# Patient Record
Sex: FEMALE | Race: WHITE | Employment: PART TIME | URBAN - METROPOLITAN AREA
[De-identification: names, ages, dates, MRNs, and addresses within clinical notes are randomized per-mention and may not be internally consistent; named-entity substitution may affect disease eponyms.]

---

## 2017-01-25 ENCOUNTER — TELEPHONE (OUTPATIENT)
Dept: INTERNAL MEDICINE CLINIC | Age: 57
End: 2017-01-25

## 2017-01-27 ENCOUNTER — OFFICE VISIT (OUTPATIENT)
Dept: INTERNAL MEDICINE CLINIC | Age: 57
End: 2017-01-27

## 2017-01-27 VITALS
WEIGHT: 163 LBS | SYSTOLIC BLOOD PRESSURE: 116 MMHG | DIASTOLIC BLOOD PRESSURE: 84 MMHG | RESPIRATION RATE: 16 BRPM | HEIGHT: 63 IN | HEART RATE: 93 BPM | BODY MASS INDEX: 28.88 KG/M2 | OXYGEN SATURATION: 98 %

## 2017-01-27 DIAGNOSIS — K52.9 CHRONIC DIARRHEA: Primary | ICD-10-CM

## 2017-01-27 PROCEDURE — 99212 OFFICE O/P EST SF 10 MIN: CPT | Performed by: INTERNAL MEDICINE

## 2017-04-10 ENCOUNTER — OFFICE VISIT (OUTPATIENT)
Dept: INTERNAL MEDICINE CLINIC | Age: 57
End: 2017-04-10

## 2017-04-10 VITALS — TEMPERATURE: 98.1 F | HEIGHT: 63 IN | RESPIRATION RATE: 16 BRPM | WEIGHT: 163 LBS | BODY MASS INDEX: 28.88 KG/M2

## 2017-04-10 DIAGNOSIS — J01.00 ACUTE MAXILLARY SINUSITIS, RECURRENCE NOT SPECIFIED: Primary | ICD-10-CM

## 2017-04-10 PROCEDURE — 99213 OFFICE O/P EST LOW 20 MIN: CPT | Performed by: INTERNAL MEDICINE

## 2017-04-10 RX ORDER — AMOXICILLIN AND CLAVULANATE POTASSIUM 875; 125 MG/1; MG/1
1 TABLET, FILM COATED ORAL 2 TIMES DAILY
Qty: 20 TABLET | Refills: 0 | Status: SHIPPED | OUTPATIENT
Start: 2017-04-10 | End: 2017-04-20

## 2017-04-10 RX ORDER — GUAIFENESIN AND PSEUDOEPHEDRINE HCL 1200; 120 MG/1; MG/1
TABLET, EXTENDED RELEASE ORAL
Qty: 20 TABLET | Refills: 0 | Status: SHIPPED | OUTPATIENT
Start: 2017-04-10 | End: 2017-12-13 | Stop reason: ALTCHOICE

## 2017-04-21 ENCOUNTER — OFFICE VISIT (OUTPATIENT)
Dept: INTERNAL MEDICINE CLINIC | Age: 57
End: 2017-04-21

## 2017-04-21 VITALS
SYSTOLIC BLOOD PRESSURE: 118 MMHG | HEIGHT: 63 IN | RESPIRATION RATE: 16 BRPM | DIASTOLIC BLOOD PRESSURE: 80 MMHG | WEIGHT: 163 LBS | BODY MASS INDEX: 28.88 KG/M2

## 2017-04-21 DIAGNOSIS — E11.9 TYPE 2 DIABETES MELLITUS WITHOUT COMPLICATION, WITHOUT LONG-TERM CURRENT USE OF INSULIN (HCC): Primary | Chronic | ICD-10-CM

## 2017-04-21 DIAGNOSIS — I10 ESSENTIAL HYPERTENSION: Chronic | ICD-10-CM

## 2017-04-21 DIAGNOSIS — I48.91 ATRIAL FIBRILLATION, CONTROLLED (HCC): Chronic | ICD-10-CM

## 2017-04-21 DIAGNOSIS — E78.2 MIXED HYPERLIPIDEMIA: Chronic | ICD-10-CM

## 2017-04-21 LAB
A/G RATIO: 2.1 (ref 1.1–2.2)
ALBUMIN SERPL-MCNC: 4.6 G/DL (ref 3.4–5)
ALP BLD-CCNC: 64 U/L (ref 40–129)
ALT SERPL-CCNC: 36 U/L (ref 10–40)
ANION GAP SERPL CALCULATED.3IONS-SCNC: 17 MMOL/L (ref 3–16)
AST SERPL-CCNC: 14 U/L (ref 15–37)
BASOPHILS ABSOLUTE: 0.1 K/UL (ref 0–0.2)
BASOPHILS RELATIVE PERCENT: 0.8 %
BILIRUB SERPL-MCNC: 0.8 MG/DL (ref 0–1)
BUN BLDV-MCNC: 17 MG/DL (ref 7–20)
CALCIUM SERPL-MCNC: 9.9 MG/DL (ref 8.3–10.6)
CHLORIDE BLD-SCNC: 100 MMOL/L (ref 99–110)
CHOLESTEROL, TOTAL: 208 MG/DL (ref 0–199)
CO2: 27 MMOL/L (ref 21–32)
CREAT SERPL-MCNC: 0.6 MG/DL (ref 0.6–1.1)
CREATININE URINE: 217.6 MG/DL (ref 28–259)
EOSINOPHILS ABSOLUTE: 0.1 K/UL (ref 0–0.6)
EOSINOPHILS RELATIVE PERCENT: 1.1 %
GFR AFRICAN AMERICAN: >60
GFR NON-AFRICAN AMERICAN: >60
GLOBULIN: 2.2 G/DL
GLUCOSE BLD-MCNC: 123 MG/DL (ref 70–99)
HBA1C MFR BLD: 6.8 %
HCT VFR BLD CALC: 40.2 % (ref 36–48)
HDLC SERPL-MCNC: 70 MG/DL (ref 40–60)
HEMOGLOBIN: 13.2 G/DL (ref 12–16)
LDL CHOLESTEROL CALCULATED: 111 MG/DL
LYMPHOCYTES ABSOLUTE: 3 K/UL (ref 1–5.1)
LYMPHOCYTES RELATIVE PERCENT: 34.8 %
MCH RBC QN AUTO: 33.3 PG (ref 26–34)
MCHC RBC AUTO-ENTMCNC: 32.9 G/DL (ref 31–36)
MCV RBC AUTO: 101.2 FL (ref 80–100)
MICROALBUMIN UR-MCNC: <1.2 MG/DL
MICROALBUMIN/CREAT UR-RTO: NORMAL MG/G (ref 0–30)
MONOCYTES ABSOLUTE: 0.6 K/UL (ref 0–1.3)
MONOCYTES RELATIVE PERCENT: 7.4 %
NEUTROPHILS ABSOLUTE: 4.8 K/UL (ref 1.7–7.7)
NEUTROPHILS RELATIVE PERCENT: 55.9 %
PDW BLD-RTO: 13.5 % (ref 12.4–15.4)
PLATELET # BLD: 260 K/UL (ref 135–450)
PMV BLD AUTO: 9.6 FL (ref 5–10.5)
POTASSIUM SERPL-SCNC: 4.8 MMOL/L (ref 3.5–5.1)
RBC # BLD: 3.98 M/UL (ref 4–5.2)
SODIUM BLD-SCNC: 144 MMOL/L (ref 136–145)
TOTAL PROTEIN: 6.8 G/DL (ref 6.4–8.2)
TRIGL SERPL-MCNC: 136 MG/DL (ref 0–150)
TSH SERPL DL<=0.05 MIU/L-ACNC: 0.6 UIU/ML (ref 0.27–4.2)
VLDLC SERPL CALC-MCNC: 27 MG/DL
WBC # BLD: 8.6 K/UL (ref 4–11)

## 2017-04-21 PROCEDURE — 36415 COLL VENOUS BLD VENIPUNCTURE: CPT | Performed by: INTERNAL MEDICINE

## 2017-04-21 PROCEDURE — 83036 HEMOGLOBIN GLYCOSYLATED A1C: CPT | Performed by: INTERNAL MEDICINE

## 2017-04-21 PROCEDURE — 99214 OFFICE O/P EST MOD 30 MIN: CPT | Performed by: INTERNAL MEDICINE

## 2017-04-21 RX ORDER — ATORVASTATIN CALCIUM 40 MG/1
TABLET, FILM COATED ORAL
Qty: 30 TABLET | Refills: 5 | Status: SHIPPED | OUTPATIENT
Start: 2017-04-21 | End: 2017-10-13 | Stop reason: SDUPTHER

## 2017-04-21 RX ORDER — METOPROLOL TARTRATE 50 MG/1
50 TABLET, FILM COATED ORAL 3 TIMES DAILY
Qty: 90 TABLET | Refills: 5 | Status: SHIPPED | OUTPATIENT
Start: 2017-04-21 | End: 2017-10-13 | Stop reason: SDUPTHER

## 2017-04-21 RX ORDER — GLYBURIDE-METFORMIN HYDROCHLORIDE 5; 500 MG/1; MG/1
TABLET ORAL
Qty: 60 TABLET | Refills: 5 | Status: SHIPPED | OUTPATIENT
Start: 2017-04-21 | End: 2017-10-13 | Stop reason: SDUPTHER

## 2017-04-21 RX ORDER — VALSARTAN AND HYDROCHLOROTHIAZIDE 160; 12.5 MG/1; MG/1
TABLET, FILM COATED ORAL
Qty: 30 TABLET | Refills: 5 | Status: SHIPPED | OUTPATIENT
Start: 2017-04-21 | End: 2017-10-13 | Stop reason: SDUPTHER

## 2017-07-17 ENCOUNTER — TELEPHONE (OUTPATIENT)
Dept: INTERNAL MEDICINE CLINIC | Age: 57
End: 2017-07-17

## 2017-10-13 ENCOUNTER — OFFICE VISIT (OUTPATIENT)
Dept: INTERNAL MEDICINE CLINIC | Age: 57
End: 2017-10-13

## 2017-10-13 VITALS
SYSTOLIC BLOOD PRESSURE: 122 MMHG | WEIGHT: 159 LBS | BODY MASS INDEX: 28.17 KG/M2 | RESPIRATION RATE: 16 BRPM | HEIGHT: 63 IN | DIASTOLIC BLOOD PRESSURE: 84 MMHG

## 2017-10-13 DIAGNOSIS — I48.91 ATRIAL FIBRILLATION, CONTROLLED (HCC): Chronic | ICD-10-CM

## 2017-10-13 DIAGNOSIS — E78.2 MIXED HYPERLIPIDEMIA: Chronic | ICD-10-CM

## 2017-10-13 DIAGNOSIS — E11.9 TYPE 2 DIABETES MELLITUS WITHOUT COMPLICATION, WITHOUT LONG-TERM CURRENT USE OF INSULIN (HCC): Chronic | ICD-10-CM

## 2017-10-13 DIAGNOSIS — Z00.00 EXAMINATION, MEDICAL, GENERAL: Primary | ICD-10-CM

## 2017-10-13 DIAGNOSIS — I10 ESSENTIAL HYPERTENSION: Chronic | ICD-10-CM

## 2017-10-13 LAB
A/G RATIO: 1.7 (ref 1.1–2.2)
ALBUMIN SERPL-MCNC: 4.2 G/DL (ref 3.4–5)
ALP BLD-CCNC: 60 U/L (ref 40–129)
ALT SERPL-CCNC: 101 U/L (ref 10–40)
ANION GAP SERPL CALCULATED.3IONS-SCNC: 13 MMOL/L (ref 3–16)
AST SERPL-CCNC: 42 U/L (ref 15–37)
BASOPHILS ABSOLUTE: 0.1 K/UL (ref 0–0.2)
BASOPHILS RELATIVE PERCENT: 0.7 %
BILIRUB SERPL-MCNC: 0.6 MG/DL (ref 0–1)
BUN BLDV-MCNC: 16 MG/DL (ref 7–20)
CALCIUM SERPL-MCNC: 9.9 MG/DL (ref 8.3–10.6)
CHLORIDE BLD-SCNC: 101 MMOL/L (ref 99–110)
CHOLESTEROL, TOTAL: 199 MG/DL (ref 0–199)
CO2: 26 MMOL/L (ref 21–32)
CREAT SERPL-MCNC: 0.6 MG/DL (ref 0.6–1.1)
EOSINOPHILS ABSOLUTE: 0.1 K/UL (ref 0–0.6)
EOSINOPHILS RELATIVE PERCENT: 1.4 %
GFR AFRICAN AMERICAN: >60
GFR NON-AFRICAN AMERICAN: >60
GLOBULIN: 2.5 G/DL
GLUCOSE BLD-MCNC: 91 MG/DL (ref 70–99)
HCT VFR BLD CALC: 39.2 % (ref 36–48)
HDLC SERPL-MCNC: 72 MG/DL (ref 40–60)
HEMOGLOBIN: 13.3 G/DL (ref 12–16)
LDL CHOLESTEROL CALCULATED: 105 MG/DL
LYMPHOCYTES ABSOLUTE: 3 K/UL (ref 1–5.1)
LYMPHOCYTES RELATIVE PERCENT: 41.3 %
MCH RBC QN AUTO: 34.4 PG (ref 26–34)
MCHC RBC AUTO-ENTMCNC: 33.8 G/DL (ref 31–36)
MCV RBC AUTO: 101.8 FL (ref 80–100)
MONOCYTES ABSOLUTE: 0.8 K/UL (ref 0–1.3)
MONOCYTES RELATIVE PERCENT: 11.5 %
NEUTROPHILS ABSOLUTE: 3.3 K/UL (ref 1.7–7.7)
NEUTROPHILS RELATIVE PERCENT: 45.1 %
PDW BLD-RTO: 12.8 % (ref 12.4–15.4)
PLATELET # BLD: 252 K/UL (ref 135–450)
PMV BLD AUTO: 9.3 FL (ref 5–10.5)
POTASSIUM SERPL-SCNC: 4.5 MMOL/L (ref 3.5–5.1)
RBC # BLD: 3.85 M/UL (ref 4–5.2)
SODIUM BLD-SCNC: 140 MMOL/L (ref 136–145)
TOTAL PROTEIN: 6.7 G/DL (ref 6.4–8.2)
TRIGL SERPL-MCNC: 108 MG/DL (ref 0–150)
TSH SERPL DL<=0.05 MIU/L-ACNC: 0.94 UIU/ML (ref 0.27–4.2)
VLDLC SERPL CALC-MCNC: 22 MG/DL
WBC # BLD: 7.3 K/UL (ref 4–11)

## 2017-10-13 PROCEDURE — 36415 COLL VENOUS BLD VENIPUNCTURE: CPT | Performed by: INTERNAL MEDICINE

## 2017-10-13 PROCEDURE — 99396 PREV VISIT EST AGE 40-64: CPT | Performed by: INTERNAL MEDICINE

## 2017-10-13 RX ORDER — METOPROLOL TARTRATE 50 MG/1
50 TABLET, FILM COATED ORAL 3 TIMES DAILY
Qty: 90 TABLET | Refills: 5 | Status: SHIPPED | OUTPATIENT
Start: 2017-10-13 | End: 2018-05-05 | Stop reason: SDUPTHER

## 2017-10-13 RX ORDER — ATORVASTATIN CALCIUM 40 MG/1
TABLET, FILM COATED ORAL
Qty: 90 TABLET | Refills: 3 | Status: SHIPPED | OUTPATIENT
Start: 2017-10-13 | End: 2018-05-11 | Stop reason: SDUPTHER

## 2017-10-13 RX ORDER — GLYBURIDE-METFORMIN HYDROCHLORIDE 5; 500 MG/1; MG/1
TABLET ORAL
Qty: 60 TABLET | Refills: 5 | Status: SHIPPED | OUTPATIENT
Start: 2017-10-13 | End: 2018-05-04 | Stop reason: SDUPTHER

## 2017-10-13 RX ORDER — VALSARTAN AND HYDROCHLOROTHIAZIDE 160; 12.5 MG/1; MG/1
TABLET, FILM COATED ORAL
Qty: 30 TABLET | Refills: 5 | Status: SHIPPED | OUTPATIENT
Start: 2017-10-13 | End: 2018-04-15 | Stop reason: SDUPTHER

## 2017-10-13 ASSESSMENT — PATIENT HEALTH QUESTIONNAIRE - PHQ9
2. FEELING DOWN, DEPRESSED OR HOPELESS: 0
SUM OF ALL RESPONSES TO PHQ9 QUESTIONS 1 & 2: 0
SUM OF ALL RESPONSES TO PHQ QUESTIONS 1-9: 0

## 2017-10-13 NOTE — ASSESSMENT & PLAN NOTE
This is a chronic problem. The problem is well controlled. Patient monitors readings regularly. Pertinent negatives include no chest pain, focal sensory loss, focal weakness, leg pain, myalgias or shortness of breath. No headaches or chest pain. Takes medications regularly. Blood pressure has been stable, blood work was reviewed, and advised patient to continue the current instructions or medications.

## 2017-10-13 NOTE — PROGRESS NOTES
Chief complaints:  Wilfredo Osei is a 62 y.o. female who presents to the office for a general physical examination. History of present illness:  Here for a physical and fasting blood work,  Type 2 diabetes mellitus without complication ([address]  This problem is stable, reviewed in detail, and advised patient to continue the current instructions or medications. Will continue to closely monitor the situation. Blood sugars have been ok , will monitor closely and cover with sliding scale insulin coverage and continue current medications. BP Readings from Last 2 Encounters:   10/13/17 122/84   04/21/17 118/80     Hemoglobin A1C (%)   Date Value   04/21/2017 6.8     MICROALBUMIN, RANDOM URINE (mg/dL)   Date Value   04/21/2017 <1.20     LDL Calculated (mg/dL)   Date Value   04/21/2017 111 (H)              Tobacco use:  Patient  reports that she has never smoked. She has never used smokeless tobacco.    If Smoker - Cessation materials given? NA    Is Daily aspirin on medication list?:  Yes    Diabetic retinal exam done? Yes   If yes, document in Health Maintenance. Monofilament placed on counter? Yes    Shoes and socks removed? Yes    BP taken with correct size cuff? Yes   Repeated if > 130/80 Yes     Microalbumin performed if applicable? Yes      Essential hypertension  This is a chronic problem. The problem is well controlled. Patient monitors readings regularly. Pertinent negatives include no chest pain, focal sensory loss, focal weakness, leg pain, myalgias or shortness of breath. No headaches or chest pain. Takes medications regularly. Blood pressure has been stable, blood work was reviewed, and advised patient to continue the current instructions or medications. Atrial fibrillation, controlled ([address]  This is rate controlled, and will continue to titrate coumadin with a goal of a stable therapeutic INR of 2-3. Continue current medications for rate control.      Mixed hyperlipidemia  This has been a chronic problem, takes meds regularly. Monitors diet and tries to follow a low fat diet. Not been very compliant w exercise. Lipids have been stable, The problem is controlled. Recent lipid tests were reviewed and are normal. Pertinent negatives include no chest pain, focal sensory loss, focal weakness, leg pain, myalgias or shortness of breath. Advised patient to continue the current instructions or medications. Past Medical History:   Diagnosis Date    Atrial fibrillation, controlled ([facility] 3/27/2013    Essential hypertension 11/1/2011    Hyperlipidemia     Hypertension     Mixed hyperlipidemia 11/1/2011    Psoriasis     Type 2 diabetes mellitus without complication ([facility] 43/8/4565    Type II or unspecified type diabetes mellitus without mention of complication, not stated as uncontrolled      Current Outpatient Prescriptions   Medication Sig Dispense Refill    valsartan-hydrochlorothiazide (DIOVAN-HCT) 160-12.5 MG per tablet TAKE ONE TABLET BY MOUTH DAILY 30 tablet 5    glyBURIDE-metformin (GLUCOVANCE) 5-500 MG per tablet TAKE ONE TABLET BY MOUTH TWICE A DAY WITH MEALS 60 tablet 5    metoprolol tartrate (LOPRESSOR) 50 MG tablet Take 1 tablet by mouth three times daily 90 tablet 5    rivaroxaban (XARELTO) 20 MG TABS tablet Take 1 tablet by mouth daily 30 tablet 5    atorvastatin (LIPITOR) 40 MG tablet TAKE 1 TABLET BY MOUTH ONE TIME A DAY 90 tablet 3    Pseudoephedrine-guaiFENesin (MUCINEX D) 120-1200 MG TB12 1 tab by mouth twice daily. Consume a lot of fluids. 20 tablet 0    Pseudoephedrine-guaiFENesin (MUCINEX D) 120-1200 MG TB12 1 tab by mouth twice daily. Consume a lot of fluids. 20 tablet 0    aspirin 81 MG tablet Take 81 mg by mouth 2 times daily        No current facility-administered medications for this visit.       Allergies : Codeine  Past Surgical History:   Procedure Laterality Date    ROTATOR CUFF REPAIR Right December 2014    Dr. Mitali Castellon at [facility] surgical center     [facility] History   Problem Relation Age of Onset    Other Mother     Heart Disease Mother     Heart Disease Father     Cancer Sister      Social History   Substance Use Topics    Smoking status: Never Smoker    Smokeless tobacco: Never Used    Alcohol use Yes      Comment: rare       Review of systems :  Skin: no abnormal pigmentation, rash, scaling, itching, masses, hair or nail changes  Eyes: negative  Ears/Nose/Throat: negative  Respiratory: negative  Cardiovascular: negative  Gastrointestinal: negative  Genitourinary: negative  Musculoskeletal: negative  Neurologic: negative  Psychiatric: negative  Hematologic/Lymphatic/Immunologic: negative  Endocrine: negative       Objective :     /84 (Site: Left Arm, Position: Sitting, Cuff Size: Medium Adult)   Resp 16   Ht 5' 3\" (1.6 m)   Wt 159 lb (72.1 kg)   BMI 28.17 kg/m²   General appearance - healthy, alert, no distress  Skin - Skin color, texture, turgor normal. No rashes or lesions. Head - Normocephalic. No masses, lesions, tenderness or abnormalities  Eyes - conjunctivae/corneas clear. PERRL, EOM's intact. Ears - External ears normal. Canals clear. TM's normal.  Nose/Sinuses - Nares normal. Septum midline. Mucosa normal. No drainage or sinus tenderness. Oropharynx - Lips, mucosa, and tongue normal. Teeth and gums normal. Oropharynx pink and patent  Neck - Neck supple. No adenopathy. Thyroid symmetric, normal size,  Back - Back symmetric, no curvature. ROM normal. No CVA tenderness. Lungs - Percussion normal. Good diaphragmatic excursion. Lungs clear  Heart - Regular rate and rhythm, with no rub, murmur or gallop noted. Abdomen - Abdomen soft, non-tender. BS normal. No masses, organomegaly  Extremities - Extremities normal. No deformities, edema, or skin discolora  Musculoskeletal - Spine ROM normal. Muscular strength intact.   Peripheral pulses - radial=2+,, femoral=2+, popliteal=2+, dorsalis pedis=2+,  Neuro - Gait normal. Reflexes normal and symmetric. Sensation grossly normal.  No focal weakness       Assessment :     Annual physical exam  Type 2 diabetes mellitus without complication ([address]  This problem is stable, reviewed in detail, and advised patient to continue the current instructions or medications. Will continue to closely monitor the situation. Blood sugars have been ok , will monitor closely and cover with sliding scale insulin coverage and continue current medications. BP Readings from Last 2 Encounters:   10/13/17 122/84   04/21/17 118/80     Hemoglobin A1C (%)   Date Value   04/21/2017 6.8     MICROALBUMIN, RANDOM URINE (mg/dL)   Date Value   04/21/2017 <1.20     LDL Calculated (mg/dL)   Date Value   04/21/2017 111 (H)              Tobacco use:  Patient  reports that she has never smoked. She has never used smokeless tobacco.    If Smoker - Cessation materials given? NA    Is Daily aspirin on medication list?:  Yes    Diabetic retinal exam done? Yes   If yes, document in Health Maintenance. Monofilament placed on counter? Yes    Shoes and socks removed? Yes    BP taken with correct size cuff? Yes   Repeated if > 130/80 Yes     Microalbumin performed if applicable? Yes      Essential hypertension  This is a chronic problem. The problem is well controlled. Patient monitors readings regularly. Pertinent negatives include no chest pain, focal sensory loss, focal weakness, leg pain, myalgias or shortness of breath. No headaches or chest pain. Takes medications regularly. Blood pressure has been stable, blood work was reviewed, and advised patient to continue the current instructions or medications. Atrial fibrillation, controlled ([address]  This is rate controlled, and will continue to titrate coumadin with a goal of a stable therapeutic INR of 2-3. Continue current medications for rate control. Mixed hyperlipidemia  This has been a chronic problem, takes meds regularly. Monitors diet and tries to follow a low fat diet.  Not been very compliant w exercise. Lipids have been stable, The problem is controlled. Recent lipid tests were reviewed and are normal. Pertinent negatives include no chest pain, focal sensory loss, focal weakness, leg pain, myalgias or shortness of breath. Advised patient to continue the current instructions or medications. Plan : This problem is stable, reviewed in detail, and advised patient to continue the current instructions or medications. Will continue to closely monitor the situation.      James Leahy MD  10/13/2017 9:36 AM

## 2017-10-16 DIAGNOSIS — E11.9 TYPE 2 DIABETES MELLITUS WITHOUT COMPLICATION, WITHOUT LONG-TERM CURRENT USE OF INSULIN (HCC): Primary | Chronic | ICD-10-CM

## 2017-10-16 DIAGNOSIS — E11.9 TYPE 2 DIABETES MELLITUS WITHOUT COMPLICATION, WITHOUT LONG-TERM CURRENT USE OF INSULIN (HCC): Chronic | ICD-10-CM

## 2017-10-16 LAB
ESTIMATED AVERAGE GLUCOSE: 125.5 MG/DL
HBA1C MFR BLD: 6 %

## 2017-10-16 PROCEDURE — 83036 HEMOGLOBIN GLYCOSYLATED A1C: CPT | Performed by: INTERNAL MEDICINE

## 2017-10-16 PROCEDURE — 36415 COLL VENOUS BLD VENIPUNCTURE: CPT | Performed by: INTERNAL MEDICINE

## 2017-12-13 ENCOUNTER — OFFICE VISIT (OUTPATIENT)
Dept: CARDIOLOGY CLINIC | Age: 57
End: 2017-12-13

## 2017-12-13 VITALS
DIASTOLIC BLOOD PRESSURE: 74 MMHG | HEART RATE: 94 BPM | BODY MASS INDEX: 28.7 KG/M2 | SYSTOLIC BLOOD PRESSURE: 118 MMHG | WEIGHT: 162 LBS | HEIGHT: 63 IN | OXYGEN SATURATION: 98 %

## 2017-12-13 DIAGNOSIS — E78.2 MIXED HYPERLIPIDEMIA: Chronic | ICD-10-CM

## 2017-12-13 DIAGNOSIS — I48.91 ATRIAL FIBRILLATION, CONTROLLED (HCC): Primary | Chronic | ICD-10-CM

## 2017-12-13 DIAGNOSIS — Z01.810 PREOPERATIVE CARDIOVASCULAR EXAMINATION: ICD-10-CM

## 2017-12-13 DIAGNOSIS — I10 ESSENTIAL HYPERTENSION: Chronic | ICD-10-CM

## 2017-12-13 PROCEDURE — 99214 OFFICE O/P EST MOD 30 MIN: CPT | Performed by: INTERNAL MEDICINE

## 2017-12-13 PROCEDURE — 3014F SCREEN MAMMO DOC REV: CPT | Performed by: INTERNAL MEDICINE

## 2017-12-13 PROCEDURE — 3017F COLORECTAL CA SCREEN DOC REV: CPT | Performed by: INTERNAL MEDICINE

## 2017-12-13 PROCEDURE — G8484 FLU IMMUNIZE NO ADMIN: HCPCS | Performed by: INTERNAL MEDICINE

## 2017-12-13 PROCEDURE — 93000 ELECTROCARDIOGRAM COMPLETE: CPT | Performed by: INTERNAL MEDICINE

## 2017-12-13 PROCEDURE — G8419 CALC BMI OUT NRM PARAM NOF/U: HCPCS | Performed by: INTERNAL MEDICINE

## 2017-12-13 PROCEDURE — G8427 DOCREV CUR MEDS BY ELIG CLIN: HCPCS | Performed by: INTERNAL MEDICINE

## 2017-12-13 PROCEDURE — 1036F TOBACCO NON-USER: CPT | Performed by: INTERNAL MEDICINE

## 2017-12-13 NOTE — LETTER
[address] Christine Manc[address]  Phone: [phone]  Fax: [phone]    Fly Rapp MD        December 13, 2017     Brain Coates [facility] [address]    Patient: Dora Grant  MR Number: [id number]  YOB: 1960  Date of Visit: 12/13/2017    Dear Dr. Brain Coates:            [facility]      Cardiology Follow up    Dora Grant  1960 December 13, 2017     CC: \"I need to have surgery\"     HPI:  The patient is 62 y.o. female with a past medical history significant for DM II, HTN, HLD and Afib. Patient presents today as follow up for her atrial fibrillation. She states she is feeling well and denies any symptoms. She is will be ungergoing breast reduction surgery next week. She denies any palpitations. She continues to work in the [employer] at [employer]. She is compliant with her other medications and is tolerating them well. She denies CP, pressure, tightness, edema, SOB, heart racing, palpitations, lightheaded, dizziness, PND or orthopnea.        Past Medical History:   Diagnosis Date    [facility] fibrillation, [facility] ([facility] 3/27/2013    Essential hypertension 11/1/2011    Hyperlipidemia     Hypertension     Mixed hyperlipidemia 11/1/2011    Psoriasis     Type 2 diabetes mellitus without complication ([facility] 49/8/3127    Type II or unspecified type diabetes mellitus without mention of complication, not stated as uncontrolled      Past Surgical History:   Procedure Laterality Date    ROTATOR CUFF REPAIR Right December 2014    Dr. Juana Byrne at [facility] surgical center     Family History   Problem Relation Age of Onset    Other Mother     Heart Disease Mother     Heart Disease Father     Cancer Sister      Social History   Substance Use Topics    Smoking status: Never Smoker    Smokeless tobacco: Never Used    Alcohol use Yes      Comment: rare Neurological: No gross cranial nerve deficit. Coordination normal.   Skin: Skin is warm and dry. There is no rash or diaphoresis. Psychiatric: She has a normal mood and affect. Her speech is normal and behavior is normal.     Lab Review:   FLP:    Lab Results   Component Value Date    TRIG 108 10/13/2017    HDL 72 10/13/2017    HDL 48 11/01/2011    LDLCALC 105 10/13/2017    LDLDIRECT 158 11/11/2014    LABVLDL 22 10/13/2017     BUN/Creatinine:    Lab Results   Component Value Date    BUN 16 10/13/2017    CREATININE 0.6 10/13/2017     EKG Interpretation: 12/13/17, Atrial fibrillation CVR    Image Review:     ECHO:  3/27/13  EF 50-55%    Assessment/Plan:     Chest pain  Patient denies any further episodes of chest pain. Atrial fibrillation, controlled [facility])  Patient is here for routine follow up. She denies having symptoms from her Afib. Her EKG today shows Afib/Aflutter with CVR, She will continue Metoprolol 75 mg BID. Patient is on chronic anticoagulation therapy. Essential hypertension  BP is stable today. BMP from 10/2017 stable. Mixed hyperlipidemia  Last lipid profile from 10/2017 was within acceptable limits. LDL goal <130 since she has no known CAD. Preop cardiac evaluation  Pt  is here for preop cardiac evaluation. He is scheduled for breast reduction surgery at [facility] she currently denies having any cardiac symptoms. I have cleared her for her surgery I have told her to stop Xarelto for 2 days prior to her surgery and restart as soon as it is okay from surgical point to restart her Xarelto. Follow up in 6 months and she already obtained a seasonal flu shot. Dr. Tyler Mead [phone] fax-will fax clearance    She will return for follow up in 6 months. Thank you very much for allowing me to participate in the care of your patient. Please do not hesitate to contact me if you have any questions.     Sincerely,  Gloria Dubon MD      [address] [facility], [address] [place], [place] [address] [address]  Ph: [phone]  Fax: [phone]          If you have questions, please do not hesitate to call me. I look forward to following Abdifatah Wooten along with you.     Sincerely,        Sheryl Obregon MD

## 2017-12-13 NOTE — LETTER
Allergies   Allergen Reactions    Codeine      Current Outpatient Prescriptions   Medication Sig Dispense Refill    valsartan-hydrochlorothiazide (DIOVAN-HCT) 160-12.5 MG per tablet TAKE ONE TABLET BY MOUTH DAILY 30 tablet 5    glyBURIDE-metformin (GLUCOVANCE) 5-500 MG per tablet TAKE ONE TABLET BY MOUTH TWICE A DAY WITH MEALS 60 tablet 5    metoprolol tartrate (LOPRESSOR) 50 MG tablet Take 1 tablet by mouth three times daily 90 tablet 5    rivaroxaban (XARELTO) 20 MG TABS tablet Take 1 tablet by mouth daily 30 tablet 5    atorvastatin (LIPITOR) 40 MG tablet TAKE 1 TABLET BY MOUTH ONE TIME A DAY 90 tablet 3    aspirin 81 MG tablet Take 81 mg by mouth 2 times daily        No current facility-administered medications for this visit. Review of Systems:  Review of systems is as detailed above and all other systems are normal.      Physical Exam:   /74   Pulse 94   Ht 5' 3\" (1.6 m)   Wt 162 lb (73.5 kg) Comment: did not wish to remove shoes  SpO2 98%   BMI 28.70 kg/m²    Wt Readings from Last 3 Encounters:   12/13/17 162 lb (73.5 kg)   10/13/17 159 lb (72.1 kg)   04/21/17 163 lb (73.9 kg)     Constitutional: She is oriented to person, place, and time. She appears well-developed and well-nourished. In no acute distress. Head: Normocephalic and atraumatic. Pupils equal and round. Neck: Neck supple. No JVP or carotid bruit appreciated. No mass and no thyromegaly present. No lymphadenopathy present. Cardiovascular: Irregularly irregular rhythm. Normal rate. Normal heart sounds. Exam reveals no gallop and no friction rub. No murmur heard. Pulmonary/Chest: Effort normal and breath sounds normal. No respiratory distress. She has no wheezes, rhonchi or rales. Abdominal: Soft, non-tender. Bowel sounds are normal. She exhibits no organomegaly, mass or bruit. Extremities: No edema, cyanosis, or clubbing. Pulses are 2+ radial/dorsalis pedis/posterior tibial/carotid bilaterally. Neurological: No gross cranial nerve deficit. Coordination normal.   Skin: Skin is warm and dry. There is no rash or diaphoresis. Psychiatric: She has a normal mood and affect. Her speech is normal and behavior is normal.     Lab Review:   FLP:    Lab Results   Component Value Date    TRIG 108 10/13/2017    HDL 72 10/13/2017    HDL 48 11/01/2011    LDLCALC 105 10/13/2017    LDLDIRECT 158 11/11/2014    LABVLDL 22 10/13/2017     BUN/Creatinine:    Lab Results   Component Value Date    BUN 16 10/13/2017    CREATININE 0.6 10/13/2017     EKG Interpretation: 12/13/17, Atrial fibrillation CVR    Image Review:     ECHO:  3/27/13  EF 50-55%    Assessment/Plan:     Chest pain  Patient denies any further episodes of chest pain. Atrial fibrillation, controlled [facility])  Patient is here for routine follow up. She denies having symptoms from her Afib. Her EKG today shows Afib/Aflutter with CVR, She will continue Metoprolol 75 mg BID. Patient is on chronic anticoagulation therapy. Essential hypertension  BP is stable today. BMP from 10/2017 stable. Mixed hyperlipidemia  Last lipid profile from 10/2017 was within acceptable limits. LDL goal <130 since she has no known CAD. Preop cardiac evaluation  Pt  is here for preop cardiac evaluation. He is scheduled for breast reduction surgery at [facility] she currently denies having any cardiac symptoms. I have cleared her for her surgery I have told her to stop Xarelto for 2 days prior to her surgery and restart as soon as it is okay from surgical point to restart her Xarelto. Follow up in 6 months and she already obtained a seasonal flu shot. Dr. Taty Almanza [phone] fax-will fax clearance    She will return for follow up in 6 months. Thank you very much for allowing me to participate in the care of your patient. Please do not hesitate to contact me if you have any questions.     Sincerely,  Darci Lucas MD      [address]

## 2017-12-13 NOTE — PROGRESS NOTES
LABVLDL 22 10/13/2017     BUN/Creatinine:    Lab Results   Component Value Date    BUN 16 10/13/2017    CREATININE 0.6 10/13/2017     EKG Interpretation: 12/13/17, Atrial fibrillation CVR    Image Review:     ECHO:  3/27/13  EF 50-55%    Assessment/Plan:     Chest pain  Patient denies any further episodes of chest pain. Atrial fibrillation, controlled [facility])  Patient is here for routine follow up. She denies having symptoms from her Afib. Her EKG today shows Afib/Aflutter with CVR, She will continue Metoprolol 75 mg BID. Patient is on chronic anticoagulation therapy. Essential hypertension  BP is stable today. BMP from 10/2017 stable. Mixed hyperlipidemia  Last lipid profile from 10/2017 was within acceptable limits. LDL goal <130 since she has no known CAD. Preop cardiac evaluation  Pt  is here for preop cardiac evaluation. He is scheduled for breast reduction surgery at [facility] she currently denies having any cardiac symptoms. I have cleared her for her surgery I have told her to stop Xarelto for 2 days prior to her surgery and restart as soon as it is okay from surgical point to restart her Xarelto. Follow up in 6 months and she already obtained a seasonal flu shot. Dr. Jemma Isbell [phone] fax-will fax clearance    She will return for follow up in 6 months. Thank you very much for allowing me to participate in the care of your patient. Please do not hesitate to contact me if you have any questions.     Sincerely,  Fly Rapp MD      [facility] [address] [place] [address], [address] [place] [address]   [place] [facility]  Ph: [phone]  Fax: [phone]

## 2017-12-13 NOTE — PATIENT INSTRUCTIONS
Patient Education        Atrial Fibrillation: Care Instructions  Your Care Instructions    Atrial fibrillation is an irregular and often fast heartbeat. Treating this condition is important for several reasons. It can cause blood clots, which can travel from your heart to your brain and cause a stroke. If you have a fast heartbeat, you may feel lightheaded, dizzy, and weak. An irregular heartbeat can also increase your risk for heart failure. Atrial fibrillation is often the result of another heart condition, such as high blood pressure or coronary artery disease. Making changes to improve your heart condition will help you stay healthy and active. Follow-up care is a key part of your treatment and safety. Be sure to make and go to all appointments, and call your doctor if you are having problems. It's also a good idea to know your test results and keep a list of the medicines you take. How can you care for yourself at home? Medicines  ? · Take your medicines exactly as prescribed. Call your doctor if you think you are having a problem with your medicine. You will get more details on the specific medicines your doctor prescribes. ? · If your doctor has given you a blood thinner to prevent a stroke, be sure you get instructions about how to take your medicine safely. Blood thinners can cause serious bleeding problems. ? · Do not take any vitamins, over-the-counter drugs, or herbal products without talking to your doctor first.   ? Lifestyle changes  ? · Do not smoke. Smoking can increase your chance of a stroke and heart attack. If you need help quitting, talk to your doctor about stop-smoking programs and medicines. These can increase your chances of quitting for good. ? · Eat a heart-healthy diet. ? · Stay at a healthy weight. Lose weight if you need to.   ? · Limit alcohol to 2 drinks a day for men and 1 drink a day for women. Too much alcohol can cause health problems. ? · Avoid colds and flu.  Get a pneumococcal vaccine shot. If you have had one before, ask your doctor whether you need another dose. Get a flu shot every year. If you must be around people with colds or flu, wash your hands often. Activity  ? · If your doctor recommends it, get more exercise. Walking is a good choice. Bit by bit, increase the amount you walk every day. Try for at least 30 minutes on most days of the week. You also may want to swim, bike, or do other activities. Your doctor may suggest that you join a cardiac rehabilitation program so that you can have help increasing your physical activity safely. ? · Start light exercise if your doctor says it is okay. Even a small amount will help you get stronger, have more energy, and manage stress. Walking is an easy way to get exercise. Start out by walking a little more than you did in the hospital. Gradually increase the amount you walk. ? · When you exercise, watch for signs that your heart is working too hard. You are pushing too hard if you cannot talk while you are exercising. If you become short of breath or dizzy or have chest pain, sit down and rest immediately. ? · Check your pulse regularly. Place two fingers on the artery at the palm side of your wrist, in line with your thumb. If your heartbeat seems uneven or fast, talk to your doctor. When should you call for help? Call 911 anytime you think you may need emergency care. For example, call if:  ? · You have symptoms of a heart attack. These may include:  ¨ Chest pain or pressure, or a strange feeling in the chest.  ¨ Sweating. ¨ Shortness of breath. ¨ Nausea or vomiting. ¨ Pain, pressure, or a strange feeling in the back, neck, jaw, or upper belly or in one or both shoulders or arms. ¨ Lightheadedness or sudden weakness. ¨ A fast or irregular heartbeat. After you call 911, the  may tell you to chew 1 adult-strength or 2 to 4 low-dose aspirin. Wait for an ambulance. Do not try to drive yourself.    ?

## 2017-12-14 ENCOUNTER — OFFICE VISIT (OUTPATIENT)
Dept: INTERNAL MEDICINE CLINIC | Age: 57
End: 2017-12-14

## 2017-12-14 ENCOUNTER — TELEPHONE (OUTPATIENT)
Dept: CARDIOLOGY CLINIC | Age: 57
End: 2017-12-14

## 2017-12-14 VITALS
HEIGHT: 63 IN | BODY MASS INDEX: 28.7 KG/M2 | SYSTOLIC BLOOD PRESSURE: 122 MMHG | WEIGHT: 162 LBS | DIASTOLIC BLOOD PRESSURE: 74 MMHG | RESPIRATION RATE: 16 BRPM

## 2017-12-14 DIAGNOSIS — R10.32 LEFT LOWER QUADRANT ABDOMINAL PAIN OF UNKNOWN ETIOLOGY: Primary | ICD-10-CM

## 2017-12-14 PROCEDURE — 1036F TOBACCO NON-USER: CPT | Performed by: INTERNAL MEDICINE

## 2017-12-14 PROCEDURE — 3014F SCREEN MAMMO DOC REV: CPT | Performed by: INTERNAL MEDICINE

## 2017-12-14 PROCEDURE — G8427 DOCREV CUR MEDS BY ELIG CLIN: HCPCS | Performed by: INTERNAL MEDICINE

## 2017-12-14 PROCEDURE — G8419 CALC BMI OUT NRM PARAM NOF/U: HCPCS | Performed by: INTERNAL MEDICINE

## 2017-12-14 PROCEDURE — G8484 FLU IMMUNIZE NO ADMIN: HCPCS | Performed by: INTERNAL MEDICINE

## 2017-12-14 PROCEDURE — 99214 OFFICE O/P EST MOD 30 MIN: CPT | Performed by: INTERNAL MEDICINE

## 2017-12-14 PROCEDURE — 3017F COLORECTAL CA SCREEN DOC REV: CPT | Performed by: INTERNAL MEDICINE

## 2017-12-14 RX ORDER — DICYCLOMINE HCL 20 MG
20 TABLET ORAL 3 TIMES DAILY PRN
Qty: 35 TABLET | Refills: 0 | Status: SHIPPED | OUTPATIENT
Start: 2017-12-14 | End: 2018-05-11

## 2017-12-14 NOTE — TELEPHONE ENCOUNTER
Therese Rey,    Please fax cardiac clearance-office visit note-to Dr. Jemma Isbell. [phone]. Thank you.

## 2017-12-14 NOTE — TELEPHONE ENCOUNTER
Called patient to confirm fax number. [phone]. Copied office note with cardiac clearance. Faxed to above fax number. Confirmation rec'd.

## 2017-12-14 NOTE — PROGRESS NOTES
is atraumatic and normocephalic. Eyes reveal normal conjunctiva, cornea normal, pupils are equal and rective to light. Nasal mucosa is normal. Throat is normal without exudates. Ears reveal normal tympanic membranes. Neck is supple and free of adenopathy, or masses. No thyromegaly. No jugular venous distension. Lungs are clear to auscultation, no rales or rhonchi noted. Heart sounds are regular , no murmurs, clicks, gallops or rubs. Abdomen is soft, no tenderness, masses or organomegaly. Bowel sounds are normally heard. Pelvis: normal. Extremities are normal. Peripheral pulses are normal. Screening neurological exam is normal without focal findings. Cranial nerves are intact, reflexes are symmetrical and muscle strength eaqual. Skin is normal without suspicious lesions noted. Assessment:      Left lower quadrant abdominal pain, may be diverticultis      Plan: Will try bentyl prn. Avoid raw veggies for now. Soft diet.

## 2017-12-20 ENCOUNTER — TELEPHONE (OUTPATIENT)
Dept: CARDIOLOGY CLINIC | Age: 57
End: 2017-12-20

## 2018-01-09 ENCOUNTER — TELEPHONE (OUTPATIENT)
Dept: INTERNAL MEDICINE CLINIC | Age: 58
End: 2018-01-09

## 2018-01-09 NOTE — TELEPHONE ENCOUNTER
Do we have a copy of the paperwork from last year, if so we can fill out forms without seeing her, but let her know there is a fee for forms.

## 2018-01-11 ENCOUNTER — TELEPHONE (OUTPATIENT)
Dept: INTERNAL MEDICINE CLINIC | Age: 58
End: 2018-01-11

## 2018-01-11 NOTE — TELEPHONE ENCOUNTER
LMOM for pt to inform her that [facility] paperwork has not been filled out yet. Since Dr. Erlin Patterson is out of office today and may be possibly tomorrow if anything her paperwork will be filled out on Monday.  Done

## 2018-05-07 RX ORDER — RIVAROXABAN 20 MG/1
TABLET, FILM COATED ORAL
Qty: 30 TABLET | Refills: 0 | Status: SHIPPED | OUTPATIENT
Start: 2018-05-07 | End: 2018-05-11 | Stop reason: SDUPTHER

## 2018-05-07 RX ORDER — METOPROLOL TARTRATE 50 MG/1
TABLET, FILM COATED ORAL
Qty: 90 TABLET | Refills: 0 | Status: SHIPPED | OUTPATIENT
Start: 2018-05-07 | End: 2018-05-11 | Stop reason: SDUPTHER

## 2018-05-11 ENCOUNTER — OFFICE VISIT (OUTPATIENT)
Dept: INTERNAL MEDICINE CLINIC | Age: 58
End: 2018-05-11

## 2018-05-11 VITALS
SYSTOLIC BLOOD PRESSURE: 124 MMHG | OXYGEN SATURATION: 98 % | WEIGHT: 149 LBS | BODY MASS INDEX: 26.39 KG/M2 | HEART RATE: 83 BPM | DIASTOLIC BLOOD PRESSURE: 62 MMHG

## 2018-05-11 DIAGNOSIS — E11.9 TYPE 2 DIABETES MELLITUS WITHOUT COMPLICATION, WITHOUT LONG-TERM CURRENT USE OF INSULIN (HCC): Primary | Chronic | ICD-10-CM

## 2018-05-11 DIAGNOSIS — I10 ESSENTIAL HYPERTENSION: Chronic | ICD-10-CM

## 2018-05-11 DIAGNOSIS — I48.91 ATRIAL FIBRILLATION, CONTROLLED (HCC): Chronic | ICD-10-CM

## 2018-05-11 DIAGNOSIS — E78.2 MIXED HYPERLIPIDEMIA: Chronic | ICD-10-CM

## 2018-05-11 LAB
A/G RATIO: 1.9 (ref 1.1–2.2)
ALBUMIN SERPL-MCNC: 4.8 G/DL (ref 3.4–5)
ALP BLD-CCNC: 97 U/L (ref 40–129)
ALT SERPL-CCNC: 38 U/L (ref 10–40)
ANION GAP SERPL CALCULATED.3IONS-SCNC: 17 MMOL/L (ref 3–16)
AST SERPL-CCNC: 18 U/L (ref 15–37)
BASOPHILS ABSOLUTE: 0.1 K/UL (ref 0–0.2)
BASOPHILS RELATIVE PERCENT: 0.7 %
BILIRUB SERPL-MCNC: 0.9 MG/DL (ref 0–1)
BUN BLDV-MCNC: 20 MG/DL (ref 7–20)
CALCIUM SERPL-MCNC: 10.2 MG/DL (ref 8.3–10.6)
CHLORIDE BLD-SCNC: 100 MMOL/L (ref 99–110)
CHOLESTEROL, TOTAL: 167 MG/DL (ref 0–199)
CO2: 24 MMOL/L (ref 21–32)
CREAT SERPL-MCNC: 0.7 MG/DL (ref 0.6–1.1)
CREATININE URINE: 221.4 MG/DL (ref 28–259)
EOSINOPHILS ABSOLUTE: 0.1 K/UL (ref 0–0.6)
EOSINOPHILS RELATIVE PERCENT: 1 %
GFR AFRICAN AMERICAN: >60
GFR NON-AFRICAN AMERICAN: >60
GLOBULIN: 2.5 G/DL
GLUCOSE BLD-MCNC: 75 MG/DL (ref 70–99)
HBA1C MFR BLD: 5.7 %
HCT VFR BLD CALC: 39.6 % (ref 36–48)
HDLC SERPL-MCNC: 59 MG/DL (ref 40–60)
HEMOGLOBIN: 13.4 G/DL (ref 12–16)
LDL CHOLESTEROL CALCULATED: 74 MG/DL
LYMPHOCYTES ABSOLUTE: 2.7 K/UL (ref 1–5.1)
LYMPHOCYTES RELATIVE PERCENT: 24.5 %
MCH RBC QN AUTO: 34.1 PG (ref 26–34)
MCHC RBC AUTO-ENTMCNC: 33.8 G/DL (ref 31–36)
MCV RBC AUTO: 100.8 FL (ref 80–100)
MICROALBUMIN UR-MCNC: 6.4 MG/DL
MICROALBUMIN/CREAT UR-RTO: 28.9 MG/G (ref 0–30)
MONOCYTES ABSOLUTE: 0.9 K/UL (ref 0–1.3)
MONOCYTES RELATIVE PERCENT: 7.8 %
NEUTROPHILS ABSOLUTE: 7.4 K/UL (ref 1.7–7.7)
NEUTROPHILS RELATIVE PERCENT: 66 %
PDW BLD-RTO: 12.9 % (ref 12.4–15.4)
PLATELET # BLD: 343 K/UL (ref 135–450)
PMV BLD AUTO: 9.8 FL (ref 5–10.5)
POTASSIUM SERPL-SCNC: 4.5 MMOL/L (ref 3.5–5.1)
RBC # BLD: 3.93 M/UL (ref 4–5.2)
SODIUM BLD-SCNC: 141 MMOL/L (ref 136–145)
TOTAL PROTEIN: 7.3 G/DL (ref 6.4–8.2)
TRIGL SERPL-MCNC: 171 MG/DL (ref 0–150)
TSH SERPL DL<=0.05 MIU/L-ACNC: 0.81 UIU/ML (ref 0.27–4.2)
VLDLC SERPL CALC-MCNC: 34 MG/DL
WBC # BLD: 11.2 K/UL (ref 4–11)

## 2018-05-11 PROCEDURE — 3044F HG A1C LEVEL LT 7.0%: CPT | Performed by: INTERNAL MEDICINE

## 2018-05-11 PROCEDURE — 36415 COLL VENOUS BLD VENIPUNCTURE: CPT | Performed by: INTERNAL MEDICINE

## 2018-05-11 PROCEDURE — G8419 CALC BMI OUT NRM PARAM NOF/U: HCPCS | Performed by: INTERNAL MEDICINE

## 2018-05-11 PROCEDURE — 3017F COLORECTAL CA SCREEN DOC REV: CPT | Performed by: INTERNAL MEDICINE

## 2018-05-11 PROCEDURE — 2022F DILAT RTA XM EVC RTNOPTHY: CPT | Performed by: INTERNAL MEDICINE

## 2018-05-11 PROCEDURE — 1036F TOBACCO NON-USER: CPT | Performed by: INTERNAL MEDICINE

## 2018-05-11 PROCEDURE — 83036 HEMOGLOBIN GLYCOSYLATED A1C: CPT | Performed by: INTERNAL MEDICINE

## 2018-05-11 PROCEDURE — G8427 DOCREV CUR MEDS BY ELIG CLIN: HCPCS | Performed by: INTERNAL MEDICINE

## 2018-05-11 PROCEDURE — 99214 OFFICE O/P EST MOD 30 MIN: CPT | Performed by: INTERNAL MEDICINE

## 2018-05-11 RX ORDER — GLYBURIDE-METFORMIN HYDROCHLORIDE 5; 500 MG/1; MG/1
TABLET ORAL
Qty: 60 TABLET | Refills: 5 | Status: SHIPPED | OUTPATIENT
Start: 2018-05-11 | End: 2018-10-22 | Stop reason: SDUPTHER

## 2018-05-11 RX ORDER — VALSARTAN AND HYDROCHLOROTHIAZIDE 160; 12.5 MG/1; MG/1
TABLET, FILM COATED ORAL
Qty: 30 TABLET | Refills: 5 | Status: SHIPPED | OUTPATIENT
Start: 2018-05-11 | End: 2018-10-22 | Stop reason: SDUPTHER

## 2018-05-11 RX ORDER — METOPROLOL TARTRATE 50 MG/1
TABLET, FILM COATED ORAL
Qty: 90 TABLET | Refills: 5 | Status: SHIPPED | OUTPATIENT
Start: 2018-05-11 | End: 2018-10-22 | Stop reason: SDUPTHER

## 2018-05-11 RX ORDER — ATORVASTATIN CALCIUM 40 MG/1
TABLET, FILM COATED ORAL
Qty: 90 TABLET | Refills: 3 | Status: SHIPPED | OUTPATIENT
Start: 2018-05-11 | End: 2018-10-22 | Stop reason: SDUPTHER

## 2018-05-11 RX ORDER — ATORVASTATIN CALCIUM 40 MG/1
TABLET, FILM COATED ORAL
Qty: 90 TABLET | Refills: 3 | Status: CANCELLED | OUTPATIENT
Start: 2018-05-11

## 2018-08-02 RX ORDER — DICYCLOMINE HCL 20 MG
TABLET ORAL
Qty: 35 TABLET | Refills: 0 | Status: SHIPPED | OUTPATIENT
Start: 2018-08-02 | End: 2018-11-08 | Stop reason: SDUPTHER

## 2018-10-22 ENCOUNTER — OFFICE VISIT (OUTPATIENT)
Dept: INTERNAL MEDICINE CLINIC | Age: 58
End: 2018-10-22
Payer: COMMERCIAL

## 2018-10-22 VITALS
DIASTOLIC BLOOD PRESSURE: 74 MMHG | SYSTOLIC BLOOD PRESSURE: 120 MMHG | WEIGHT: 144 LBS | HEART RATE: 71 BPM | BODY MASS INDEX: 25.51 KG/M2 | OXYGEN SATURATION: 99 %

## 2018-10-22 DIAGNOSIS — E78.2 MIXED HYPERLIPIDEMIA: Chronic | ICD-10-CM

## 2018-10-22 DIAGNOSIS — I48.91 ATRIAL FIBRILLATION, CONTROLLED (HCC): Chronic | ICD-10-CM

## 2018-10-22 DIAGNOSIS — I10 ESSENTIAL HYPERTENSION: Chronic | ICD-10-CM

## 2018-10-22 DIAGNOSIS — E11.9 TYPE 2 DIABETES MELLITUS WITHOUT COMPLICATION, WITHOUT LONG-TERM CURRENT USE OF INSULIN (HCC): Chronic | ICD-10-CM

## 2018-10-22 DIAGNOSIS — Z00.00 ROUTINE GENERAL MEDICAL EXAMINATION AT HEALTH CARE FACILITY: Primary | ICD-10-CM

## 2018-10-22 LAB
A/G RATIO: 2 (ref 1.1–2.2)
ALBUMIN SERPL-MCNC: 4.7 G/DL (ref 3.4–5)
ALP BLD-CCNC: 92 U/L (ref 40–129)
ALT SERPL-CCNC: 25 U/L (ref 10–40)
ANION GAP SERPL CALCULATED.3IONS-SCNC: 17 MMOL/L (ref 3–16)
AST SERPL-CCNC: 14 U/L (ref 15–37)
BASOPHILS ABSOLUTE: 0.1 K/UL (ref 0–0.2)
BASOPHILS RELATIVE PERCENT: 0.9 %
BILIRUB SERPL-MCNC: 0.8 MG/DL (ref 0–1)
BUN BLDV-MCNC: 19 MG/DL (ref 7–20)
CALCIUM SERPL-MCNC: 10 MG/DL (ref 8.3–10.6)
CHLORIDE BLD-SCNC: 100 MMOL/L (ref 99–110)
CHOLESTEROL, TOTAL: 161 MG/DL (ref 0–199)
CO2: 24 MMOL/L (ref 21–32)
CREAT SERPL-MCNC: 0.8 MG/DL (ref 0.6–1.1)
CREATININE URINE: 149.8 MG/DL (ref 28–259)
EOSINOPHILS ABSOLUTE: 0.1 K/UL (ref 0–0.6)
EOSINOPHILS RELATIVE PERCENT: 1.2 %
GFR AFRICAN AMERICAN: >60
GFR NON-AFRICAN AMERICAN: >60
GLOBULIN: 2.4 G/DL
GLUCOSE BLD-MCNC: 110 MG/DL (ref 70–99)
HBA1C MFR BLD: 6.9 %
HCT VFR BLD CALC: 37.2 % (ref 36–48)
HDLC SERPL-MCNC: 60 MG/DL (ref 40–60)
HEMOGLOBIN: 12.6 G/DL (ref 12–16)
LDL CHOLESTEROL CALCULATED: 71 MG/DL
LYMPHOCYTES ABSOLUTE: 2.2 K/UL (ref 1–5.1)
LYMPHOCYTES RELATIVE PERCENT: 24 %
MCH RBC QN AUTO: 33.4 PG (ref 26–34)
MCHC RBC AUTO-ENTMCNC: 33.8 G/DL (ref 31–36)
MCV RBC AUTO: 98.7 FL (ref 80–100)
MICROALBUMIN UR-MCNC: 1.4 MG/DL
MICROALBUMIN/CREAT UR-RTO: 9.3 MG/G (ref 0–30)
MONOCYTES ABSOLUTE: 0.6 K/UL (ref 0–1.3)
MONOCYTES RELATIVE PERCENT: 6.7 %
NEUTROPHILS ABSOLUTE: 6.1 K/UL (ref 1.7–7.7)
NEUTROPHILS RELATIVE PERCENT: 67.2 %
PDW BLD-RTO: 12.7 % (ref 12.4–15.4)
PLATELET # BLD: 330 K/UL (ref 135–450)
PMV BLD AUTO: 9 FL (ref 5–10.5)
POTASSIUM SERPL-SCNC: 4.5 MMOL/L (ref 3.5–5.1)
RBC # BLD: 3.76 M/UL (ref 4–5.2)
SODIUM BLD-SCNC: 141 MMOL/L (ref 136–145)
TOTAL PROTEIN: 7.1 G/DL (ref 6.4–8.2)
TRIGL SERPL-MCNC: 148 MG/DL (ref 0–150)
TSH SERPL DL<=0.05 MIU/L-ACNC: 0.7 UIU/ML (ref 0.27–4.2)
VLDLC SERPL CALC-MCNC: 30 MG/DL
WBC # BLD: 9.1 K/UL (ref 4–11)

## 2018-10-22 PROCEDURE — 36415 COLL VENOUS BLD VENIPUNCTURE: CPT | Performed by: INTERNAL MEDICINE

## 2018-10-22 PROCEDURE — G8484 FLU IMMUNIZE NO ADMIN: HCPCS | Performed by: INTERNAL MEDICINE

## 2018-10-22 PROCEDURE — 99396 PREV VISIT EST AGE 40-64: CPT | Performed by: INTERNAL MEDICINE

## 2018-10-22 PROCEDURE — 83036 HEMOGLOBIN GLYCOSYLATED A1C: CPT | Performed by: INTERNAL MEDICINE

## 2018-10-22 RX ORDER — METOPROLOL TARTRATE 50 MG/1
TABLET, FILM COATED ORAL
Qty: 90 TABLET | Refills: 5 | Status: SHIPPED | OUTPATIENT
Start: 2018-10-22 | End: 2019-09-13 | Stop reason: SDUPTHER

## 2018-10-22 RX ORDER — VALSARTAN AND HYDROCHLOROTHIAZIDE 160; 12.5 MG/1; MG/1
TABLET, FILM COATED ORAL
Qty: 30 TABLET | Refills: 5 | Status: SHIPPED | OUTPATIENT
Start: 2018-10-22 | End: 2019-05-16 | Stop reason: SDUPTHER

## 2018-10-22 RX ORDER — ATORVASTATIN CALCIUM 40 MG/1
TABLET, FILM COATED ORAL
Qty: 90 TABLET | Refills: 3 | Status: SHIPPED | OUTPATIENT
Start: 2018-10-22 | End: 2019-05-16

## 2018-10-22 RX ORDER — GLYBURIDE-METFORMIN HYDROCHLORIDE 5; 500 MG/1; MG/1
TABLET ORAL
Qty: 60 TABLET | Refills: 5 | Status: SHIPPED | OUTPATIENT
Start: 2018-10-22 | End: 2018-11-28 | Stop reason: SDUPTHER

## 2018-10-22 ASSESSMENT — PATIENT HEALTH QUESTIONNAIRE - PHQ9
SUM OF ALL RESPONSES TO PHQ QUESTIONS 1-9: 0
SUM OF ALL RESPONSES TO PHQ9 QUESTIONS 1 & 2: 0
2. FEELING DOWN, DEPRESSED OR HOPELESS: 0
1. LITTLE INTEREST OR PLEASURE IN DOING THINGS: 0
SUM OF ALL RESPONSES TO PHQ QUESTIONS 1-9: 0

## 2018-11-05 ENCOUNTER — TELEPHONE (OUTPATIENT)
Dept: INTERNAL MEDICINE CLINIC | Age: 58
End: 2018-11-05

## 2018-11-08 RX ORDER — DICYCLOMINE HCL 20 MG
TABLET ORAL
Qty: 35 TABLET | Refills: 5 | Status: SHIPPED | OUTPATIENT
Start: 2018-11-08 | End: 2019-10-02 | Stop reason: SDUPTHER

## 2018-11-28 ENCOUNTER — OFFICE VISIT (OUTPATIENT)
Dept: INTERNAL MEDICINE CLINIC | Age: 58
End: 2018-11-28
Payer: COMMERCIAL

## 2018-11-28 VITALS
TEMPERATURE: 98.6 F | DIASTOLIC BLOOD PRESSURE: 70 MMHG | RESPIRATION RATE: 14 BRPM | HEART RATE: 87 BPM | BODY MASS INDEX: 25.34 KG/M2 | HEIGHT: 63 IN | OXYGEN SATURATION: 98 % | SYSTOLIC BLOOD PRESSURE: 108 MMHG | WEIGHT: 143 LBS

## 2018-11-28 DIAGNOSIS — J01.11 ACUTE RECURRENT FRONTAL SINUSITIS: Primary | ICD-10-CM

## 2018-11-28 PROCEDURE — 1036F TOBACCO NON-USER: CPT | Performed by: NURSE PRACTITIONER

## 2018-11-28 PROCEDURE — G8419 CALC BMI OUT NRM PARAM NOF/U: HCPCS | Performed by: NURSE PRACTITIONER

## 2018-11-28 PROCEDURE — 99213 OFFICE O/P EST LOW 20 MIN: CPT | Performed by: NURSE PRACTITIONER

## 2018-11-28 PROCEDURE — G8484 FLU IMMUNIZE NO ADMIN: HCPCS | Performed by: NURSE PRACTITIONER

## 2018-11-28 PROCEDURE — 3017F COLORECTAL CA SCREEN DOC REV: CPT | Performed by: NURSE PRACTITIONER

## 2018-11-28 PROCEDURE — G8427 DOCREV CUR MEDS BY ELIG CLIN: HCPCS | Performed by: NURSE PRACTITIONER

## 2018-11-28 RX ORDER — AMOXICILLIN AND CLAVULANATE POTASSIUM 875; 125 MG/1; MG/1
1 TABLET, FILM COATED ORAL 2 TIMES DAILY
Qty: 20 TABLET | Refills: 0 | Status: SHIPPED | OUTPATIENT
Start: 2018-11-28 | End: 2018-12-08

## 2018-11-28 ASSESSMENT — ENCOUNTER SYMPTOMS
HOARSE VOICE: 0
SWOLLEN GLANDS: 0
RHINORRHEA: 1
SINUS COMPLAINT: 1
ABDOMINAL PAIN: 0
ABDOMINAL DISTENTION: 0
STRIDOR: 0
COUGH: 1
WHEEZING: 0
SINUS PRESSURE: 1
SHORTNESS OF BREATH: 0
SINUS PAIN: 1
ANAL BLEEDING: 0
BACK PAIN: 0
BLOOD IN STOOL: 0
SORE THROAT: 0
CONSTIPATION: 0

## 2019-02-22 ENCOUNTER — OFFICE VISIT (OUTPATIENT)
Dept: ENT CLINIC | Age: 59
End: 2019-02-22
Payer: COMMERCIAL

## 2019-02-22 VITALS
HEIGHT: 64 IN | SYSTOLIC BLOOD PRESSURE: 121 MMHG | BODY MASS INDEX: 24.79 KG/M2 | HEART RATE: 92 BPM | WEIGHT: 145.2 LBS | DIASTOLIC BLOOD PRESSURE: 74 MMHG

## 2019-02-22 DIAGNOSIS — J32.0 CHRONIC MAXILLARY SINUSITIS: Primary | ICD-10-CM

## 2019-02-22 DIAGNOSIS — J30.1 SEASONAL ALLERGIC RHINITIS DUE TO POLLEN: ICD-10-CM

## 2019-02-22 PROCEDURE — 31231 NASAL ENDOSCOPY DX: CPT | Performed by: OTOLARYNGOLOGY

## 2019-02-22 PROCEDURE — G8484 FLU IMMUNIZE NO ADMIN: HCPCS | Performed by: OTOLARYNGOLOGY

## 2019-02-22 PROCEDURE — 99214 OFFICE O/P EST MOD 30 MIN: CPT | Performed by: OTOLARYNGOLOGY

## 2019-02-22 PROCEDURE — G8427 DOCREV CUR MEDS BY ELIG CLIN: HCPCS | Performed by: OTOLARYNGOLOGY

## 2019-02-22 PROCEDURE — G8419 CALC BMI OUT NRM PARAM NOF/U: HCPCS | Performed by: OTOLARYNGOLOGY

## 2019-02-22 PROCEDURE — 3017F COLORECTAL CA SCREEN DOC REV: CPT | Performed by: OTOLARYNGOLOGY

## 2019-02-22 ASSESSMENT — ENCOUNTER SYMPTOMS
EYE DISCHARGE: 0
SORE THROAT: 0
SINUS PAIN: 0
WHEEZING: 0
DIARRHEA: 0
BLOOD IN STOOL: 0
COLOR CHANGE: 0
PHOTOPHOBIA: 0
SINUS PRESSURE: 1
EYE PAIN: 0
SHORTNESS OF BREATH: 0
EYE ITCHING: 0
TROUBLE SWALLOWING: 0
STRIDOR: 0
CHOKING: 0
RHINORRHEA: 1
CONSTIPATION: 0
VOMITING: 0
COUGH: 0
EYE REDNESS: 0
VOICE CHANGE: 0
FACIAL SWELLING: 0
BACK PAIN: 0
NAUSEA: 0

## 2019-03-05 ENCOUNTER — TELEPHONE (OUTPATIENT)
Dept: ENT CLINIC | Age: 59
End: 2019-03-05

## 2019-03-07 ENCOUNTER — TELEPHONE (OUTPATIENT)
Dept: ENT CLINIC | Age: 59
End: 2019-03-07

## 2019-03-18 RX ORDER — AMOXICILLIN 500 MG/1
500 CAPSULE ORAL 2 TIMES DAILY
Qty: 20 CAPSULE | Refills: 0 | Status: SHIPPED | OUTPATIENT
Start: 2019-03-18 | End: 2019-03-28

## 2019-05-16 RX ORDER — ATORVASTATIN CALCIUM 40 MG/1
TABLET, FILM COATED ORAL
Qty: 90 TABLET | Refills: 2 | Status: SHIPPED | OUTPATIENT
Start: 2019-05-16 | End: 2019-10-02 | Stop reason: SDUPTHER

## 2019-09-25 ENCOUNTER — OFFICE VISIT (OUTPATIENT)
Dept: INTERNAL MEDICINE CLINIC | Age: 59
End: 2019-09-25
Payer: COMMERCIAL

## 2019-09-25 VITALS
RESPIRATION RATE: 18 BRPM | DIASTOLIC BLOOD PRESSURE: 62 MMHG | SYSTOLIC BLOOD PRESSURE: 134 MMHG | HEIGHT: 63 IN | HEART RATE: 84 BPM | WEIGHT: 145.4 LBS | BODY MASS INDEX: 25.76 KG/M2 | TEMPERATURE: 97.8 F | OXYGEN SATURATION: 99 %

## 2019-09-25 DIAGNOSIS — E11.9 TYPE 2 DIABETES MELLITUS WITHOUT COMPLICATION, WITHOUT LONG-TERM CURRENT USE OF INSULIN (HCC): Primary | ICD-10-CM

## 2019-09-25 PROBLEM — N62 MACROMASTIA: Status: ACTIVE | Noted: 2017-12-19

## 2019-09-25 PROBLEM — Z98.890 STATUS POST MOHS SURGERY FOR BASAL CELL CARCINOMA: Status: ACTIVE | Noted: 2018-08-23

## 2019-09-25 PROBLEM — Z85.828 STATUS POST MOHS SURGERY FOR BASAL CELL CARCINOMA: Status: ACTIVE | Noted: 2018-08-23

## 2019-09-25 LAB
CREATININE URINE: 147 MG/DL (ref 28–259)
HBA1C MFR BLD: 5.7 %
MICROALBUMIN UR-MCNC: <1.2 MG/DL
MICROALBUMIN/CREAT UR-RTO: NORMAL MG/G (ref 0–30)

## 2019-09-25 PROCEDURE — G8419 CALC BMI OUT NRM PARAM NOF/U: HCPCS | Performed by: NURSE PRACTITIONER

## 2019-09-25 PROCEDURE — G8427 DOCREV CUR MEDS BY ELIG CLIN: HCPCS | Performed by: NURSE PRACTITIONER

## 2019-09-25 PROCEDURE — 99214 OFFICE O/P EST MOD 30 MIN: CPT | Performed by: NURSE PRACTITIONER

## 2019-09-25 PROCEDURE — 2022F DILAT RTA XM EVC RTNOPTHY: CPT | Performed by: NURSE PRACTITIONER

## 2019-09-25 PROCEDURE — 3044F HG A1C LEVEL LT 7.0%: CPT | Performed by: NURSE PRACTITIONER

## 2019-09-25 PROCEDURE — 83036 HEMOGLOBIN GLYCOSYLATED A1C: CPT | Performed by: NURSE PRACTITIONER

## 2019-09-25 PROCEDURE — 3017F COLORECTAL CA SCREEN DOC REV: CPT | Performed by: NURSE PRACTITIONER

## 2019-09-25 PROCEDURE — 1036F TOBACCO NON-USER: CPT | Performed by: NURSE PRACTITIONER

## 2019-09-25 ASSESSMENT — ENCOUNTER SYMPTOMS
CHOKING: 0
COLOR CHANGE: 0
ABDOMINAL DISTENTION: 0
BLURRED VISION: 0
CHEST TIGHTNESS: 0
VISUAL CHANGE: 0
SHORTNESS OF BREATH: 0
COUGH: 0
STRIDOR: 0

## 2019-09-25 NOTE — PROGRESS NOTES
at [facility]       Allergies   Allergen Reactions    Codeine        Outpatient Medications Marked as Taking for the 9/25/19 encounter (Office Visit) with JAZMYN Martin CNP   Medication Sig Dispense Refill    metoprolol tartrate (LOPRESSOR) 50 MG tablet TAKE ONE TABLET BY MOUTH THREE TIMES A DAY 90 tablet 5    rivaroxaban (XARELTO) 20 MG TABS tablet TAKE ONE TABLET BY MOUTH DAILY 30 tablet 4    valsartan-hydrochlorothiazide (DIOVAN-HCT) 160-12.5 MG per tablet TAKE ONE TABLET BY MOUTH DAILY 30 tablet 4    glyBURIDE-metformin (GLUCOVANCE) 5-500 MG per tablet TAKE ONE TABLET BY MOUTH TWICE A DAY WITH MEALS 60 tablet 4    atorvastatin (LIPITOR) 40 MG tablet TAKE 1 TABLET BY MOUTH ONE TIME A DAY  90 tablet 2    dicyclomine (BENTYL) 20 MG tablet TAKE ONE TABLET BY MOUTH THREE TIMES A DAY AS NEEDED FOR ABDOMINAL PAIN 35 tablet 5    Apremilast 30 MG TABS Take 30 mg by mouth daily          Family History   Problem Relation Age of Onset    Other Mother     Heart Disease Mother     Heart Disease Father     Cancer Sister        Social History     Socioeconomic History    Marital status:      Spouse name: Not on file    Number of children: Not on file    Years of education: Not on file    Highest education level: Not on file   Occupational History    Not on file   Social Needs    Financial resource strain: Not on file    Food insecurity:     Worry: Not on file     Inability: Not on file    Transportation needs:     Medical: Not on file     Non-medical: Not on file   Tobacco Use    Smoking status: Never Smoker    Smokeless tobacco: Never Used   Substance and Sexual Activity    Alcohol use: Yes     Comment: rare    Drug use: No    Sexual activity: Not Currently   Lifestyle    Physical activity:     Days per week: Not on file     Minutes per session: Not on file    Stress: Not on file   Relationships    Social connections:     Talks on phone: Not on file     Gets together: Not on file     Attends Church service: Not on file     Active member of club or organization: Not on file     Attends meetings of clubs or organizations: Not on file     Relationship status: Not on file    Intimate partner violence:     Fear of current or ex partner: Not on file     Emotionally abused: Not on file     Physically abused: Not on file     Forced sexual activity: Not on file   Other Topics Concern    Not on file   Social History Narrative    Not on file       Review of Systems   Constitutional: Negative for activity change, appetite change, fatigue, fever, unexpected weight change and weight loss. Eyes: Negative for blurred vision and visual disturbance. Respiratory: Negative for cough, choking, chest tightness, shortness of breath and stridor. Cardiovascular: Negative for chest pain. Gastrointestinal: Negative for abdominal distention. Endocrine: Negative for polydipsia, polyphagia and polyuria. Genitourinary: Negative for difficulty urinating, dysuria and urgency. Skin: Negative for color change, pallor, rash and wound. Neurological: Negative for dizziness, tremors, seizures, speech difficulty, weakness and headaches. Psychiatric/Behavioral: Negative for confusion, self-injury, sleep disturbance and suicidal ideas. The patient is not nervous/anxious. Physical Exam   Nursing note reviewed  /62 (Site: Right Upper Arm, Position: Sitting, Cuff Size: Medium Adult)   Pulse 84   Temp 97.8 °F (36.6 °C) (Oral)   Resp 18   Ht 5' 3\" (1.6 m)   Wt 145 lb 6.4 oz (66 kg)   SpO2 99%   Breastfeeding? No   BMI 25.76 kg/m²   BP Readings from Last 3 Encounters:   09/25/19 134/62   02/22/19 121/74   11/28/18 108/70     Wt Readings from Last 3 Encounters:   09/25/19 145 lb 6.4 oz (66 kg)   02/22/19 145 lb 3.2 oz (65.9 kg)   11/28/18 143 lb (64.9 kg)     Body mass index is 25.76 kg/m². No results found for this visit on 09/25/19.      Diabetic Foot Exam  Visual

## 2019-10-02 ENCOUNTER — OFFICE VISIT (OUTPATIENT)
Dept: INTERNAL MEDICINE CLINIC | Age: 59
End: 2019-10-02
Payer: COMMERCIAL

## 2019-10-02 ENCOUNTER — TELEPHONE (OUTPATIENT)
Dept: INTERNAL MEDICINE CLINIC | Age: 59
End: 2019-10-02

## 2019-10-02 VITALS
HEIGHT: 63 IN | RESPIRATION RATE: 16 BRPM | BODY MASS INDEX: 25.69 KG/M2 | DIASTOLIC BLOOD PRESSURE: 76 MMHG | TEMPERATURE: 98.3 F | WEIGHT: 145 LBS | OXYGEN SATURATION: 98 % | SYSTOLIC BLOOD PRESSURE: 111 MMHG | HEART RATE: 83 BPM

## 2019-10-02 DIAGNOSIS — I48.91 ATRIAL FIBRILLATION, UNSPECIFIED TYPE (HCC): ICD-10-CM

## 2019-10-02 DIAGNOSIS — I10 ESSENTIAL HYPERTENSION: ICD-10-CM

## 2019-10-02 DIAGNOSIS — K58.0 IRRITABLE BOWEL SYNDROME WITH DIARRHEA: ICD-10-CM

## 2019-10-02 DIAGNOSIS — E78.2 MIXED HYPERLIPIDEMIA: ICD-10-CM

## 2019-10-02 DIAGNOSIS — L40.9 PSORIASIS: ICD-10-CM

## 2019-10-02 DIAGNOSIS — E11.9 TYPE 2 DIABETES MELLITUS WITHOUT COMPLICATION, WITHOUT LONG-TERM CURRENT USE OF INSULIN (HCC): Primary | ICD-10-CM

## 2019-10-02 DIAGNOSIS — E11.9 TYPE 2 DIABETES MELLITUS WITHOUT COMPLICATION, WITHOUT LONG-TERM CURRENT USE OF INSULIN (HCC): ICD-10-CM

## 2019-10-02 LAB
A/G RATIO: 2 (ref 1.1–2.2)
ALBUMIN SERPL-MCNC: 4.7 G/DL (ref 3.4–5)
ALP BLD-CCNC: 98 U/L (ref 40–129)
ALT SERPL-CCNC: 31 U/L (ref 10–40)
ANION GAP SERPL CALCULATED.3IONS-SCNC: 17 MMOL/L (ref 3–16)
AST SERPL-CCNC: 17 U/L (ref 15–37)
BILIRUB SERPL-MCNC: 0.8 MG/DL (ref 0–1)
BUN BLDV-MCNC: 17 MG/DL (ref 7–20)
CALCIUM SERPL-MCNC: 10.3 MG/DL (ref 8.3–10.6)
CHLORIDE BLD-SCNC: 102 MMOL/L (ref 99–110)
CHOLESTEROL, TOTAL: 171 MG/DL (ref 0–199)
CO2: 22 MMOL/L (ref 21–32)
CREAT SERPL-MCNC: 0.8 MG/DL (ref 0.6–1.1)
GFR AFRICAN AMERICAN: >60
GFR NON-AFRICAN AMERICAN: >60
GLOBULIN: 2.3 G/DL
GLUCOSE BLD-MCNC: 139 MG/DL (ref 70–99)
HCT VFR BLD CALC: 37.2 % (ref 36–48)
HDLC SERPL-MCNC: 65 MG/DL (ref 40–60)
HEMOGLOBIN: 12.4 G/DL (ref 12–16)
LDL CHOLESTEROL CALCULATED: 73 MG/DL
MCH RBC QN AUTO: 32.5 PG (ref 26–34)
MCHC RBC AUTO-ENTMCNC: 33.3 G/DL (ref 31–36)
MCV RBC AUTO: 97.7 FL (ref 80–100)
PDW BLD-RTO: 12.9 % (ref 12.4–15.4)
PLATELET # BLD: 274 K/UL (ref 135–450)
PMV BLD AUTO: 9.5 FL (ref 5–10.5)
POTASSIUM SERPL-SCNC: 4.7 MMOL/L (ref 3.5–5.1)
RBC # BLD: 3.8 M/UL (ref 4–5.2)
SODIUM BLD-SCNC: 141 MMOL/L (ref 136–145)
TOTAL PROTEIN: 7 G/DL (ref 6.4–8.2)
TRIGL SERPL-MCNC: 166 MG/DL (ref 0–150)
TSH REFLEX FT4: 0.58 UIU/ML (ref 0.27–4.2)
VLDLC SERPL CALC-MCNC: 33 MG/DL
WBC # BLD: 7 K/UL (ref 4–11)

## 2019-10-02 PROCEDURE — 99215 OFFICE O/P EST HI 40 MIN: CPT | Performed by: NURSE PRACTITIONER

## 2019-10-02 PROCEDURE — G8484 FLU IMMUNIZE NO ADMIN: HCPCS | Performed by: NURSE PRACTITIONER

## 2019-10-02 PROCEDURE — 90746 HEPB VACCINE 3 DOSE ADULT IM: CPT | Performed by: NURSE PRACTITIONER

## 2019-10-02 PROCEDURE — 36415 COLL VENOUS BLD VENIPUNCTURE: CPT | Performed by: NURSE PRACTITIONER

## 2019-10-02 PROCEDURE — G8427 DOCREV CUR MEDS BY ELIG CLIN: HCPCS | Performed by: NURSE PRACTITIONER

## 2019-10-02 PROCEDURE — 90472 IMMUNIZATION ADMIN EACH ADD: CPT | Performed by: NURSE PRACTITIONER

## 2019-10-02 PROCEDURE — 90732 PPSV23 VACC 2 YRS+ SUBQ/IM: CPT | Performed by: NURSE PRACTITIONER

## 2019-10-02 PROCEDURE — G8419 CALC BMI OUT NRM PARAM NOF/U: HCPCS | Performed by: NURSE PRACTITIONER

## 2019-10-02 PROCEDURE — 1036F TOBACCO NON-USER: CPT | Performed by: NURSE PRACTITIONER

## 2019-10-02 PROCEDURE — 90471 IMMUNIZATION ADMIN: CPT | Performed by: NURSE PRACTITIONER

## 2019-10-02 PROCEDURE — 3044F HG A1C LEVEL LT 7.0%: CPT | Performed by: NURSE PRACTITIONER

## 2019-10-02 PROCEDURE — 2022F DILAT RTA XM EVC RTNOPTHY: CPT | Performed by: NURSE PRACTITIONER

## 2019-10-02 PROCEDURE — 3017F COLORECTAL CA SCREEN DOC REV: CPT | Performed by: NURSE PRACTITIONER

## 2019-10-02 RX ORDER — METOPROLOL TARTRATE 50 MG/1
TABLET, FILM COATED ORAL
Qty: 90 TABLET | Refills: 5 | Status: SHIPPED | OUTPATIENT
Start: 2019-10-02 | End: 2019-10-02 | Stop reason: SDUPTHER

## 2019-10-02 RX ORDER — ATORVASTATIN CALCIUM 40 MG/1
TABLET, FILM COATED ORAL
Qty: 90 TABLET | Refills: 2 | Status: SHIPPED | OUTPATIENT
Start: 2019-10-02 | End: 2019-10-02 | Stop reason: SDUPTHER

## 2019-10-02 RX ORDER — VALSARTAN AND HYDROCHLOROTHIAZIDE 160; 12.5 MG/1; MG/1
TABLET, FILM COATED ORAL
Qty: 30 TABLET | Refills: 3 | Status: SHIPPED | OUTPATIENT
Start: 2019-10-02 | End: 2020-02-24

## 2019-10-02 RX ORDER — VALSARTAN AND HYDROCHLOROTHIAZIDE 160; 12.5 MG/1; MG/1
TABLET, FILM COATED ORAL
Qty: 30 TABLET | Refills: 3 | Status: SHIPPED | OUTPATIENT
Start: 2019-10-02 | End: 2019-10-02 | Stop reason: SDUPTHER

## 2019-10-02 RX ORDER — GLYBURIDE-METFORMIN HYDROCHLORIDE 5; 500 MG/1; MG/1
TABLET ORAL
Qty: 60 TABLET | Refills: 3 | Status: SHIPPED | OUTPATIENT
Start: 2019-10-02 | End: 2019-10-02 | Stop reason: SDUPTHER

## 2019-10-02 RX ORDER — DICYCLOMINE HCL 20 MG
TABLET ORAL
Qty: 35 TABLET | Refills: 5 | Status: SHIPPED | OUTPATIENT
Start: 2019-10-02 | End: 2019-10-02 | Stop reason: SDUPTHER

## 2019-10-02 RX ORDER — METOPROLOL TARTRATE 50 MG/1
TABLET, FILM COATED ORAL
Qty: 90 TABLET | Refills: 5 | Status: SHIPPED | OUTPATIENT
Start: 2019-10-02 | End: 2020-09-25 | Stop reason: SDUPTHER

## 2019-10-02 RX ORDER — ATORVASTATIN CALCIUM 40 MG/1
TABLET, FILM COATED ORAL
Qty: 90 TABLET | Refills: 2 | Status: SHIPPED | OUTPATIENT
Start: 2019-10-02 | End: 2019-11-21

## 2019-10-02 RX ORDER — GLYBURIDE-METFORMIN HYDROCHLORIDE 5; 500 MG/1; MG/1
TABLET ORAL
Qty: 60 TABLET | Refills: 3 | Status: SHIPPED | OUTPATIENT
Start: 2019-10-02 | End: 2020-02-24

## 2019-10-02 RX ORDER — DICYCLOMINE HCL 20 MG
TABLET ORAL
Qty: 35 TABLET | Refills: 5 | Status: SHIPPED | OUTPATIENT
Start: 2019-10-02 | End: 2020-10-20

## 2019-10-02 ASSESSMENT — ENCOUNTER SYMPTOMS
TROUBLE SWALLOWING: 0
NAUSEA: 0
WHEEZING: 0
EYE PAIN: 0
DIARRHEA: 0
ORTHOPNEA: 0
SHORTNESS OF BREATH: 0
SORE THROAT: 0
CONSTIPATION: 0
PHOTOPHOBIA: 0
RHINORRHEA: 0
ABDOMINAL PAIN: 0
COLOR CHANGE: 0
CHEST TIGHTNESS: 0
SINUS PAIN: 0
VOMITING: 0
COUGH: 0
BACK PAIN: 0
BLURRED VISION: 0
SINUS PRESSURE: 0

## 2019-10-02 ASSESSMENT — PATIENT HEALTH QUESTIONNAIRE - PHQ9
SUM OF ALL RESPONSES TO PHQ9 QUESTIONS 1 & 2: 1
2. FEELING DOWN, DEPRESSED OR HOPELESS: 1
SUM OF ALL RESPONSES TO PHQ QUESTIONS 1-9: 1
SUM OF ALL RESPONSES TO PHQ QUESTIONS 1-9: 1
1. LITTLE INTEREST OR PLEASURE IN DOING THINGS: 0

## 2019-10-03 ENCOUNTER — TELEPHONE (OUTPATIENT)
Dept: INTERNAL MEDICINE CLINIC | Age: 59
End: 2019-10-03

## 2019-10-22 ENCOUNTER — TELEPHONE (OUTPATIENT)
Dept: INTERNAL MEDICINE CLINIC | Age: 59
End: 2019-10-22

## 2019-11-20 ENCOUNTER — OFFICE VISIT (OUTPATIENT)
Dept: CARDIOLOGY CLINIC | Age: 59
End: 2019-11-20
Payer: COMMERCIAL

## 2019-11-20 VITALS
WEIGHT: 149 LBS | BODY MASS INDEX: 26.4 KG/M2 | DIASTOLIC BLOOD PRESSURE: 70 MMHG | OXYGEN SATURATION: 97 % | HEART RATE: 78 BPM | HEIGHT: 63 IN | SYSTOLIC BLOOD PRESSURE: 116 MMHG

## 2019-11-20 DIAGNOSIS — I10 ESSENTIAL HYPERTENSION: ICD-10-CM

## 2019-11-20 DIAGNOSIS — E78.2 MIXED HYPERLIPIDEMIA: ICD-10-CM

## 2019-11-20 DIAGNOSIS — I48.91 ATRIAL FIBRILLATION, CONTROLLED (HCC): Primary | Chronic | ICD-10-CM

## 2019-11-20 PROCEDURE — G8419 CALC BMI OUT NRM PARAM NOF/U: HCPCS | Performed by: INTERNAL MEDICINE

## 2019-11-20 PROCEDURE — G8427 DOCREV CUR MEDS BY ELIG CLIN: HCPCS | Performed by: INTERNAL MEDICINE

## 2019-11-20 PROCEDURE — G8484 FLU IMMUNIZE NO ADMIN: HCPCS | Performed by: INTERNAL MEDICINE

## 2019-11-20 PROCEDURE — 93000 ELECTROCARDIOGRAM COMPLETE: CPT | Performed by: INTERNAL MEDICINE

## 2019-11-20 PROCEDURE — 99214 OFFICE O/P EST MOD 30 MIN: CPT | Performed by: INTERNAL MEDICINE

## 2019-11-20 PROCEDURE — 1036F TOBACCO NON-USER: CPT | Performed by: INTERNAL MEDICINE

## 2019-11-20 PROCEDURE — 3017F COLORECTAL CA SCREEN DOC REV: CPT | Performed by: INTERNAL MEDICINE

## 2019-11-21 DIAGNOSIS — E78.2 MIXED HYPERLIPIDEMIA: Primary | ICD-10-CM

## 2019-11-21 RX ORDER — ATORVASTATIN CALCIUM 40 MG/1
TABLET, FILM COATED ORAL
Qty: 90 TABLET | Refills: 3 | Status: SHIPPED | OUTPATIENT
Start: 2019-11-21 | End: 2020-09-25 | Stop reason: SDUPTHER

## 2019-12-13 ENCOUNTER — TELEPHONE (OUTPATIENT)
Dept: CARDIOLOGY CLINIC | Age: 59
End: 2019-12-13

## 2019-12-20 ENCOUNTER — OFFICE VISIT (OUTPATIENT)
Dept: INTERNAL MEDICINE CLINIC | Age: 59
End: 2019-12-20
Payer: COMMERCIAL

## 2019-12-20 VITALS
DIASTOLIC BLOOD PRESSURE: 76 MMHG | BODY MASS INDEX: 25.52 KG/M2 | HEIGHT: 63 IN | OXYGEN SATURATION: 98 % | WEIGHT: 144 LBS | RESPIRATION RATE: 16 BRPM | HEART RATE: 75 BPM | SYSTOLIC BLOOD PRESSURE: 117 MMHG

## 2019-12-20 DIAGNOSIS — I10 ESSENTIAL HYPERTENSION: ICD-10-CM

## 2019-12-20 DIAGNOSIS — Z01.818 PREOP EXAMINATION: Primary | ICD-10-CM

## 2019-12-20 DIAGNOSIS — E11.9 TYPE 2 DIABETES MELLITUS WITHOUT COMPLICATION, WITHOUT LONG-TERM CURRENT USE OF INSULIN (HCC): ICD-10-CM

## 2019-12-20 DIAGNOSIS — M16.11 PRIMARY OSTEOARTHRITIS OF RIGHT HIP: ICD-10-CM

## 2019-12-20 DIAGNOSIS — I48.20 CHRONIC ATRIAL FIBRILLATION (HCC): ICD-10-CM

## 2019-12-20 DIAGNOSIS — E78.2 MIXED HYPERLIPIDEMIA: ICD-10-CM

## 2019-12-20 LAB
ANION GAP SERPL CALCULATED.3IONS-SCNC: 16 MMOL/L (ref 3–16)
APTT: 39.7 SEC (ref 24.2–36.2)
BILIRUBIN, POC: NORMAL
BLOOD URINE, POC: NORMAL
BUN BLDV-MCNC: 26 MG/DL (ref 7–20)
CALCIUM SERPL-MCNC: 10.2 MG/DL (ref 8.3–10.6)
CHLORIDE BLD-SCNC: 106 MMOL/L (ref 99–110)
CLARITY, POC: NORMAL
CO2: 22 MMOL/L (ref 21–32)
COLOR, POC: NORMAL
CREAT SERPL-MCNC: 0.8 MG/DL (ref 0.6–1.1)
GFR AFRICAN AMERICAN: >60
GFR NON-AFRICAN AMERICAN: >60
GLUCOSE BLD-MCNC: 51 MG/DL (ref 70–99)
GLUCOSE URINE, POC: NORMAL
HCT VFR BLD CALC: 35.6 % (ref 36–48)
HEMOGLOBIN: 12.2 G/DL (ref 12–16)
INR BLD: 1.39 (ref 0.86–1.14)
KETONES, POC: NORMAL
LEUKOCYTE EST, POC: NORMAL
MCH RBC QN AUTO: 33 PG (ref 26–34)
MCHC RBC AUTO-ENTMCNC: 34.4 G/DL (ref 31–36)
MCV RBC AUTO: 95.9 FL (ref 80–100)
NITRITE, POC: NORMAL
PDW BLD-RTO: 12.6 % (ref 12.4–15.4)
PH, POC: 5.5
PLATELET # BLD: 304 K/UL (ref 135–450)
PMV BLD AUTO: 9.3 FL (ref 5–10.5)
POTASSIUM SERPL-SCNC: 4 MMOL/L (ref 3.5–5.1)
PROTEIN, POC: NORMAL
PROTHROMBIN TIME: 16.2 SEC (ref 10–13.2)
RBC # BLD: 3.71 M/UL (ref 4–5.2)
SODIUM BLD-SCNC: 144 MMOL/L (ref 136–145)
SPECIFIC GRAVITY, POC: >=1.03
UROBILINOGEN, POC: 0.2
WBC # BLD: 8.7 K/UL (ref 4–11)

## 2019-12-20 PROCEDURE — G8427 DOCREV CUR MEDS BY ELIG CLIN: HCPCS | Performed by: NURSE PRACTITIONER

## 2019-12-20 PROCEDURE — G8419 CALC BMI OUT NRM PARAM NOF/U: HCPCS | Performed by: NURSE PRACTITIONER

## 2019-12-20 PROCEDURE — G8484 FLU IMMUNIZE NO ADMIN: HCPCS | Performed by: NURSE PRACTITIONER

## 2019-12-20 PROCEDURE — 99214 OFFICE O/P EST MOD 30 MIN: CPT | Performed by: NURSE PRACTITIONER

## 2019-12-20 PROCEDURE — 81002 URINALYSIS NONAUTO W/O SCOPE: CPT | Performed by: NURSE PRACTITIONER

## 2019-12-20 PROCEDURE — 2022F DILAT RTA XM EVC RTNOPTHY: CPT | Performed by: NURSE PRACTITIONER

## 2019-12-20 PROCEDURE — 36415 COLL VENOUS BLD VENIPUNCTURE: CPT | Performed by: NURSE PRACTITIONER

## 2019-12-20 ASSESSMENT — ENCOUNTER SYMPTOMS
EYE PAIN: 0
BACK PAIN: 0
SINUS PRESSURE: 0
FACIAL SWELLING: 0
SINUS PAIN: 0
CONSTIPATION: 0
WHEEZING: 0
ABDOMINAL PAIN: 0
PHOTOPHOBIA: 0
COLOR CHANGE: 0
DIARRHEA: 0
TROUBLE SWALLOWING: 0
CHEST TIGHTNESS: 0
VOMITING: 0
SHORTNESS OF BREATH: 0
NAUSEA: 0
SORE THROAT: 0
RHINORRHEA: 0
COUGH: 0

## 2019-12-21 LAB
ESTIMATED AVERAGE GLUCOSE: 125.5 MG/DL
HBA1C MFR BLD: 6 %
MRSA SCREEN RT-PCR: NORMAL

## 2019-12-22 LAB — URINE CULTURE, ROUTINE: NORMAL

## 2019-12-23 ENCOUNTER — TELEPHONE (OUTPATIENT)
Dept: INTERNAL MEDICINE CLINIC | Age: 59
End: 2019-12-23

## 2020-03-23 ENCOUNTER — TELEPHONE (OUTPATIENT)
Dept: INTERNAL MEDICINE CLINIC | Age: 60
End: 2020-03-23

## 2020-03-23 NOTE — TELEPHONE ENCOUNTER
Patient says she is having chest pain, hurt when she takes deep breathe, sore throat, cough, started yesterday. No other sx noted. Patient does work for the [employer]. Say she is going to work tomorrow so if you call he has to be after 230. Patient says she does have my chart but doesn't remember her password.  So she cant do evisit

## 2020-03-23 NOTE — TELEPHONE ENCOUNTER
Pt said someone in her office tested positive for Covid 19. She said it is suggested that she stay home for 2 weeks because she has Afib and Diabetes. She said her office will allow this if she has a note from her provider. She wants to know if Leela Graham will give her a note. cll patient @ [phone].

## 2020-03-24 ENCOUNTER — TELEPHONE (OUTPATIENT)
Dept: INTERNAL MEDICINE CLINIC | Age: 60
End: 2020-03-24

## 2020-03-24 ENCOUNTER — TELEPHONE (OUTPATIENT)
Dept: CARDIOLOGY CLINIC | Age: 60
End: 2020-03-24

## 2020-03-24 NOTE — TELEPHONE ENCOUNTER
Called patient and let her know of Dr Moises Meyer recommendations. She said that she will call back with increased chest discomfort.

## 2020-03-24 NOTE — TELEPHONE ENCOUNTER
Agree that she needs to keep following her symptoms. Her left-sided chest pain symptoms appear related to pleurisy.   If she develops retrosternal chest heaviness pressure which worsens with minimal activity she may have to be seen in the office but I would prefer her  not,come out off self  quarantine for at least 14 days

## 2020-03-24 NOTE — TELEPHONE ENCOUNTER
Chief Complaint: For the last few days has had left side pain under left breast, worsens with deep breaths and coughing. Not getting worse, just has been going on for a few days. Coughing \"here and there\" bc of drainage. She does have seasonal and believes any coughing is due to that. Pains are different than it would be for heartburn. Wonders if it is stress due to that fact that she works for [employer] and employee in building has tested positive for COVID-19    Fever: no  96.6    No body aches     Pain does not radiate down arm or up neck    Patient can take BP at home   Last yesterday:  126/95    Never feels symptoms with AFIB    Doesn't feel like her heart is racing    Medication compliant - has taken extra aspirin each day.     No swelling     Please review and advise

## 2020-03-24 NOTE — TELEPHONE ENCOUNTER
Patient is made aware that [facility] if patient is having chest pain needs to go to the Er. Per patient its not really chest pain it just hurst when she takes a deep breath. She doesn't have a fever. Patient says she will not go to the ER due to all of the high cases they have of the [address]. But will call her heart doctor because she does have heart problems and  to make sure its not a heart attack. Per Ty tell the patient to call the hotline, gave patient [phone] and I gave patient my chart help desk number so they can help her setup my chart in the future.

## 2020-04-08 ENCOUNTER — VIRTUAL VISIT (OUTPATIENT)
Dept: INTERNAL MEDICINE CLINIC | Age: 60
End: 2020-04-08
Payer: COMMERCIAL

## 2020-04-08 PROCEDURE — 99442 PR PHYS/QHP TELEPHONE EVALUATION 11-20 MIN: CPT | Performed by: NURSE PRACTITIONER

## 2020-04-08 RX ORDER — AMOXICILLIN AND CLAVULANATE POTASSIUM 875; 125 MG/1; MG/1
1 TABLET, FILM COATED ORAL 2 TIMES DAILY
Qty: 20 TABLET | Refills: 0 | Status: SHIPPED | OUTPATIENT
Start: 2020-04-08 | End: 2020-04-18

## 2020-04-08 NOTE — PROGRESS NOTES
Jeff Mckinney is a 61 y.o. female evaluated via telephone on 4/8/2020. Consent:  She and/or health care decision maker is aware that that she may receive a bill for this telephone service, depending on her insurance coverage, and has provided verbal consent to proceed: Yes    Documentation:  I communicated with the patient and/or health care decision maker about URI . Details of this discussion including any medical advice provided:     Acute sinusitis  This problem is new and fluctuating. Patient states that she has \"sinus pressure, congestion, HA and productive cough for ~10 days\". She denies associated fevers/chills/SOB/sore throat\". She states that she has really bad allergies and d/t wanting to save money has had her windows open the past few days which she states she thinks has made her congestion worse. She states that she is taking OTC antihistamines with little to no improvement. States that she has had sinus infections in the past and this is how they feel. Denies any other acute concerns. I affirm this is a Patient Initiated Episode with an Established Patient who has not had a related appointment within my department in the past 7 days or scheduled within the next 24 hours. Assessment/Plan  Diagnoses and all orders for this visit:    Acute non-recurrent frontal sinusitis  -     amoxicillin-clavulanate (AUGMENTIN) 875-125 MG per tablet; Take 1 tablet by mouth 2 times daily for 10 days    Patient instructed should fevers/chills/SOB/CP or any other concerning s/s occur, to go to ER ASAP  All questions addressed and answered, patient agrees with plan of care.      Total Time: minutes: 11-20 minutes    Roseline Rodriguez

## 2020-06-03 NOTE — PROGRESS NOTES
[facility]      Cardiology Follow up    Radhika Tinoco  1960 June 5, 2020     CC: \"I am doing okay. \"     HPI:  The patient is 61 y.o. female with a past medical history significant for DM II, HTN, HLD and Afib. Today, she is here for follow up for atrial fibrillation. She had some shortness of breath in March but that has since resolved. She is hoping to retire in about 6-7 years. She will go back to [employer] at that time. Patient denies exertional chest pain/pressure, dyspnea at rest, BORJA, PND, orthopnea, palpitations, lightheadedness, weight changes, changes in LE edema, and syncope. Patient reports compliance to her medications.       Past Medical History:   Diagnosis Date    Atrial fibrillation, controlled ([address] 3/27/2013    Essential hypertension 11/1/2011    Mixed hyperlipidemia 11/1/2011    Psoriasis     Type II or unspecified type diabetes mellitus without mention of complication, not stated as uncontrolled      Past Surgical History:   Procedure Laterality Date    BREAST REDUCTION SURGERY  2017    JOINT REPLACEMENT Right 12/28/2020    LUMBAR DISC SURGERY      ROTATOR CUFF REPAIR Right December 2014    Dr. Mae Bedolla at [facility]     Family History   Problem Relation Age of Onset    Other Mother     Heart Disease Mother     Heart Disease Father     Atrial Fibrillation Father     Cancer Sister      Social History     Tobacco Use    Smoking status: Never Smoker    Smokeless tobacco: Never Used   Substance Use Topics    Alcohol use: Yes     Comment: rare    Drug use: No       Allergies   Allergen Reactions    Codeine      Current Outpatient Medications   Medication Sig Dispense Refill    rivaroxaban (XARELTO) 20 MG TABS tablet TAKE ONE TABLET BY MOUTH DAILY 30 tablet 0    glyBURIDE-metformin (GLUCOVANCE) 5-500 MG per tablet TAKE ONE TABLET BY MOUTH TWICE A DAY 60 tablet 3    valsartan-hydrochlorothiazide (DIOVAN-HCT) 160-12.5 MG per tablet TAKE

## 2020-06-05 ENCOUNTER — OFFICE VISIT (OUTPATIENT)
Dept: CARDIOLOGY CLINIC | Age: 60
End: 2020-06-05
Payer: COMMERCIAL

## 2020-06-05 VITALS
SYSTOLIC BLOOD PRESSURE: 120 MMHG | OXYGEN SATURATION: 98 % | HEIGHT: 63 IN | DIASTOLIC BLOOD PRESSURE: 70 MMHG | BODY MASS INDEX: 26.75 KG/M2 | HEART RATE: 72 BPM | WEIGHT: 151 LBS | TEMPERATURE: 98.1 F

## 2020-06-05 PROCEDURE — 93000 ELECTROCARDIOGRAM COMPLETE: CPT | Performed by: INTERNAL MEDICINE

## 2020-06-05 PROCEDURE — 3017F COLORECTAL CA SCREEN DOC REV: CPT | Performed by: INTERNAL MEDICINE

## 2020-06-05 PROCEDURE — G8427 DOCREV CUR MEDS BY ELIG CLIN: HCPCS | Performed by: INTERNAL MEDICINE

## 2020-06-05 PROCEDURE — 1036F TOBACCO NON-USER: CPT | Performed by: INTERNAL MEDICINE

## 2020-06-05 PROCEDURE — 99214 OFFICE O/P EST MOD 30 MIN: CPT | Performed by: INTERNAL MEDICINE

## 2020-06-05 PROCEDURE — G8419 CALC BMI OUT NRM PARAM NOF/U: HCPCS | Performed by: INTERNAL MEDICINE

## 2020-06-05 NOTE — PATIENT INSTRUCTIONS
Patient Education        Atrial Fibrillation: Care Instructions  Your Care Instructions     Atrial fibrillation is an irregular and often fast heartbeat. Treating this condition is important for several reasons. It can cause blood clots, which can travel from your heart to your brain and cause a stroke. If you have a fast heartbeat, you may feel lightheaded, dizzy, and weak. An irregular heartbeat can also increase your risk for heart failure. Atrial fibrillation is often the result of another heart condition, such as high blood pressure or coronary artery disease. Making changes to improve your heart condition will help you stay healthy and active. Follow-up care is a key part of your treatment and safety. Be sure to make and go to all appointments, and call your doctor if you are having problems. It's also a good idea to know your test results and keep a list of the medicines you take. How can you care for yourself at home? Medicines  · Take your medicines exactly as prescribed. Call your doctor if you think you are having a problem with your medicine. You will get more details on the specific medicines your doctor prescribes. · If your doctor has given you a blood thinner to prevent a stroke, be sure you get instructions about how to take your medicine safely. Blood thinners can cause serious bleeding problems. · Do not take any vitamins, over-the-counter drugs, or herbal products without talking to your doctor first.  Lifestyle changes  · Do not smoke. Smoking can increase your chance of a stroke and heart attack. If you need help quitting, talk to your doctor about stop-smoking programs and medicines. These can increase your chances of quitting for good. · Eat a heart-healthy diet. · Stay at a healthy weight. Lose weight if you need to. · Limit alcohol to 2 drinks a day for men and 1 drink a day for women. Too much alcohol can cause health problems. · Avoid colds and flu.  Get a pneumococcal vaccine

## 2020-07-13 ENCOUNTER — OFFICE VISIT (OUTPATIENT)
Dept: CARDIOLOGY CLINIC | Age: 60
End: 2020-07-13
Payer: COMMERCIAL

## 2020-07-13 VITALS
DIASTOLIC BLOOD PRESSURE: 68 MMHG | OXYGEN SATURATION: 98 % | BODY MASS INDEX: 26.58 KG/M2 | HEART RATE: 84 BPM | SYSTOLIC BLOOD PRESSURE: 104 MMHG | TEMPERATURE: 98.8 F | HEIGHT: 63 IN | WEIGHT: 150 LBS

## 2020-07-13 PROCEDURE — 93000 ELECTROCARDIOGRAM COMPLETE: CPT | Performed by: INTERNAL MEDICINE

## 2020-07-13 PROCEDURE — 99204 OFFICE O/P NEW MOD 45 MIN: CPT | Performed by: INTERNAL MEDICINE

## 2020-07-13 PROCEDURE — G8427 DOCREV CUR MEDS BY ELIG CLIN: HCPCS | Performed by: INTERNAL MEDICINE

## 2020-07-13 PROCEDURE — G8419 CALC BMI OUT NRM PARAM NOF/U: HCPCS | Performed by: INTERNAL MEDICINE

## 2020-07-13 PROCEDURE — 3017F COLORECTAL CA SCREEN DOC REV: CPT | Performed by: INTERNAL MEDICINE

## 2020-07-13 PROCEDURE — 1036F TOBACCO NON-USER: CPT | Performed by: INTERNAL MEDICINE

## 2020-07-13 NOTE — PROGRESS NOTES
Cardiac Electrophysiology Consultation   Date: 7/13/2020  Reason for Consultation: Atrial fibrillation / Atrial flutter  Consult Requesting Physician: Ying Alfredo MD    Chief Complaint:   Chief Complaint   Patient presents with    New Patient    Atrial Fibrillation     no cardiac complaints        HPI: Florecita Baldwin is a 61 y.o. patient with a history of DM type II, HTN, HLD, atrial flutter and atrial fibrillation. Today, she presents to office for initial consultation to discuss treatment options for her atrial fibrillation and atrial flutter. She states she was asymptomatic with her atrial fibrillation and was unaware she had it before she was diagnosed. Past Medical History:   Diagnosis Date    Atrial fibrillation, controlled ([address] 3/27/2013    Essential hypertension 11/1/2011    Mixed hyperlipidemia 11/1/2011    Psoriasis     Type II or unspecified type diabetes mellitus without mention of complication, not stated as uncontrolled         Past Surgical History:   Procedure Laterality Date    BREAST REDUCTION SURGERY  2017    JOINT REPLACEMENT Right 12/28/2020    LUMBAR DISC SURGERY      ROTATOR CUFF REPAIR Right December 2014    [facility] Lenny Leija [facility]       Allergies: Allergies   Allergen Reactions    Codeine        Medication:   Prior to Admission medications    Medication Sig Start Date End Date Taking?  Authorizing Provider   glyBURIDE-metformin (GLUCOVANCE) 5-500 MG per tablet TAKE ONE TABLET BY MOUTH TWICE A DAY 6/29/20  Yes JAZMYN Hall CNP   rivaroxaban (XARELTO) 20 MG TABS tablet TAKE ONE TABLET BY MOUTH DAILY 6/26/20  Yes JAZMYN Renteria CNP   valsartan-hydrochlorothiazide (DIOVAN-HCT) 160-12.5 MG per tablet TAKE ONE TABLET BY MOUTH DAILY 6/26/20  Yes JAZMYN Sky CNP   atorvastatin (LIPITOR) 40 MG tablet Take 1 tablet by mouth daily 11/21/19  Yes JAZMYN Sky - CNP   metoprolol tartrate (LOPRESSOR) 50 MG tablet 1 tablet by mouth daily. Patient taking differently: Take 50 mg by mouth 2 times daily  10/2/19  Yes JAZMYN Morris CNP   dicyclomine (BENTYL) 20 MG tablet TAKE ONE TABLET BY MOUTH THREE TIMES A DAY AS NEEDED FOR ABDOMINAL PAIN 10/2/19  Yes Dennie Gammons Wenning, APRN - CNP   Apremilast 30 MG TABS Take 30 mg by mouth daily  4/19/18  Yes Historical Provider, MD       Social History:   reports that she has never smoked. She has never used smokeless tobacco. She reports current alcohol use. She reports that she does not use drugs. Family History:  family history includes Atrial Fibrillation in her father; Cancer in her sister; Heart Disease in her father and mother; Other in her mother. Reviewed. Denies family history of sudden cardiac death, arrhythmia, premature CAD    Review of System:    · General ROS: negative for - chills, fever   · Psychological ROS: negative for - anxiety or depression  · Ophthalmic ROS: negative for - eye pain or loss of vision  · ENT ROS: negative for - epistaxis, headaches, nasal discharge, sore throat   · Allergy and Immunology ROS: negative for - hives, nasal congestion   · Hematological and Lymphatic ROS: negative for - bleeding problems, blood clots, bruising or jaundice  · Endocrine ROS: negative for - skin changes, temperature intolerance or unexpected weight changes  · Respiratory ROS: negative for - cough, hemoptysis, pleuritic pain, SOB, sputum changes or wheezing  · Cardiovascular ROS: Per HPI.    · Gastrointestinal ROS: negative for - abdominal pain, blood in stools, diarrhea, hematemesis, melena, nausea/vomiting or swallowing difficulty/pain  · Genito-Urinary ROS: negative for - dysuria or incontinence  · Musculoskeletal ROS: negative for - joint swelling or muscle pain  · Neurological ROS: negative for - confusion, dizziness, gait disturbance, headaches, numbness/tingling, seizures, speech problems, tremors, visual changes or discussed in detail. The patient was also counseled at length about the risks of melba Covid-19 in the elly-operative and post-operative states including the recovery window of their procedure. The patient was made aware that melba Covid-19 after a surgical procedure may worsen their prognosis for recovering from the virus and lend to a higher morbidity and or mortality risk. The patient was given the option of postponing their procedure. The patient wishes to deliberate further before making her decision to proceed with both ablations, one for right atrial flutter and the other for persistent atrial fibrillation. If she wishes to proceed she will contact my nurse Unique at which time we will schedule a Right atrial flutter with Carto/JOAO prior to. He will not need to hold any medications prior to this ablation. Immediately after the right atrial flutter, we will start Flecanide to suppress atrial fibrillation while we await for the atrial fibrillation ablation. We will also schedule the atrial fibrillation ablation with Carto/JOAO prior to. This will occur at least 4 weeks after the right atrial flutter ablation. The patient will need to hold the anti-arrhythmic medication for 7 days prior to. The patient will also hold the NOAC (novel oral anticoagulant) as per our protocol. A cardiac CTA prior to procedure for pulmonary vein mapping will be ordered. We will order BMP, CBC, PT/INR, and Type & Screen prior to the procedure. In order to for patient to have the ablation completed at the earliest date and time patient is agreeable to have the both procedures (atrial fibrillation and atrial flutter ablation) completed by my colleague Dr. Elly Rice at the same time. Thank you for allowing me to participate in the care of Jody Montoya. All questions and concerns were addressed to the patient/family. Alternatives to my treatment were discussed.      This note was scribed in the presence of Dr. Alexia Sanz Mike Ordaz MD by See Avilez, MAXIMILIAN. The scribe's documentation has been prepared under my direction and personally reviewed by me in its entirety. I confirm that the note above accurately reflects all work, physical examination, the discussion of treatments and procedures, and medical decision making performed by me.     Yoli Silva MD, MS, [facility], [facility]  Cardiac Electrophysiology  [address]  [address] [facility], [address] [place] [address]  [address] [facility] [address]  [phone]

## 2020-07-13 NOTE — PATIENT INSTRUCTIONS
If you choose to proceed with ablation please contact Dr. Dayday Correa at [phone]. Right Atrial Flutter Ablation     Procedure Date : __________________    Arrival Time:_____________________    Procedure Time:___________________    The morning of your procedure you will park in the hospital parking lot and report directly to the cath lab to check in.      Pre-Procedure Instructions   1. You will need to fast (nothing to eat or drink)  for at least 8 hours prior to procedure. 2. Do NOT hold your Xarelto prior to the procedure. Take the morning of the procedure. 3. Hold all diabetic medications including, glyBURIDE-metformin (GLUCOVANCE) the morning of the procedure. If you take Lantus/Levemir only take ½ your normal dose the evening before. 4. Do not use any lotions, creams or perfume the morning of procedure. 5. Pre-procedure lab work & COVID test will need to be completed 5-7 days prior to procedure. 6. Please have a responsible adult to drive you home after procedure, you may stay overnight. 7. Cath lab will provide you with all post procedure instructions       Atrial Fibrillation Ablation Pre procedure Instructions    As part of your pre procedure work up you will need to get a CT scan of your heart. This scan is completed in order to give Dr. Mark Dumont a map of the atrium (chamber of your heart) where he will be doing the ablation. CTA Pre procedure instructions      The office will be in contact to schedule this test in the near future.  -Arrive 20 minutes prior to scheduled testing time  -Nothing to eat or drink for at least 4 hours prior to test   -You will need to get lab work 5-7 days prior to this test (Renal panel) to check your kidney function.  You will be receiving intravenous dye for the procedure and the doctor needs to check to make sure your kidneys are functioning properly prior to the test.    Atrial Fibrillation Ablation Pre procedure Instructions      Procedure Date : __________________    Arrival Time:_____________________    Procedure Time:___________________    The morning of your procedure you will park in the hospital parking lot and report directly to the cath lab to check in.      Pre-Procedure Instructions   · You will need to fast (nothing to eat or drink) for at least 8 hours prior to procedure. · You will need to hold your Flecainide for 7 days prior to the procedure. (You will be started on this medication after your Right atrial flutter ablation). · You will need to hold your Xarelto for 12 hours prior to the procedure. · Do NOT hold your Warfarin (Coumadin)  · You will need to hold all diabetic medications including, Metformin the morning of the procedure. If you take Lantus/Levemir only take ½ your normal dose the evening before. · Do not use any lotions, creams or perfume the morning of procedure. · You will need to complete pre-procedure lab work prior to completing your COVID test which is scheduled for __________________  · Please have a responsible adult to drive you home after procedure, you will stay overnight. · Cath lab will provide you with all post procedure instructions  · A 3 month follow up will be scheduled with Dr. Manisha Cha post procedure    Patient Education        Atrial Fibrillation: Care Instructions  Your Care Instructions     Atrial fibrillation is an irregular and often fast heartbeat. Treating this condition is important for several reasons. It can cause blood clots, which can travel from your heart to your brain and cause a stroke. If you have a fast heartbeat, you may feel lightheaded, dizzy, and weak. An irregular heartbeat can also increase your risk for heart failure. Atrial fibrillation is often the result of another heart condition, such as high blood pressure or coronary artery disease. Making changes to improve your heart condition will help you stay healthy and active.   Follow-up care is a key part of your treatment and safety. Be sure to make and go to all appointments, and call your doctor if you are having problems. It's also a good idea to know your test results and keep a list of the medicines you take. How can you care for yourself at home? Medicines  · Take your medicines exactly as prescribed. Call your doctor if you think you are having a problem with your medicine. You will get more details on the specific medicines your doctor prescribes. · If your doctor has given you a blood thinner to prevent a stroke, be sure you get instructions about how to take your medicine safely. Blood thinners can cause serious bleeding problems. · Do not take any vitamins, over-the-counter drugs, or herbal products without talking to your doctor first.  Lifestyle changes  · Do not smoke. Smoking can increase your chance of a stroke and heart attack. If you need help quitting, talk to your doctor about stop-smoking programs and medicines. These can increase your chances of quitting for good. · Eat a heart-healthy diet. · Stay at a healthy weight. Lose weight if you need to. · Limit alcohol to 2 drinks a day for men and 1 drink a day for women. Too much alcohol can cause health problems. · Avoid colds and flu. Get a pneumococcal vaccine shot. If you have had one before, ask your doctor whether you need another dose. Get a flu shot every year. If you must be around people with colds or flu, wash your hands often. Activity  · If your doctor recommends it, get more exercise. Walking is a good choice. Bit by bit, increase the amount you walk every day. Try for at least 30 minutes on most days of the week. You also may want to swim, bike, or do other activities. Your doctor may suggest that you join a cardiac rehabilitation program so that you can have help increasing your physical activity safely. · Start light exercise if your doctor says it is okay. Even a small amount will help you get stronger, have more energy, and manage stress. Walking is an easy way to get exercise. Start out by walking a little more than you did in the hospital. Gradually increase the amount you walk. · When you exercise, watch for signs that your heart is working too hard. You are pushing too hard if you cannot talk while you are exercising. If you become short of breath or dizzy or have chest pain, sit down and rest immediately. · Check your pulse regularly. Place two fingers on the artery at the palm side of your wrist, in line with your thumb. If your heartbeat seems uneven or fast, talk to your doctor. When should you call for help? [place] [address] anytime you think you may need emergency care. For example, call if:  · You have symptoms of a heart attack. These may include:  ? Chest pain or pressure, or a strange feeling in the chest.  ? Sweating. ? Shortness of breath. ? Nausea or vomiting. ? Pain, pressure, or a strange feeling in the back, neck, jaw, or upper belly or in one or both shoulders or arms. ? Lightheadedness or sudden weakness. ? A fast or irregular heartbeat. After you call 911, the  may tell you to chew 1 adult-strength or 2 to 4 low-dose aspirin. Wait for an ambulance. Do not try to drive yourself. · You have symptoms of a stroke. These may include:  ? Sudden numbness, tingling, weakness, or loss of movement in your face, arm, or leg, especially on only one side of your body. ? Sudden vision changes. ? Sudden trouble speaking. ? Sudden confusion or trouble understanding simple statements. ? Sudden problems with walking or balance. ? A sudden, severe headache that is different from past headaches. · You passed out (lost consciousness). Call your doctor now or seek immediate medical care if:  · You have new or increased shortness of breath. · You feel dizzy or lightheaded, or you feel like you may faint. · Your heart rate becomes irregular. · You can feel your heart flutter in your chest or skip heartbeats.  Tell your doctor if these symptoms are new or worse. Watch closely for changes in your health, and be sure to contact your doctor if you have any problems. Where can you learn more? Go to https://D4PpeAdvanced LEDs.MyWealth. org and sign in to your [employer] account. Enter U020 in the [facility] box to learn more about \"Atrial Fibrillation: Care Instructions. \"     If you do not have an account, please click on the \"Sign Up Now\" link. Current as of: December 16, 2019               Content Version: 12.5  © 0632-2738 [facility]. Care instructions adapted under license by [facility] ([facility]). If you have questions about a medical condition or this instruction, always ask your healthcare professional. [address] any warranty or liability for your use of this information. Patient Education        Learning About Atrial Fibrillation  What is atrial fibrillation? Atrial fibrillation (say \"AY-tree-eric uad-cldj-NFN-shun\") is the most common type of irregular heartbeat (arrhythmia). Normally, the heart beats in a strong, steady rhythm. In atrial fibrillation, a problem with the heart's electrical system causes the two upper parts of the heart (the atria) to quiver, or fibrillate. Your heart rate also may be faster than normal.  Atrial fibrillation can be dangerous because if the heartbeat isn't strong and steady, blood can collect, or pool, in the atria. And pooled blood is more likely to form clots. Clots can travel to the brain, block blood flow, and cause a stroke. Atrial fibrillation can also lead to heart failure. Treatment for atrial fibrillation helps prevent stroke and heart failure. It also helps relieve symptoms. Atrial fibrillation is often caused by another heart problem. It may happen after heart surgery. It may also be caused by other problems, such as an overactive thyroid gland or lung disease.   Many people with atrial fibrillation are able to live full and active This lifestyle may help reduce how often you have episodes of atrial fibrillation. If you are overweight, losing weight can help relieve symptoms. To have a heart-healthy lifestyle:  · Don't smoke. · Eat heart-healthy foods. · Be active. Talk to your doctor about what type and level of exercise is safe for you. · Stay at a healthy weight. Lose weight if you need to. · Avoid alcohol if it triggers symptoms. · Manage other health problems such as high blood pressure, high cholesterol, and diabetes. · Avoid getting sick from the flu. Get a flu shot every year. · Manage stress. Where can you learn more? Go to https://O3b NetworkspeSNRLabs.GROUNDBOOTH. org and sign in to your [employer] account. Enter C340 in the [employer] box to learn more about \"Learning About Atrial Fibrillation. \"     If you do not have an account, please click on the \"Sign Up Now\" link. Current as of: December 16, 2019               Content Version: 12.5  © 4223-9752 [employer]. Care instructions adapted under license by [facility] ([facility]). If you have questions about a medical condition or this instruction, always ask your healthcare professional. [place] [address] any warranty or liability for your use of this information. Patient Education        Learning About Atrial Flutter  What is atrial flutter? Atrial flutter is a type of heartbeat problem (arrhythmia) that usually causes a fast heart rate. This fast rate is caused by changes in the electrical system of your heart. Normally, the heart beats in a strong, steady rhythm. In atrial flutter, a problem with the heart's electrical system causes the two upper parts of the heart (the right atrium and the left atrium) to flutter, or beat very fast. Atrial flutter might be diagnosed using an an electrocardiogram (EKG). An EKG translates the heart's electrical activity into line tracings on paper. This problem can be dangerous.  If the heartbeat isn't strong and steady, blood can collect, or pool, in the atria. And pooled blood is more likely to form clots. Clots can travel to the brain, block blood flow, and cause a stroke. Over time, atrial flutter can also lead to heart failure. Treatment for atrial flutter helps prevent stroke and heart failure. It also helps relieve symptoms. Atrial flutter is often caused by another heart condition, such as coronary artery disease or another heart rhythm problem. It may happen after heart surgery. Many people with atrial flutter are able to live full and active lives. What are the symptoms? Some people have symptoms when they have episodes of atrial flutter. But other people don't notice any symptoms. If you have symptoms, you may feel:  · A fluttering, racing, or pounding feeling in your chest (palpitations). · Weak or tired. · Dizzy or lightheaded. · Short of breath. · Chest pain. You may notice signs of atrial flutter when you check your pulse. Your pulse may seem fast.  How is atrial flutter treated? Treatments can help you feel better and prevent future problems, especially stroke and heart failure. The main types of treatment slow the heart rate and help prevent stroke. Your treatment will depend on the cause of your atrial flutter, your symptoms, and your risk for stroke. Treatments include:  · Medicines to slow your heart rate. They may also help relieve your symptoms. Or you may take a medicine to try to stop the flutter from happening. · Blood-thinning medicines to help prevent stroke. You and your doctor can decide if you will take medicine to lower your risk. · Electrical cardioversion to stop atrial flutter. An electric current is used to shock the heart back to a normal rhythm. · Catheter ablation to stop atrial flutter. Thin wires are used to send energy to destroy the tiny areas of heart tissue that are causing atrial flutter. How can you live well with it?   You can live well and help manage atrial flutter by having a heart-healthy lifestyle. To have a heart-healthy lifestyle:  · Don't smoke. · Eat heart-healthy foods. · Be active. Talk to your doctor about what type and level of exercise is safe for you. · Stay at a healthy weight. Lose weight if you need to. · Manage stress. · Avoid alcohol if it triggers symptoms. · Manage other health problems such as high blood pressure, high cholesterol, and diabetes. · Avoid getting sick from the flu. Get a flu shot every year. When should you call for help? [id number] anytime you think you may need emergency care. For example, call if:  · You have symptoms of a stroke. These may include:  ? Sudden numbness, tingling, weakness, or loss of movement in your face, arm, or leg, especially on only one side of your body. ? Sudden vision changes. ? Sudden trouble speaking. ? Sudden confusion or trouble understanding simple statements. ? Sudden problems with walking or balance. ? A sudden, severe headache that is different from past headaches. Call your doctor now or seek immediate medical care if:  · You have new or increased shortness of breath. · You feel dizzy or lightheaded, or you feel like you may faint. Watch closely for changes in your health, and be sure to contact your doctor if you have any problems. Follow-up care is a key part of your treatment and safety. Be sure to make and go to all appointments, and call your doctor if you are having problems. It's also a good idea to know your test results and keep a list of the medicines you take. Where can you learn more? Go to https://vidal.Lumenz. org and sign in to your [employer] account. Enter X969 in the [facility] box to learn more about \"Learning About Atrial Flutter. \"     If you do not have an account, please click on the \"Sign Up Now\" link. Current as of: December 16, 2019               Content Version: 12.5  © 6009-0872 Healthwise, [facility].    Care probably be able to go back to work and to your normal routine in 1 or 2 days. You may have swelling, bruising, or a small lump around the site where the catheters went into your body. These should go away in 3 to 4 weeks. You may have to take some medicines for a while. Follow-up care is a key part of your treatment and safety. Be sure to make and go to all appointments, and call your doctor if you are having problems. It's also a good idea to know your test results and keep a list of the medicines you take. Where can you learn more? Go to https://[place] Broad Vision Telecompepiceweb.[email or web address] org and sign in to your Litographs account. Enter D096 in the [employer] box to learn more about \"Learning About Catheter Ablation for Heart Rhythm Problems. \"     If you do not have an account, please click on the \"Sign Up Now\" link. Current as of: December 16, 2019               Content Version: 12.5  © 2006-2020 [employer]. Care instructions adapted under license by [facility] ([facility]). If you have questions about a medical condition or this instruction, always ask your healthcare professional. [place] [address] any warranty or liability for your use of this information. Patient Education        Electrophysiology Study and Catheter Ablation: Before Your Procedure  What is an electrophysiology study and catheter ablation? An electrophysiology study is a test to see if there is a problem with your heart rhythm and to find out how to fix it. It is also called an EP study. A catheter ablation procedure is sometimes done at the same time. This procedure destroys (ablates) small areas of your heart that are causing your heart rhythm problem. The doctor puts plastic tubes called catheters into blood vessels in your groin, arm, or neck. He or she then uses an X-ray machine to guide long wires through the tubes to your heart.   The doctor can use these wires to record your heart's electrical signals. If the doctor thinks your problem can be fixed with ablation, he or she can use the wires to destroy a small part of your heart tissue. This is most often done with radio waves. You will probably be awake during the procedure. But you might be asleep. The doctor will give you medicines to help you feel relaxed and to numb the areas where the catheters go in. An EP study and ablation can take 2 to 6 hours. In rare cases, it can take longer. If you have an EP study only and you don't need more treatment, you may go home the same day. But if you also have ablation, you may stay overnight in the hospital. How long you stay in the hospital depends on the type of ablation you have. Do not exercise hard or lift anything heavy for a week. You may be able to go back to work and to your normal routine in 1 or 2 days. Follow-up care is a key part of your treatment and safety. Be sure to make and go to all appointments, and call your doctor if you are having problems. It's also a good idea to know your test results and keep a list of the medicines you take. How do you prepare for the procedure? Procedures can be stressful. This information will help you understand what you can expect. And it will help you safely prepare for your procedure. Preparing for the procedure  · Be sure you have someone to take you home. Anesthesia and pain medicine will make it unsafe for you to drive or get home on your own. · Understand exactly what procedure is planned, along with the risks, benefits, and other options. · Tell your doctor ALL the medicines, vitamins, supplements, and herbal remedies you take. Some may increase the risk of problems during your procedure. Your doctor will tell you if you should stop taking any of them before the procedure and how soon to do it. · If you take aspirin or some other blood thinner, ask your doctor if you should stop taking it before your procedure.  Make sure that you understand exactly what your doctor wants you to do. These medicines increase the risk of bleeding. · Make sure your doctor and the hospital have a copy of your advance directive. If you don't have one, you may want to prepare one. It lets others know your health care wishes. It's a good thing to have before any type of surgery or procedure. What happens on the day of the procedure? · Follow the instructions exactly about when to stop eating and drinking. If you don't, your procedure may be canceled. If your doctor told you to take your medicines on the day of the procedure, take them with only a sip of water. · Take a bath or shower before you come in for your procedure. Do not apply lotions, perfumes, deodorants, or nail polish. · Take off all jewelry and piercings. And take out contact lenses, if you wear them. At the hospital or surgery center    · Bring a picture ID. · You will be kept comfortable and safe by your anesthesia provider. The anesthesia may make you sleep. Or it may just numb the area being worked on. · This procedure can take 2 to 6 hours. In rare cases, it can take longer. · After the procedure, pressure will be applied to the area where the catheter was put in your blood vessel. Then the area may be covered with a bandage or a compression device. This will prevent bleeding. · Nurses will check your heart rate and blood pressure. The nurse will also check the catheter site for bleeding. · If the catheter was put in your groin, you will need to lie still and keep your leg straight for several hours. The nurse may put a weighted bag on your leg to keep it still. · If the catheter was put in your arm, you may be able to sit up and get out of bed right away. But you will need to keep your arm still for at least 1 hour. · You may have a bruise or a small lump where the catheter was put in your blood vessel. This is normal and will go away. When should you call your doctor?     · You have questions or concerns. · You don't understand how to prepare for your procedure. · You become ill before the procedure (such as fever, flu, or a cold). · You need to reschedule or have changed your mind about having the procedure. Where can you learn more? Go to [email or web address]://[email or web address] org and sign in to your RF Code account. Enter X291 in the [facility] box to learn more about \"Electrophysiology Study and Catheter Ablation: Before Your Procedure. \"     If you do not have an account, please click on the \"Sign Up Now\" link. Current as of: December 16, 2019               Content Version: 12.5  © 1625-3861 Dhingana. Care instructions adapted under license by [facility] ([facility]). If you have questions about a medical condition or this instruction, always ask your healthcare professional. [place] [address] any warranty or liability for your use of this information. Patient Education        Electrophysiology Study and Catheter Ablation: What to Expect at [address] [place] [address] had an electrophysiology study for a problem with your heartbeat. You may also have had a catheter ablation to try to correct the problem. You may have swelling, bruising, or a small lump around the site where the catheters went into your body. These should go away in 3 to 4 weeks. Do not exercise hard or lift anything heavy for a week. You may be able to go back to work and to your normal routine in 1 or 2 days. This care sheet gives you a general idea about how long it will take for you to recover. But each person recovers at a different pace. Follow the steps below to get better as quickly as possible. How can you care for yourself at home? Activity  · For 1 week, do not lift anything that would make you strain. This may include heavy grocery bags and milk containers, a heavy briefcase or backpack, cat litter or dog food bags, a vacuum , or a child.   · For 1 week, do not exercise hard or do any activity that could strain your blood vessels or the site where the catheters went into your body. · Ask your doctor when it is okay to have sex. Diet  · You can eat your normal diet. If your stomach is upset, try bland, low-fat foods like plain rice, broiled chicken, toast, and yogurt. · Drink plenty of fluids (unless your doctor tells you not to). Medicines  · Your doctor will tell you if and when you can restart your medicines. He or she will also give you instructions about taking any new medicines. · If you take aspirin or some other blood thinner, ask your doctor if and when to start taking it again. Make sure that you understand exactly what your doctor wants you to do. · Ask your doctor if you can take acetaminophen (Tylenol) for pain. Do not take aspirin for 3 days, unless your doctor says it is okay. · Check with your doctor before you take aspirin or anti-inflammatory medicines to reduce pain and swelling. These include ibuprofen (Advil, Motrin) and naproxen (Aleve). · Make sure you know which heart medicines to continue and which ones to stop. Ask your doctor if you aren't sure. Catheter site care  · You can remove your bandages the day after the procedure. · You may shower 24 to 48 hours after the procedure, if your doctor okays it. Pat the incision dry. · Do not soak the catheter site until it is healed. Don't take a bath for 1 week, or until your doctor tells you it is okay. · Watch for bleeding from the site. A small amount of blood (up to the size of a quarter) on the bandage can be normal.  · If you are bleeding, lie down and press on the area for 15 minutes to try to make it stop. If the bleeding does not stop, call your doctor or seek immediate medical care. Follow-up care is a key part of your treatment and safety. Be sure to make and go to all appointments, and call your doctor if you are having problems.  It's also a good idea to know your test results and keep a list of the medicines you take. When should you call for help? Call  911 anytime you think you may need emergency care. For example, call if:  · You passed out (lost consciousness). · You have symptoms of a heart attack. These may include:  ? Chest pain or pressure, or a strange feeling in the chest.  ? Sweating. ? Shortness of breath. ? Nausea or vomiting. ? Pain, pressure, or a strange feeling in the back, neck, jaw, or upper belly, or in one or both shoulders or arms. ? Lightheadedness or sudden weakness. ? A fast or irregular heartbeat. After you call 911, the  may tell you to chew 1 adult-strength or 2 to 4 low-dose aspirin. Wait for an ambulance. Do not try to drive yourself. · You have symptoms of a stroke. These may include:  ? Sudden numbness, tingling, weakness, or loss of movement in your face, arm, or leg, especially on only one side of your body. ? Sudden vision changes. ? Sudden trouble speaking. ? Sudden confusion or trouble understanding simple statements. ? Sudden problems with walking or balance. ? A sudden, severe headache that is different from past headaches. Call your doctor now or seek immediate medical care if:  · You are bleeding from the area where the catheter was put in your blood vessel. · You have a fast-growing, painful lump at the catheter site. · You have signs of infection, such as:  ? Increased pain, swelling, warmth, or redness. ? Red streaks leading from the catheter site. ? Pus draining from the catheter site. ? A fever. · Your leg, arm, or hand is painful, looks blue, or feels cold, numb, or tingly. Watch closely for any changes in your health, and be sure to contact your doctor if you have any problems. Where can you learn more? Go to [email or web address] org and sign in to your [employer] account.  Enter [phone] in the [facility] box to learn more about \"Electrophysiology Study and Catheter Ablation: What to Expect at Home. \"     If you do not have an account, please click on the \"Sign Up Now\" link. Current as of: December 16, 2019               Content Version: 12.5  © 2872-8726 Healthwise, Incorporated. Care instructions adapted under license by [facility] ([facility]). If you have questions about a medical condition or this instruction, always ask your healthcare professional. [address] any warranty or liability for your use of this information.

## 2020-07-16 ENCOUNTER — OFFICE VISIT (OUTPATIENT)
Dept: INTERNAL MEDICINE CLINIC | Age: 60
End: 2020-07-16
Payer: COMMERCIAL

## 2020-07-16 VITALS
HEIGHT: 63 IN | WEIGHT: 149.7 LBS | HEART RATE: 94 BPM | DIASTOLIC BLOOD PRESSURE: 84 MMHG | BODY MASS INDEX: 26.52 KG/M2 | TEMPERATURE: 97.7 F | RESPIRATION RATE: 16 BRPM | OXYGEN SATURATION: 98 % | SYSTOLIC BLOOD PRESSURE: 127 MMHG

## 2020-07-16 LAB
A/G RATIO: 2.1 (ref 1.1–2.2)
ALBUMIN SERPL-MCNC: 5 G/DL (ref 3.4–5)
ALP BLD-CCNC: 98 U/L (ref 40–129)
ALT SERPL-CCNC: 27 U/L (ref 10–40)
ANION GAP SERPL CALCULATED.3IONS-SCNC: 16 MMOL/L (ref 3–16)
AST SERPL-CCNC: 14 U/L (ref 15–37)
BILIRUB SERPL-MCNC: 1 MG/DL (ref 0–1)
BUN BLDV-MCNC: 20 MG/DL (ref 7–20)
CALCIUM SERPL-MCNC: 10 MG/DL (ref 8.3–10.6)
CHLORIDE BLD-SCNC: 99 MMOL/L (ref 99–110)
CHOLESTEROL, TOTAL: 180 MG/DL (ref 0–199)
CO2: 23 MMOL/L (ref 21–32)
CREAT SERPL-MCNC: 0.7 MG/DL (ref 0.6–1.1)
CREATININE URINE: 148.2 MG/DL (ref 28–259)
GFR AFRICAN AMERICAN: >60
GFR NON-AFRICAN AMERICAN: >60
GLOBULIN: 2.4 G/DL
GLUCOSE BLD-MCNC: 134 MG/DL (ref 70–99)
HBA1C MFR BLD: 6 %
HCT VFR BLD CALC: 40.2 % (ref 36–48)
HDLC SERPL-MCNC: 62 MG/DL (ref 40–60)
HEMOGLOBIN: 13.2 G/DL (ref 12–16)
HEPATITIS C ANTIBODY INTERPRETATION: NORMAL
LDL CHOLESTEROL CALCULATED: 92 MG/DL
MCH RBC QN AUTO: 32.4 PG (ref 26–34)
MCHC RBC AUTO-ENTMCNC: 32.7 G/DL (ref 31–36)
MCV RBC AUTO: 99.2 FL (ref 80–100)
MICROALBUMIN UR-MCNC: 1.8 MG/DL
MICROALBUMIN/CREAT UR-RTO: 12.1 MG/G (ref 0–30)
PDW BLD-RTO: 13.2 % (ref 12.4–15.4)
PLATELET # BLD: 273 K/UL (ref 135–450)
PMV BLD AUTO: 9.3 FL (ref 5–10.5)
POTASSIUM SERPL-SCNC: 4.6 MMOL/L (ref 3.5–5.1)
RBC # BLD: 4.06 M/UL (ref 4–5.2)
SARS-COV-2 ANTIBODY, TOTAL: NEGATIVE
SODIUM BLD-SCNC: 138 MMOL/L (ref 136–145)
TOTAL PROTEIN: 7.4 G/DL (ref 6.4–8.2)
TRIGL SERPL-MCNC: 132 MG/DL (ref 0–150)
TSH REFLEX FT4: 0.48 UIU/ML (ref 0.27–4.2)
VLDLC SERPL CALC-MCNC: 26 MG/DL
WBC # BLD: 6.5 K/UL (ref 4–11)

## 2020-07-16 PROCEDURE — 2022F DILAT RTA XM EVC RTNOPTHY: CPT | Performed by: NURSE PRACTITIONER

## 2020-07-16 PROCEDURE — G8419 CALC BMI OUT NRM PARAM NOF/U: HCPCS | Performed by: NURSE PRACTITIONER

## 2020-07-16 PROCEDURE — 99214 OFFICE O/P EST MOD 30 MIN: CPT | Performed by: NURSE PRACTITIONER

## 2020-07-16 PROCEDURE — 3044F HG A1C LEVEL LT 7.0%: CPT | Performed by: NURSE PRACTITIONER

## 2020-07-16 PROCEDURE — 83036 HEMOGLOBIN GLYCOSYLATED A1C: CPT | Performed by: NURSE PRACTITIONER

## 2020-07-16 PROCEDURE — 1036F TOBACCO NON-USER: CPT | Performed by: NURSE PRACTITIONER

## 2020-07-16 PROCEDURE — 36415 COLL VENOUS BLD VENIPUNCTURE: CPT | Performed by: NURSE PRACTITIONER

## 2020-07-16 PROCEDURE — G8427 DOCREV CUR MEDS BY ELIG CLIN: HCPCS | Performed by: NURSE PRACTITIONER

## 2020-07-16 PROCEDURE — 3017F COLORECTAL CA SCREEN DOC REV: CPT | Performed by: NURSE PRACTITIONER

## 2020-07-16 ASSESSMENT — ENCOUNTER SYMPTOMS
VOMITING: 0
SINUS PRESSURE: 0
TROUBLE SWALLOWING: 0
EYE PAIN: 0
DIARRHEA: 0
WHEEZING: 0
RHINORRHEA: 0
ABDOMINAL PAIN: 0
SHORTNESS OF BREATH: 0
SINUS PAIN: 0
CHEST TIGHTNESS: 0
NAUSEA: 0
BACK PAIN: 0
PHOTOPHOBIA: 0
BLURRED VISION: 0
COUGH: 0
SORE THROAT: 0
ORTHOPNEA: 0
CONSTIPATION: 0
COLOR CHANGE: 0

## 2020-07-16 ASSESSMENT — PATIENT HEALTH QUESTIONNAIRE - PHQ9
2. FEELING DOWN, DEPRESSED OR HOPELESS: 1
SUM OF ALL RESPONSES TO PHQ9 QUESTIONS 1 & 2: 1
SUM OF ALL RESPONSES TO PHQ QUESTIONS 1-9: 1
SUM OF ALL RESPONSES TO PHQ QUESTIONS 1-9: 1
1. LITTLE INTEREST OR PLEASURE IN DOING THINGS: 0

## 2020-07-16 NOTE — PROGRESS NOTES
Pt is here for: Dm type II, HTN, HLD, atrial fibrillation, IBS, psoriasis       This is a 61 yr old CF, with a known past medical hx that includes HTN, HLD, DM type II, chronic atrial fibrillation, suspected IBS and psoriasis, that presents today for f/u in regards to chronic disease management. Hypertension   This is a chronic problem. The current episode started more than 1 year ago. The problem is unchanged. The problem is controlled. Pertinent negatives include no anxiety, blurred vision, chest pain, headaches, malaise/fatigue, neck pain, orthopnea, palpitations, peripheral edema, PND, shortness of breath or sweats. Risk factors for coronary artery disease include family history, dyslipidemia, diabetes mellitus, stress and sedentary lifestyle. Past treatments include angiotensin blockers, diuretics, calcium channel blockers and lifestyle changes. The current treatment provides significant improvement. Compliance problems include exercise and diet. There is no history of chronic renal disease. Hyperlipidemia   This is a chronic problem. The current episode started more than 1 year ago. The problem is controlled. Exacerbating diseases include diabetes. She has no history of chronic renal disease, hypothyroidism, liver disease, obesity or nephrotic syndrome. Factors aggravating her hyperlipidemia include fatty foods. Pertinent negatives include no chest pain, focal sensory loss, focal weakness, leg pain, myalgias or shortness of breath. Current antihyperlipidemic treatment includes exercise, diet change and statins. The current treatment provides significant improvement of lipids. Compliance problems include adherence to exercise and adherence to diet. Risk factors for coronary artery disease include dyslipidemia, diabetes mellitus, family history, hypertension, stress and a sedentary lifestyle. Diabetes   This problem is chronic and stable.   Pertinent negatives for hypoglycemia include no confusion, dizziness, headaches, hunger, mood changes, nervousness/anxiousness, pallor, seizures, sleepiness, speech difficulty, sweats or tremors. Associated symptoms include foot paresthesias. Pertinent negatives for diabetes include no blurred vision, no chest pain, no fatigue, no foot ulcerations, no polydipsia, no polyphagia, no polyuria, no visual change, no weakness and no weight loss. There are no hypoglycemic complications. Symptoms are stable. There are no diabetic complications. Risk factors for coronary artery disease include diabetes mellitus, family history and stress. She is compliant with treatment all of the time. She is following a generally healthy diet. Meal planning includes ADA exchanges. She has not had a previous visit with a dietitian. She participates in exercise weekly. Her home blood glucose trend is fluctuating minimally. An ACE inhibitor/angiotensin II receptor blocker is being taken. She does not see a podiatrist.Eye exam is not current. Chronic Atrial Fibrillation  This problem is chronic and stable. Rate controlled, currently on BB/Xarelto therapy. CHADsVAC=2. Recently saw cardiology with recs for Ablation, patient states still has to schedule. Denies SOB/CP, dizziness, or palpitations. . Will monitor   IBS  This problem is chronic and stable. Patient reports bentyl use intermittent for diarrhea episodes. Denies need for recent use. Denies associated melana/hematochezia/weight loss/gain or abdominal pain. Will monitor. Psoriasis   This problem is chronic and stable. F/w Dermatology as OP. Currently on Apremilast therapy.  Will monitor     /84 (Site: Left Upper Arm, Position: Sitting, Cuff Size: Medium Adult)   Pulse 94   Temp 97.7 °F (36.5 °C)   Resp 16   Ht 5' 3\" (1.6 m)   Wt 149 lb 11.2 oz (67.9 kg)   SpO2 98%   Breastfeeding No   BMI 26.52 kg/m²     Review of Systems   Constitutional: Negative for activity change, appetite change, chills, fatigue, fever, malaise/fatigue and unexpected weight change. HENT: Negative for congestion, mouth sores, nosebleeds, postnasal drip, rhinorrhea, sinus pressure, sinus pain, sneezing, sore throat and trouble swallowing. Eyes: Negative for blurred vision, photophobia and pain. Respiratory: Negative for cough, chest tightness, shortness of breath and wheezing. Cardiovascular: Negative for chest pain, palpitations, orthopnea, leg swelling and PND. Gastrointestinal: Negative for abdominal pain, constipation, diarrhea, nausea and vomiting. Endocrine: Negative for polydipsia, polyphagia and polyuria. Genitourinary: Negative for difficulty urinating, dysuria, frequency, hematuria and urgency. Musculoskeletal: Negative for back pain, myalgias, neck pain and neck stiffness. Skin: Negative for color change, pallor, rash and wound. Denies psoriasis flares at this time    Neurological: Negative for dizziness, tremors, focal weakness, syncope, weakness, numbness and headaches. Hematological: Negative for adenopathy. Psychiatric/Behavioral: Negative for agitation, dysphoric mood, hallucinations, self-injury, sleep disturbance and suicidal ideas. The patient is not nervous/anxious and is not hyperactive. Physical Exam  Constitutional:       General: She is not in acute distress. Appearance: Normal appearance. She is well-developed. She is not ill-appearing or toxic-appearing. HENT:      Head: Normocephalic. Right Ear: No tenderness. Tympanic membrane is not erythematous. Left Ear: No tenderness. Tympanic membrane is not erythematous. Nose: Nose normal.      Right Sinus: No maxillary sinus tenderness or frontal sinus tenderness. Left Sinus: No maxillary sinus tenderness or frontal sinus tenderness. Mouth/Throat:      Pharynx: No oropharyngeal exudate or posterior oropharyngeal erythema. Tonsils: No tonsillar abscesses. Eyes:      General:         Right eye: No discharge.

## 2020-07-17 LAB
ESTIMATED AVERAGE GLUCOSE: 108.3 MG/DL
HBA1C MFR BLD: 5.4 %
HIV AG/AB: NORMAL
HIV ANTIGEN: NORMAL
HIV-1 ANTIBODY: NORMAL
HIV-2 AB: NORMAL

## 2020-09-14 ENCOUNTER — NURSE ONLY (OUTPATIENT)
Dept: INTERNAL MEDICINE CLINIC | Age: 60
End: 2020-09-14
Payer: COMMERCIAL

## 2020-09-14 PROCEDURE — 90472 IMMUNIZATION ADMIN EACH ADD: CPT | Performed by: INTERNAL MEDICINE

## 2020-09-14 PROCEDURE — 90686 IIV4 VACC NO PRSV 0.5 ML IM: CPT | Performed by: INTERNAL MEDICINE

## 2020-09-14 PROCEDURE — 90471 IMMUNIZATION ADMIN: CPT | Performed by: INTERNAL MEDICINE

## 2020-09-14 PROCEDURE — 90750 HZV VACC RECOMBINANT IM: CPT | Performed by: INTERNAL MEDICINE

## 2020-09-23 ENCOUNTER — TELEPHONE (OUTPATIENT)
Dept: INTERNAL MEDICINE CLINIC | Age: 60
End: 2020-09-23

## 2020-09-23 NOTE — TELEPHONE ENCOUNTER
xarelto last filled 6/26/20                                                   Last ov 7/16/20                                                                   Next ov 9/24/20

## 2020-09-24 ENCOUNTER — OFFICE VISIT (OUTPATIENT)
Dept: INTERNAL MEDICINE CLINIC | Age: 60
End: 2020-09-24
Payer: COMMERCIAL

## 2020-09-24 VITALS
HEART RATE: 75 BPM | DIASTOLIC BLOOD PRESSURE: 84 MMHG | WEIGHT: 150.4 LBS | OXYGEN SATURATION: 98 % | HEIGHT: 63 IN | TEMPERATURE: 97.7 F | SYSTOLIC BLOOD PRESSURE: 123 MMHG | BODY MASS INDEX: 26.65 KG/M2 | RESPIRATION RATE: 16 BRPM

## 2020-09-24 PROCEDURE — 1036F TOBACCO NON-USER: CPT | Performed by: INTERNAL MEDICINE

## 2020-09-24 PROCEDURE — 3017F COLORECTAL CA SCREEN DOC REV: CPT | Performed by: INTERNAL MEDICINE

## 2020-09-24 PROCEDURE — 99214 OFFICE O/P EST MOD 30 MIN: CPT | Performed by: INTERNAL MEDICINE

## 2020-09-24 PROCEDURE — G8427 DOCREV CUR MEDS BY ELIG CLIN: HCPCS | Performed by: INTERNAL MEDICINE

## 2020-09-24 PROCEDURE — G8419 CALC BMI OUT NRM PARAM NOF/U: HCPCS | Performed by: INTERNAL MEDICINE

## 2020-09-24 ASSESSMENT — ENCOUNTER SYMPTOMS
BLOOD IN STOOL: 1
WHEEZING: 0
DIARRHEA: 1
CONSTIPATION: 0
PHOTOPHOBIA: 0
ABDOMINAL DISTENTION: 0
SINUS PAIN: 0
RHINORRHEA: 0
SHORTNESS OF BREATH: 0
CHEST TIGHTNESS: 0
COUGH: 0
ABDOMINAL PAIN: 0
NAUSEA: 0
EYE DISCHARGE: 0
EYE PAIN: 0
BACK PAIN: 0
VOMITING: 0

## 2020-09-24 NOTE — PROGRESS NOTES
2020    Mauri Phalen (:  1960) is a 61 y.o. female, here for evaluation of the following medical concerns:    Chief Complaint   Patient presents with    Annual Exam    Other    Established New Doctor       HPI   Prior labs and medical records reviewed  The pt is a 61YOF with PMH of Dm2, HTN, Emmy Jacobsons who presents today for follow up   The pt is doing ok overall except having diarrhea which seems to bother her quite a bit. She is complaint with her all of her medications. She has a appointment for her pap's. She thinks her colonoscopy was done recently within the lat 1-2 yrs. I cannot find the record for that. It was don in  and was supposed to be repeated in 5 yrs  She used to work for [employer] but has been on the leave of absence. No smoking, occasional ETOH. She does not exercise on a regular basis. Lives at home with her  and child. Diarrhea has been there for several years but it has been worsening now. She gets about 8-10 BM's a day on a bad day sometimes it is less. No abdominal pian or cramping, no N/V. She thinks Apremilast might has made it worse, One time she laso noted blood in the stool. She has urgency and it is difficult for her to hold it. She does not think it is related to her food. She does go at night sometimes as well. Review of Systems   Constitutional: Negative for activity change, appetite change, chills, fatigue, fever and unexpected weight change. HENT: Negative for congestion, ear discharge, ear pain, mouth sores, nosebleeds, rhinorrhea, sinus pain and sneezing. Eyes: Negative for photophobia, pain, discharge and visual disturbance. Respiratory: Negative for cough, chest tightness, shortness of breath and wheezing. Cardiovascular: Negative for chest pain, palpitations and leg swelling. Gastrointestinal: Positive for blood in stool and diarrhea.  Negative for abdominal distention, abdominal pain, constipation, nausea and Procedure Laterality Date    BREAST REDUCTION SURGERY  2017    JOINT REPLACEMENT Right 12/28/2020    LUMBAR DISC SURGERY      ROTATOR CUFF REPAIR Right December 2014    Dr[facility] Del[facility] at [facility]       Social History     Tobacco Use    Smoking status: Never Smoker    Smokeless tobacco: Never Used   Substance Use Topics    Alcohol use: Yes     Comment: rare    Drug use: No         Family History   Problem Relation Age of Onset    Other Mother     Heart Disease Mother     Heart Disease Father     Atrial Fibrillation Father     Cancer Sister        Vitals:    09/24/20 0939   BP: 123/84   Site: Left Upper Arm   Position: Sitting   Cuff Size: Medium Adult   Pulse: 75   Resp: 16   Temp: 97.7 °F (36.5 °C)   SpO2: 98%   Weight: 150 lb 6.4 oz (68.2 kg)   Height: 5' 3\" (1.6 m)       Estimated body mass index is 26.64 kg/m² as calculated from the following:    Height as of this encounter: 5' 3\" (1.6 m). Weight as of this encounter: 150 lb 6.4 oz (68.2 kg). Physical Exam  Vitals signs and nursing note reviewed. Constitutional:       Appearance: Normal appearance. She is normal weight. HENT:      Head: Normocephalic and atraumatic. Right Ear: Tympanic membrane, ear canal and external ear normal. There is no impacted cerumen. Left Ear: Tympanic membrane and ear canal normal.      Ears:      Comments: Cerumen in the left ear     Nose: Nose normal.      Mouth/Throat:      Mouth: Mucous membranes are moist.   Eyes:      Extraocular Movements: Extraocular movements intact. Pupils: Pupils are equal, round, and reactive to light. Neck:      Musculoskeletal: Normal range of motion and neck supple. Cardiovascular:      Rate and Rhythm: Normal rate. Rhythm irregular. Pulses: Normal pulses. Heart sounds: Normal heart sounds. No murmur. Pulmonary:      Effort: Pulmonary effort is normal. No respiratory distress. Breath sounds: Normal breath sounds. No wheezing. Abdominal:      General: Bowel sounds are normal.      Palpations: Abdomen is soft. There is no mass. Tenderness: There is no abdominal tenderness. There is no guarding. Musculoskeletal: Normal range of motion. General: No swelling, tenderness or deformity. Skin:     General: Skin is warm and dry. Coloration: Skin is not jaundiced or pale. Findings: No rash. Neurological:      General: No focal deficit present. Mental Status: She is alert and oriented to person, place, and time. Sensory: No sensory deficit. Motor: No weakness. Psychiatric:         Mood and Affect: Mood normal.         Behavior: Behavior normal.         ASSESSMENT/PLAN:  1. Annual physical exam  All the bas reviewed done recently no need to repeat at this time  2. Screening for breast cancer  - KYLEIGH DIGITAL SCREENING AUGMENTED BILATERAL; Future    3. Diarrhea, unspecified type    - CALPROTECTIN STOOL; Future  - OVA & PARASITE ID/COUNT #1; Future  - GI Bacterial Pathogens By PCR; Future  - [facility] - Sheba Malik MD, Gastroenterology, [address] TOXIN/ANTIGEN; Future      No orders of the defined types were placed in this encounter. Return in about 4 weeks (around 10/22/2020) for Diabetic visit.

## 2020-09-25 RX ORDER — GLYBURIDE-METFORMIN HYDROCHLORIDE 5; 500 MG/1; MG/1
TABLET ORAL
Qty: 120 TABLET | Refills: 3 | Status: SHIPPED | OUTPATIENT
Start: 2020-09-25 | End: 2021-07-22 | Stop reason: SDUPTHER

## 2020-09-25 RX ORDER — METOPROLOL TARTRATE 50 MG/1
50 TABLET, FILM COATED ORAL 2 TIMES DAILY
Qty: 120 TABLET | Refills: 5 | Status: SHIPPED | OUTPATIENT
Start: 2020-09-25 | End: 2021-10-04 | Stop reason: SDUPTHER

## 2020-09-25 RX ORDER — VALSARTAN AND HYDROCHLOROTHIAZIDE 160; 12.5 MG/1; MG/1
1 TABLET, FILM COATED ORAL DAILY
Qty: 90 TABLET | Refills: 2 | Status: SHIPPED | OUTPATIENT
Start: 2020-09-25 | End: 2021-07-22

## 2020-09-25 RX ORDER — ATORVASTATIN CALCIUM 40 MG/1
TABLET, FILM COATED ORAL
Qty: 90 TABLET | Refills: 3 | Status: SHIPPED | OUTPATIENT
Start: 2020-09-25 | End: 2021-11-29 | Stop reason: SDUPTHER

## 2020-09-25 NOTE — TELEPHONE ENCOUNTER
----- Message from Luma Reyes sent at 9/25/2020 10:09 AM EDT -----  Subject: Refill Request    QUESTIONS  Name of Medication? rivaroxaban (XARELTO) 20 MG TABS tablet  Patient-reported dosage and instructions? once per day  How many days do you have left? 30  Preferred Pharmacy? [facility] [address]  Pharmacy phone number (if available)? [phone]  Additional Information for Provider?   ---------------------------------------------------------------------------  --------------  CALL BACK INFO  What is the best way for the office to contact you? OK to leave message on   voicemail  Preferred Call Back Phone Number?  [phone]

## 2020-09-25 NOTE — TELEPHONE ENCOUNTER
----- Message from Gisellfaraz Suad sent at 9/25/2020 10:03 AM EDT -----  Subject: Message to Provider    QUESTIONS  Information for Provider? completed forms for patient during 9/24 and a   field was left blank on the form; patient needs \"UPIN\" number  ---------------------------------------------------------------------------  --------------  [address]  What is the best way for the office to contact you? OK to leave message on   voicemail  Preferred Call Back Phone Number? [phone]  ---------------------------------------------------------------------------  --------------  SCRIPT ANSWERS  Relationship to Patient?  Self

## 2020-09-29 ENCOUNTER — TELEPHONE (OUTPATIENT)
Dept: INTERNAL MEDICINE CLINIC | Age: 60
End: 2020-09-29

## 2020-09-29 DIAGNOSIS — R19.7 DIARRHEA, UNSPECIFIED TYPE: ICD-10-CM

## 2020-09-29 LAB
REASON FOR REJECTION: NORMAL
REJECTED TEST: NORMAL

## 2020-09-30 ENCOUNTER — TELEPHONE (OUTPATIENT)
Dept: INTERNAL MEDICINE CLINIC | Age: 60
End: 2020-09-30

## 2020-09-30 NOTE — TELEPHONE ENCOUNTER
She was complaining of diarrhea.  If she gave formed stool for the sample they will not do a C diff   I don't think we need it if the stool is formed

## 2020-10-01 LAB — CALPROTECTIN, FECAL: 28 UG/G

## 2020-10-01 NOTE — TELEPHONE ENCOUNTER
I called left a message for the patient to call the office. I need her to explain why she needs the UPIN # when the test wasn't performed?

## 2020-10-03 LAB
CAMPYLOBACTER JEJUNI/COLI PCR: NOT DETECTED
CAMPYLOBACTER UPSALIENSIS: NOT DETECTED
E COLI SHIGELLA/ENTEROINVASIVE PCR: NOT DETECTED
SALMONELLA PCR: NOT DETECTED
SHIGA TOXIN I: NOT DETECTED
SHIGA TOXIN II: NOT DETECTED

## 2020-10-05 LAB — INTERPRETATION: NEGATIVE

## 2020-10-05 NOTE — TELEPHONE ENCOUNTER
Pt says she needs the UPIN# because its a form that she needs to send to her insurance copy states that's DR. Honey Vegas already signed it but [employer] company needs it to verify that Dr. Honey Vegas is who she say she is. Pt states she has done this before with all of her other doctors. If you look in her chart you can see her previous form.

## 2020-10-19 ENCOUNTER — OFFICE VISIT (OUTPATIENT)
Dept: PRIMARY CARE CLINIC | Age: 60
End: 2020-10-19
Payer: COMMERCIAL

## 2020-10-19 PROCEDURE — G8419 CALC BMI OUT NRM PARAM NOF/U: HCPCS | Performed by: NURSE PRACTITIONER

## 2020-10-19 PROCEDURE — 99211 OFF/OP EST MAY X REQ PHY/QHP: CPT | Performed by: NURSE PRACTITIONER

## 2020-10-19 PROCEDURE — G8428 CUR MEDS NOT DOCUMENT: HCPCS | Performed by: NURSE PRACTITIONER

## 2020-10-20 LAB — SARS-COV-2, PCR: NOT DETECTED

## 2020-10-20 RX ORDER — DICYCLOMINE HCL 20 MG
20 TABLET ORAL 4 TIMES DAILY PRN
COMMUNITY

## 2020-10-20 NOTE — RESULT ENCOUNTER NOTE

## 2020-10-20 NOTE — FLOWSHEET NOTE
[address] [facility] time___10/23/2020 1200_________        Surgery time_________1300___    Take the following medications with a sip of water:    Do not eat or drink anything after 12:00 midnight prior to your surgery. This includes water chewing gum, mints and ice chips. You may brush your teeth and gargle the morning of your surgery, but do not swallow the water     Please see your family doctor/pediatrician for a history and physical and/or concerning medications. Bring any test results/reports from your physicians office. If you are under the care of a heart doctor or specialist doctor, please be aware that you may be asked to them for clearance    You may be asked to stop blood thinners such as Coumadin, Plavix, Fragmin, Lovenox, etc., or any anti-inflammatories such as:  Aspirin, Ibuprofen, Advil, Naproxen prior to your surgery. We also ask that you stop any OTC medications such as fish oil, vitamin E, glucosamine, garlic, Multivitamins, COQ 10, etc.    We ask that you do not smoke 24 hours prior to surgery  We ask that you do not  drink any alcoholic beverages 24 hours prior to surgery     You must make arrangements for a responsible adult to take you home after your surgery. For your safety you will not be allowed to leave alone or drive yourself home. Your surgery will be cancelled if you do not have a ride home. Also for your safety, it is strongly suggested that someone stay with you the first 24 hours after your surgery. A parent or legal guardian must accompany a child scheduled for surgery and plan to stay at the hospital until the child is discharged. Please do not bring other children with you. For your comfort, please wear simple loose fitting clothing to the hospital.  Please do not bring valuables.     Do not wear any make-up or nail polish on your fingers or toes      For your safety, please do not wear any jewelry or body piercing's on the day of surgery. All jewelry must be removed. If you have dentures, they will be removed before going to operating room. For your convenience, we will provide you with a container. If you wear contact lenses or glasses, they will be removed, please bring a case for them. If you have a living will and a durable power of  for healthcare, please bring in a copy. As part of our patient safety program to minimize surgical site infections, we ask you to do the following:    · Please notify your surgeon if you develop any illness between         now and the  day of your surgery. · This includes a cough, cold, fever, sore throat, nausea,         or vomiting, and diarrhea, etc.  ·  Please notify your surgeon if you experience dizziness, shortness         of breath or blurred vision between now and the time of your surgery. Do not shave your operative site 96 hours prior to surgery. For face and neck surgery, men may use an electric razor 48 hours   prior to surgery. You may shower the night before surgery or the morning of   your surgery with an antibacterial soap. You will need to bring a photo ID and insurance card    [facility] has an onsite pharmacy, would you like to utilize our pharmacy     If you will be staying overnight and use a C-pap machine, please bring   your C-pap to hospital     Our goal is to provide you with excellent care, therefore, visitors will be limited to two(2) in the room at a time so that we may focus on providing this care for you. Please contact pre-admission testing if you have any further questions. [facility] phone number:  [phone]  Please note these are generalized instructions for all surgical cases, you may be provided with more specific instructions according to your surgery.

## 2020-10-22 ENCOUNTER — ANESTHESIA EVENT (OUTPATIENT)
Dept: ENDOSCOPY | Age: 60
End: 2020-10-22
Payer: COMMERCIAL

## 2020-10-23 ENCOUNTER — HOSPITAL ENCOUNTER (OUTPATIENT)
Age: 60
Setting detail: OUTPATIENT SURGERY
Discharge: HOME | End: 2020-10-23
Attending: INTERNAL MEDICINE | Admitting: INTERNAL MEDICINE
Payer: COMMERCIAL

## 2020-10-23 ENCOUNTER — ANESTHESIA (OUTPATIENT)
Dept: ENDOSCOPY | Age: 60
End: 2020-10-23
Payer: COMMERCIAL

## 2020-10-23 VITALS
HEIGHT: 63 IN | DIASTOLIC BLOOD PRESSURE: 63 MMHG | RESPIRATION RATE: 16 BRPM | HEART RATE: 81 BPM | OXYGEN SATURATION: 98 % | SYSTOLIC BLOOD PRESSURE: 115 MMHG | TEMPERATURE: 97 F | WEIGHT: 151.25 LBS | BODY MASS INDEX: 26.8 KG/M2

## 2020-10-23 VITALS — SYSTOLIC BLOOD PRESSURE: 160 MMHG | OXYGEN SATURATION: 98 % | DIASTOLIC BLOOD PRESSURE: 92 MMHG

## 2020-10-23 LAB
GLUCOSE BLD-MCNC: 173 MG/DL (ref 70–99)
PERFORMED ON: ABNORMAL

## 2020-10-23 PROCEDURE — 2709999900 HC NON-CHARGEABLE SUPPLY: Performed by: INTERNAL MEDICINE

## 2020-10-23 PROCEDURE — 6360000002 HC RX W HCPCS: Performed by: NURSE ANESTHETIST, CERTIFIED REGISTERED

## 2020-10-23 PROCEDURE — 2500000003 HC RX 250 WO HCPCS: Performed by: NURSE ANESTHETIST, CERTIFIED REGISTERED

## 2020-10-23 PROCEDURE — 3700000000 HC ANESTHESIA ATTENDED CARE: Performed by: INTERNAL MEDICINE

## 2020-10-23 PROCEDURE — 2580000003 HC RX 258: Performed by: ANESTHESIOLOGY

## 2020-10-23 PROCEDURE — 7100000010 HC PHASE II RECOVERY - FIRST 15 MIN: Performed by: INTERNAL MEDICINE

## 2020-10-23 PROCEDURE — 7100000011 HC PHASE II RECOVERY - ADDTL 15 MIN: Performed by: INTERNAL MEDICINE

## 2020-10-23 PROCEDURE — 88305 TISSUE EXAM BY PATHOLOGIST: CPT

## 2020-10-23 PROCEDURE — 3700000001 HC ADD 15 MINUTES (ANESTHESIA): Performed by: INTERNAL MEDICINE

## 2020-10-23 PROCEDURE — 3609010300 HC COLONOSCOPY W/BIOPSY SINGLE/MULTIPLE: Performed by: INTERNAL MEDICINE

## 2020-10-23 PROCEDURE — 2580000003 HC RX 258: Performed by: NURSE ANESTHETIST, CERTIFIED REGISTERED

## 2020-10-23 RX ORDER — SODIUM CHLORIDE 9 MG/ML
INJECTION, SOLUTION INTRAVENOUS CONTINUOUS PRN
Status: DISCONTINUED | OUTPATIENT
Start: 2020-10-23 | End: 2020-10-23 | Stop reason: SDUPTHER

## 2020-10-23 RX ORDER — SODIUM CHLORIDE 0.9 % (FLUSH) 0.9 %
10 SYRINGE (ML) INJECTION PRN
Status: DISCONTINUED | OUTPATIENT
Start: 2020-10-23 | End: 2020-10-23 | Stop reason: HOSPADM

## 2020-10-23 RX ORDER — SODIUM CHLORIDE 0.9 % (FLUSH) 0.9 %
10 SYRINGE (ML) INJECTION EVERY 12 HOURS SCHEDULED
Status: DISCONTINUED | OUTPATIENT
Start: 2020-10-23 | End: 2020-10-23 | Stop reason: HOSPADM

## 2020-10-23 RX ORDER — LIDOCAINE HYDROCHLORIDE 20 MG/ML
INJECTION, SOLUTION EPIDURAL; INFILTRATION; INTRACAUDAL; PERINEURAL PRN
Status: DISCONTINUED | OUTPATIENT
Start: 2020-10-23 | End: 2020-10-23 | Stop reason: SDUPTHER

## 2020-10-23 RX ORDER — SODIUM CHLORIDE 9 MG/ML
INJECTION, SOLUTION INTRAVENOUS CONTINUOUS
Status: DISCONTINUED | OUTPATIENT
Start: 2020-10-23 | End: 2020-10-23 | Stop reason: HOSPADM

## 2020-10-23 RX ORDER — PROPOFOL 10 MG/ML
INJECTION, EMULSION INTRAVENOUS PRN
Status: DISCONTINUED | OUTPATIENT
Start: 2020-10-23 | End: 2020-10-23 | Stop reason: SDUPTHER

## 2020-10-23 RX ADMIN — SODIUM CHLORIDE: 9 INJECTION, SOLUTION INTRAVENOUS at 12:58

## 2020-10-23 RX ADMIN — SODIUM CHLORIDE: 9 INJECTION, SOLUTION INTRAVENOUS at 12:30

## 2020-10-23 RX ADMIN — LIDOCAINE HYDROCHLORIDE 40 MG: 20 INJECTION, SOLUTION EPIDURAL; INFILTRATION; INTRACAUDAL; PERINEURAL at 13:09

## 2020-10-23 RX ADMIN — PROPOFOL 50 MG: 10 INJECTION, EMULSION INTRAVENOUS at 13:15

## 2020-10-23 RX ADMIN — PROPOFOL 50 MG: 10 INJECTION, EMULSION INTRAVENOUS at 13:18

## 2020-10-23 RX ADMIN — PROPOFOL 50 MG: 10 INJECTION, EMULSION INTRAVENOUS at 13:22

## 2020-10-23 RX ADMIN — PROPOFOL 100 MG: 10 INJECTION, EMULSION INTRAVENOUS at 13:09

## 2020-10-23 RX ADMIN — PROPOFOL 50 MG: 10 INJECTION, EMULSION INTRAVENOUS at 13:12

## 2020-10-23 ASSESSMENT — PAIN - FUNCTIONAL ASSESSMENT
PAIN_FUNCTIONAL_ASSESSMENT: 0-10
PAIN_FUNCTIONAL_ASSESSMENT: ACTIVITIES ARE NOT PREVENTED

## 2020-10-23 ASSESSMENT — PAIN DESCRIPTION - ORIENTATION
ORIENTATION: MID

## 2020-10-23 ASSESSMENT — PAIN DESCRIPTION - DESCRIPTORS
DESCRIPTORS: OTHER (COMMENT)

## 2020-10-23 ASSESSMENT — PAIN DESCRIPTION - LOCATION
LOCATION: ABDOMEN

## 2020-10-23 ASSESSMENT — LIFESTYLE VARIABLES: SMOKING_STATUS: 0

## 2020-10-23 ASSESSMENT — PAIN DESCRIPTION - PROGRESSION
CLINICAL_PROGRESSION: GRADUALLY IMPROVING
CLINICAL_PROGRESSION: NOT CHANGED
CLINICAL_PROGRESSION: GRADUALLY IMPROVING

## 2020-10-23 ASSESSMENT — PAIN SCALES - GENERAL
PAINLEVEL_OUTOF10: 3
PAINLEVEL_OUTOF10: 2
PAINLEVEL_OUTOF10: 1

## 2020-10-23 ASSESSMENT — PAIN DESCRIPTION - PAIN TYPE
TYPE: ACUTE PAIN

## 2020-10-23 ASSESSMENT — PAIN DESCRIPTION - FREQUENCY
FREQUENCY: CONTINUOUS

## 2020-10-23 ASSESSMENT — PAIN DESCRIPTION - ONSET
ONSET: ON-GOING

## 2020-10-23 NOTE — ANESTHESIA PRE PROCEDURE
[facility] Department of Anesthesiology  Pre-Anesthesia Evaluation/Consultation       Name:  Rodrigo Martines  : 1960  Age:  61 y.o. MRN:  [id number]  Date: 10/23/2020           Surgeon: Surgeon(s):  Dayday Haas MD    Procedure: Procedure(s):  COLONOSCOPY DIAGNOSTIC     Allergies   Allergen Reactions    Codeine      Patient Active Problem List   Diagnosis    Type 2 diabetes mellitus without complication ([facility]    Mixed hyperlipidemia    Essential hypertension    Atrial fibrillation, controlled ([facility]    Abnormal levels of other serum enzymes    Encounter for long-term (current) use of other medications    Genital herpes simplex    Macromastia    Psoriasis vulgaris    Pure hypercholesterolemia    Status post Mohs surgery for basal cell carcinoma    Type II or unspecified type diabetes mellitus without mention of complication, not stated as uncontrolled     Past Medical History:   Diagnosis Date    Atrial fibrillation, controlled ([facility] [address] 3/27/2013    Essential hypertension 2011    Mixed hyperlipidemia 2011    Psoriasis     Type II or unspecified type diabetes mellitus without mention of complication, not stated as uncontrolled      Past Surgical History:   Procedure Laterality Date    BREAST REDUCTION SURGERY  2017    JOINT REPLACEMENT Right 2020    LUMBAR DISC SURGERY      ROTATOR CUFF REPAIR Right 2014    Dr. Kristian Barbosa at [facility]     Social History     Tobacco Use    Smoking status: Never Smoker    Smokeless tobacco: Never Used   Substance Use Topics    Alcohol use: Yes     Comment: rare    Drug use: No     Medications  No current facility-administered medications on file prior to encounter.       Current Outpatient Medications on File Prior to Encounter   Medication Sig Dispense Refill    dicyclomine (BENTYL) 20 MG tablet Take 20 mg by mouth 4 times daily as needed      rivaroxaban (Jenny Finical) 20 MG TABS tablet TAKE ONE TABLET BY MOUTH DAILY (Patient taking differently: Indications: hold for 24 hours precolonoscopy on 10/23/2020 TAKE ONE TABLET BY MOUTH DAILY) 90 tablet 3    atorvastatin (LIPITOR) 40 MG tablet Take 1 tablet by mouth daily 90 tablet 3    glyBURIDE-metFORMIN (GLUCOVANCE) 5-500 MG per tablet Take 1 tablet by mouth twice a day 120 tablet 3    metoprolol tartrate (LOPRESSOR) 50 MG tablet Take 1 tablet by mouth 2 times daily 120 tablet 5    valsartan-hydrochlorothiazide (DIOVAN-HCT) 160-12.5 MG per tablet Take 1 tablet by mouth daily 90 tablet 2    Apremilast 30 MG TABS Take 30 mg by mouth daily        Current Facility-Administered Medications   Medication Dose Route Frequency Provider Last Rate Last Dose    0.9 % sodium chloride infusion   Intravenous Continuous Leno Garrison MD        sodium chloride flush 0.9 % injection 10 mL  10 mL Intravenous 2 times per day Leno Garrison MD        sodium chloride flush 0.9 % injection 10 mL  10 mL Intravenous PRN Leno Garrison MD         Vital Signs (Current)   Vitals:    10/20/20 0849 10/23/20 1223   BP:  130/88   Pulse:  81   Resp:  16   Temp:  96.6 °F (35.9 °C)   TempSrc:  Temporal   SpO2:  98%   Weight: 152 lb (68.9 kg) 151 lb 4 oz (68.6 kg)   Height: 5' 3\" (1.6 m)                                           BP Readings from Last 3 Encounters:   10/23/20 130/88   20 123/84   20 127/84     Vital Signs Statistics (for past 48 hrs)     Temp  Av.6 °F (35.9 °C)  Min: 96.6 °F (35.9 °C)   Min taken time: 10/23/20 1223  Max: 96.6 °F (35.9 °C)   Max taken time: 10/23/20 1223  Pulse  Av  Min: 81   Min taken time: 10/23/20 1223  Max: 81   Max taken time: 10/23/20 1223  Resp  Av  Min: 16   Min taken time: 10/23/20 1223  Max: 16   Max taken time: 10/23/20 1223  BP  Min: 130/88   Min taken time: 10/23/20 1223  Max: 130/88   Max taken time: 10/23/20 1223  SpO2  Av %  Min: 98 %   Min taken time: 10/23/20 1223  Max: 98 %   Max taken time: 10/23/20 1223  BP Readings from Last 3 Encounters:   10/23/20 130/88   09/24/20 123/84   07/16/20 127/84       BMI  Body mass index is 26.79 kg/m². Estimated body mass index is 26.79 kg/m² as calculated from the following:    Height as of this encounter: 5' 3\" (1.6 m). Weight as of this encounter: 151 lb 4 oz (68.6 kg). CBC   Lab Results   Component Value Date    WBC 6.5 07/16/2020    RBC 4.06 07/16/2020    HGB 13.2 07/16/2020    HCT 40.2 07/16/2020    MCV 99.2 07/16/2020    RDW 13.2 07/16/2020     07/16/2020     CMP    Lab Results   Component Value Date     07/16/2020    K 4.6 07/16/2020    CL 99 07/16/2020    CO2 23 07/16/2020    BUN 20 07/16/2020    CREATININE 0.7 07/16/2020    GFRAA >60 07/16/2020    GFRAA >60 10/23/2012    AGRATIO 2.1 07/16/2020    LABGLOM >60 07/16/2020    GLUCOSE 134 07/16/2020    PROT 7.4 07/16/2020    PROT 7.7 10/23/2012    CALCIUM 10.0 07/16/2020    BILITOT 1.0 07/16/2020    ALKPHOS 98 07/16/2020    AST 14 07/16/2020    ALT 27 07/16/2020     BMP    Lab Results   Component Value Date     07/16/2020    K 4.6 07/16/2020    CL 99 07/16/2020    CO2 23 07/16/2020    BUN 20 07/16/2020    CREATININE 0.7 07/16/2020    CALCIUM 10.0 07/16/2020    GFRAA >60 07/16/2020    GFRAA >60 10/23/2012    LABGLOM >60 07/16/2020    GLUCOSE 134 07/16/2020     POCGlucose  No results for input(s): GLUCOSE in the last 72 hours.    Coags    Lab Results   Component Value Date    PROTIME 16.2 12/20/2019    INR 1.39 12/20/2019    APTT 39.7 02/29/0009     HCG (If Applicable) No results found for: PREGTESTUR, PREGSERUM, HCG, HCGQUANT   ABGs No results found for: PHART, PO2ART, GTL1MHZ, GRX1VPC, BEART, P5FDDPCF   Type & Screen (If Applicable)  No results found for: LABABO, LABRH                         BMI: Wt Readings from Last 3 Encounters:       NPO Status:  >8h                          Anesthesia Evaluation  Patient summary reviewed no history of anesthetic complications:   Airway: Mallampati: II  TM distance: >3 FB   Neck ROM: full  Mouth opening: > = 3 FB Dental: normal exam         Pulmonary: breath sounds clear to auscultation      (-) COPD, asthma, sleep apnea and not a current smoker                           Cardiovascular:  Exercise tolerance: good (>4 METS),   (+) hypertension:, dysrhythmias: atrial fibrillation, hyperlipidemia    (-) past MI and  angina      Rhythm: regular  Rate: normal                    Neuro/Psych:      (-) seizures, TIA and CVA           GI/Hepatic/Renal:        (-) GERD, hepatitis, liver disease, no renal disease and no morbid obesity       Endo/Other:    (+) Diabetes, .    (-) hypothyroidism               Abdominal:           Vascular:     - DVT and PE. Anesthesia Plan      MAC     ASA 3       Induction: intravenous. Anesthetic plan and risks discussed with patient. Plan discussed with CRNA. This pre-anesthesia assessment may be used as a history and physical.    DOS STAFF ADDENDUM:    Pt seen and examined, chart reviewed (including anesthesia, drug and allergy history). No interval changes to history and physical examination. Anesthetic plan, risks, benefits, alternatives, and personnel involved discussed with patient. Patient verbalized an understanding and agrees to proceed.       Ángel Hodges MD  October 23, 2020  12:26 PM

## 2020-10-23 NOTE — BRIEF OP NOTE
Brief Postoperative Note    Argenis Johnston  YOB: 1960  [id number]      Pre-operative Diagnosis: Diarrhea, rectal bleeding, family history of colon cancer    Previous Colonoscopy: Yes  When: 12/14/15  Greater than 3 years? Yes      Post-operative Diagnosis: Same    Procedure: Colonoscopy    The preparation was good.     Anesthesia: [facility]    Surgeons/Assistants: Haseeb Varner MD    Estimated Blood Loss: None    Complications: None    Specimens: Was Obtained: Random colon; polyp    Findings: See dictated report    Electronically signed by Haseeb Varner MD on 7/10/2017 at 7:45 AM

## 2020-10-23 NOTE — H&P
[facility]   Pre-operative History and Physical    Patient: Marilia Das  : 1960  Acct#:         HISTORY OF PRESENT ILLNESS:    The patient is a 61 y.o. female  who presents with DIARRHEA, RECTAL BLEEDING, FAMILY HISTORY OF COLON CANCER  Past Medical History:        Diagnosis Date    Atrial fibrillation, controlled ([address] 3/27/2013    Essential hypertension 2011    Mixed hyperlipidemia 2011    Psoriasis     Type II or unspecified type diabetes mellitus without mention of complication, not stated as uncontrolled      Past Surgical History:        Procedure Laterality Date    BREAST REDUCTION SURGERY  2017    JOINT REPLACEMENT Right 2020    LUMBAR [facility] [address] SURGERY      ROTATOR CUFF REPAIR Right 2014    Dr. Libby Anne at [facility] surgical center     Medications prior to admission:   Prior to Admission medications    Medication Sig Start Date End Date Taking?  Authorizing Provider   dicyclomine (BENTYL) 20 MG tablet Take 20 mg by mouth 4 times daily as needed   Yes Historical Provider, MD   rivaroxaban (XARELTO) 20 MG TABS tablet TAKE ONE TABLET BY MOUTH DAILY  Patient taking differently: Indications: hold for 24 hours precolonoscopy on 10/23/2020 TAKE ONE TABLET BY MOUTH DAILY 20  Yes Laureano Linn MD   atorvastatin (LIPITOR) 40 MG tablet Take 1 tablet by mouth daily 20  Yes Laureano Linn MD   glyBURIDE-metFORMIN (GLUCOVANCE) 5-500 MG per tablet Take 1 tablet by mouth twice a day 20  Yes Laureano Linn MD   metoprolol tartrate (LOPRESSOR) 50 MG tablet Take 1 tablet by mouth 2 times daily 20  Yes Laureano Linn MD   valsartan-hydrochlorothiazide (DIOVAN-HCT) 160-12.5 MG per tablet Take 1 tablet by mouth daily 20  Yes Laureano Linn MD   Apremilast 30 MG TABS Take 30 mg by mouth daily  18  Yes Historical Provider, MD     Allergies:    Codeine  Social History:   Social History     Socioeconomic History    Marital status:      Spouse name: Not on file    Number of children: Not on file    Years of education: Not on file    Highest education level: Not on file   Occupational History    Occupation: Customer service    Social Needs    Financial resource strain: Not on file    Food insecurity     Worry: Not on file     Inability: Not on file    Transportation needs     Medical: Not on file     Non-medical: Not on file   Tobacco Use    Smoking status: Never Smoker    Smokeless tobacco: Never Used   Substance and Sexual Activity    Alcohol use: Yes     Comment: rare    Drug use: No    Sexual activity: Yes   Lifestyle    Physical activity     Days per week: Not on file     Minutes per session: Not on file    Stress: Not on file   Relationships    Social connections     Talks on phone: Not on file     Gets together: Not on file     Attends Bahai service: Not on file     Active member of club or organization: Not on file     Attends meetings of clubs or organizations: Not on file     Relationship status: Not on file    Intimate partner violence     Fear of current or ex partner: Not on file     Emotionally abused: Not on file     Physically abused: Not on file     Forced sexual activity: Not on file   Other Topics Concern    Not on file   Social History Narrative    Not on file           Family History:   Family History   Problem Relation Age of Onset    Other Mother     Heart Disease Mother     Heart Disease Father     Atrial Fibrillation Father     Cancer Sister          PHYSICAL EXAM:      /88   Pulse 81   Temp 96.6 °F (35.9 °C) (Temporal)   Resp 16   Ht 5' 3\" (1.6 m)   Wt 151 lb 4 oz (68.6 kg)   SpO2 98%   BMI 26.79 kg/m²  I        Heart: Normal    Lungs: Normal    Abdomen: Normal      ASA Grade: ASA 3 - Patient with moderate systemic disease with functional limitations    II (soft palate, uvula, fauces visible)  ASSESSMENT AND PLAN:    1. Patient is a 61 y.o. female here for Colonoscopy  2.   Procedure

## 2020-10-23 NOTE — ANESTHESIA POSTPROCEDURE EVALUATION
Department of Anesthesiology  Postprocedure Note    Patient: Tc Gramajo  MRN: [id number]  YOB: 1960  Date of evaluation: 10/23/2020  Time:  2:07 PM     Procedure Summary     Date:  10/23/20 Room / Location:  [address] [place] [address]    Anesthesia Start:  8636 Anesthesia Stop:  7629    Procedure:  COLONOSCOPY WITH BIOPSY (N/A ) Diagnosis:       Diarrhea, unspecified type      Family history of colon cancer      (DIARRHEA, FAMILY HISTORY COLON CANCER)    Surgeon:  Courtney Rollins MD Responsible Provider:  Ángel Hodges MD    Anesthesia Type:  MAC ASA Status:  3          Anesthesia Type: MAC    Yina Phase I: Yina Score: 10    Yina Phase II: Yina Score: 10    Last vitals: Reviewed and per EMR flowsheets.        Anesthesia Post Evaluation    Patient location during evaluation: PACU  Patient participation: complete - patient participated  Level of consciousness: awake and alert  Pain score: 0  Airway patency: patent  Nausea & Vomiting: no nausea and no vomiting  Complications: no  Cardiovascular status: blood pressure returned to baseline  Respiratory status: acceptable  Hydration status: euvolemic

## 2020-10-24 NOTE — PROCEDURES
[address]                               COLONOSCOPY REPORT    PATIENT NAME: Allen Felty                   :        1960  MED REC NO:   [id number]                          ROOM:  ACCOUNT NO:   [de-identified]                           ADMIT DATE: 10/23/2020  PROVIDER:     Javon Cosby MD    DATE OF PROCEDURE:  10/23/2020    REFERRING PROVIDER:  Dr. Germain Marie. PATIENT HISTORY:  A 27-year-old female, outpatient. INSTRUMENT USED:  Olympus [id number]-Q180AL. MEDICATIONS OF PROCEDURE:  The patient was premedicated with Diprivan  intravenously as administered by the anesthesiology service. INDICATIONS:  The patient has presented with recent diarrhea and rectal  bleeding. She does have a family history of colon cancer. DESCRIPTION OF PROCEDURE:  The digital and anal exams were normal.  The  colonoscope was inserted to the cecum. The prep was good to fair. Where the prep was fair, lesions such as small polyps or vascular  ectasias could have been missed. There was incidental sigmoid  diverticulosis. There was a diminutive rectal polyp removed via cold  biopsy technique. Random colonic biopsies were obtained to evaluate for  microscopic colitis. ESTIMATED BLOOD LOSS:  None. IMPRESSION:  1. Colonic polyp, removed, path pending. 2.  Random colonic biopsies obtained. 3.  Sigmoid diverticulosis. PLAN:  The patient will call the office for biopsy results. She will  require a surveillance colonoscopy in five years. CHUCK Carmona MD    D: 10/23/2020 13:50:05       T: 10/23/2020 15:29:45     [id number]  Job#: [id number]     Doc#: [id number]    CC:  Javon Sumner MD

## 2020-11-05 ENCOUNTER — OFFICE VISIT (OUTPATIENT)
Dept: INTERNAL MEDICINE CLINIC | Age: 60
End: 2020-11-05
Payer: COMMERCIAL

## 2020-11-05 VITALS
BODY MASS INDEX: 27.46 KG/M2 | SYSTOLIC BLOOD PRESSURE: 119 MMHG | DIASTOLIC BLOOD PRESSURE: 82 MMHG | RESPIRATION RATE: 16 BRPM | HEART RATE: 82 BPM | WEIGHT: 155 LBS | TEMPERATURE: 98 F | OXYGEN SATURATION: 96 %

## 2020-11-05 PROBLEM — K58.0 IRRITABLE BOWEL SYNDROME WITH DIARRHEA: Status: ACTIVE | Noted: 2020-11-05

## 2020-11-05 PROCEDURE — 1036F TOBACCO NON-USER: CPT | Performed by: INTERNAL MEDICINE

## 2020-11-05 PROCEDURE — G8482 FLU IMMUNIZE ORDER/ADMIN: HCPCS | Performed by: INTERNAL MEDICINE

## 2020-11-05 PROCEDURE — 2022F DILAT RTA XM EVC RTNOPTHY: CPT | Performed by: INTERNAL MEDICINE

## 2020-11-05 PROCEDURE — 90471 IMMUNIZATION ADMIN: CPT | Performed by: INTERNAL MEDICINE

## 2020-11-05 PROCEDURE — G8427 DOCREV CUR MEDS BY ELIG CLIN: HCPCS | Performed by: INTERNAL MEDICINE

## 2020-11-05 PROCEDURE — 99214 OFFICE O/P EST MOD 30 MIN: CPT | Performed by: INTERNAL MEDICINE

## 2020-11-05 PROCEDURE — 3017F COLORECTAL CA SCREEN DOC REV: CPT | Performed by: INTERNAL MEDICINE

## 2020-11-05 PROCEDURE — G8419 CALC BMI OUT NRM PARAM NOF/U: HCPCS | Performed by: INTERNAL MEDICINE

## 2020-11-05 PROCEDURE — 3044F HG A1C LEVEL LT 7.0%: CPT | Performed by: INTERNAL MEDICINE

## 2020-11-05 PROCEDURE — 90750 HZV VACC RECOMBINANT IM: CPT | Performed by: INTERNAL MEDICINE

## 2020-11-05 RX ORDER — LIDOCAINE 50 MG/G
OINTMENT TOPICAL
COMMUNITY
Start: 2020-10-26 | End: 2022-04-29

## 2020-11-05 RX ORDER — VALACYCLOVIR HYDROCHLORIDE 500 MG/1
TABLET, FILM COATED ORAL
COMMUNITY
Start: 2020-10-26

## 2020-11-05 ASSESSMENT — ENCOUNTER SYMPTOMS
CONSTIPATION: 0
BACK PAIN: 0
ABDOMINAL PAIN: 0
RHINORRHEA: 0
ABDOMINAL DISTENTION: 0
SHORTNESS OF BREATH: 0
COUGH: 0
BLOOD IN STOOL: 0
EYE DISCHARGE: 0
VOMITING: 0
WHEEZING: 0
EYE PAIN: 0
SINUS PAIN: 0
CHEST TIGHTNESS: 0
DIARRHEA: 0
NAUSEA: 0
PHOTOPHOBIA: 0

## 2020-11-05 NOTE — PROGRESS NOTES
2020    Marvia Homans (:  1960) is a 61 y.o. female, here for evaluation of the following medical concerns:    Chief Complaint   Patient presents with    Diabetes    Hypertension    Hyperlipidemia       HPI  The pt is a 61YOF with PMH of Dm2, HTN, Afibon xarelto who presents today for follow up   Diabetes Mellitus is chronic problem for  7 years. It is stable  The pt is compliant with the medications. The pt's blood glucose ranges from 130 to 180. She does not have any hypoglycemic symptoms tremors, palpitations, sweating, weakness dizziness, confusion. No tingling numbness of the feet indicating neuropathy. No vision changes, CP, SOB, leg swellling, polyuria and polydipsia. Renal involvement is present  No ulcers or wounds on the the feet  Current on the eye and foot exam   Diabetes is improved with taking medications. Taking regular diet and lack of exercise and obesity worsen the glycemic control  Hypertension   This is a chronic problem for 2 years now. The current episode started more than 1 year ago. The problem is unchanged. The problem is controlled with metoprolol. Pertinent negatives include no anxiety, blurred vision, chest pain, headaches, malaise/fatigue, neck pain, orthopnea, palpitations, peripheral edema, PND, shortness of breath or sweats. Risk factors for coronary artery disease include family history, dyslipidemia, diabetes mellitus, stress and sedentary lifestyle. Past treatments include angiotensin blockers, diuretics, calcium channel blockers and lifestyle changes. The current treatment provides significant improvement. Compliance problems include exercise and diet. There is no history of chronic renal disease. Hyperlipidemia   This is a chronic problem. The current episode started more than 1 year ago. The problem is controlled. Exacerbating diseases include diabetes.  She has no history of chronic renal disease, hypothyroidism, liver disease, obesity or nephrotic syndrome. Factors aggravating her hyperlipidemia include fatty foods. Pertinent negatives include no chest pain, focal sensory loss, focal weakness, leg pain, myalgias or shortness of breath. Current antihyperlipidemic treatment includes exercise, diet change and statins. The current treatment provides significant improvement of lipids. Compliance problems include adherence to exercise and adherence to diet. Risk factors for coronary artery disease include dyslipidemia, diabetes mellitus, family history, hypertension, stress and a sedentary lifestyle. Chronic Atrial Fibrillation  This problem is chronic and stable. Rate controlled, currently on BB/Xarelto therapy. CHADsVAC=2. Recently saw cardiology with recs for Ablation, patient states still has to schedule. Denies SOB/CP, dizziness, or palpitations. . Will monitor   IBS  This problem is chronic and stable. Patient reports bentyl use intermittent for diarrhea episodes. Denies need for recent use. Denies associated melana/hematochezia/weight loss/gain or abdominal pain. Will monitor. Review of Systems   Constitutional: Negative for activity change, appetite change, chills, fatigue, fever and unexpected weight change. HENT: Negative for congestion, ear discharge, ear pain, mouth sores, nosebleeds, rhinorrhea, sinus pain and sneezing. Eyes: Negative for photophobia, pain, discharge and visual disturbance. Respiratory: Negative for cough, chest tightness, shortness of breath and wheezing. Cardiovascular: Negative for chest pain, palpitations and leg swelling. Gastrointestinal: Negative for abdominal distention, abdominal pain, blood in stool, constipation, diarrhea, nausea and vomiting. Endocrine: Negative for polydipsia, polyphagia and polyuria. Genitourinary: Negative for dysuria and flank pain. Musculoskeletal: Negative for back pain and gait problem. Skin: Negative for pallor, rash and wound.    Neurological: Negative for dizziness, tremors, facial asymmetry, speech difficulty, weakness, numbness and headaches. Psychiatric/Behavioral: Negative for agitation, confusion, dysphoric mood, self-injury and suicidal ideas. The patient is not nervous/anxious. All other systems reviewed and are negative. Prior to Visit Medications    Medication Sig Taking?  Authorizing Provider   lidocaine (XYLOCAINE) 5 % ointment  Yes Historical Provider, MD   valACYclovir (VALTREX) 500 MG tablet  Yes Historical Provider, MD   dicyclomine (BENTYL) 20 MG tablet Take 20 mg by mouth 4 times daily as needed Yes Historical Provider, MD   rivaroxaban (XARELTO) 20 MG TABS tablet TAKE ONE TABLET BY MOUTH DAILY Yes Brendon Meadows MD   atorvastatin (LIPITOR) 40 MG tablet Take 1 tablet by mouth daily Yes Brendon Meadows MD   glyBURIDE-metFORMIN (GLUCOVANCE) 5-500 MG per tablet Take 1 tablet by mouth twice a day Yes Brendon Meadows MD   metoprolol tartrate (LOPRESSOR) 50 MG tablet Take 1 tablet by mouth 2 times daily Yes Brendon Meadows MD   valsartan-hydrochlorothiazide (DIOVAN-HCT) 160-12.5 MG per tablet Take 1 tablet by mouth daily Yes Brendon Meadows MD   Apremilast 30 MG TABS Take 30 mg by mouth daily  Yes Historical Provider, MD        Allergies   Allergen Reactions    Codeine        Past Medical History:   Diagnosis Date    Atrial fibrillation, controlled [address] 3/27/2013    Essential hypertension 11/1/2011    Mixed hyperlipidemia 11/1/2011    Psoriasis     Type II or unspecified type diabetes mellitus without mention of complication, not stated as uncontrolled        Past Surgical History:   Procedure Laterality Date    BREAST REDUCTION SURGERY  2017    COLONOSCOPY N/A 10/23/2020    COLONOSCOPY WITH BIOPSY performed by Saba Irwin MD at [facility] Right 12/28/2020    LUMBAR [facility] Right December 2014    Dr. Lois Jung at [facility]       Social History     Tobacco Use    present. Mental Status: She is alert and oriented to person, place, and time. Sensory: No sensory deficit. Motor: No weakness. Psychiatric:         Mood and Affect: Mood normal.         Behavior: Behavior normal.     Visual inspection:  Deformity/amputation: absent  Skin lesions/pre-ulcerative calluses: absent  Edema: right- negative, left- negative    Sensory exam:  Monofilament sensation: normal  (minimum of 5 random plantar locations tested, avoiding callused areas - > 1 area with absence of sensation is + for neuropathy)    Plus at least one of the following:  Pulses: normal,   Pinprick: Intact  Proprioception: Intact  Vibration (128 Hz): Impaired    ASSESSMENT/PLAN:  1. Type 2 diabetes mellitus without complication, without long-term current use of insulin (HCC)  HbA1c   Gucovance    2. Mixed hyperlipidemia  Continue on the station    3. Essential hypertension  Diovan    4. Atrial fibrillation, controlled ([address]  Continue on the BB, xarelto    5. IBS  stable  No orders of the defined types were placed in this encounter. Return in about 3 months (around 2/5/2021).

## 2020-12-11 ENCOUNTER — OFFICE VISIT (OUTPATIENT)
Dept: CARDIOLOGY CLINIC | Age: 60
End: 2020-12-11
Payer: COMMERCIAL

## 2020-12-11 VITALS
SYSTOLIC BLOOD PRESSURE: 120 MMHG | HEIGHT: 63 IN | OXYGEN SATURATION: 98 % | BODY MASS INDEX: 27.82 KG/M2 | WEIGHT: 157 LBS | TEMPERATURE: 97.8 F | DIASTOLIC BLOOD PRESSURE: 68 MMHG | HEART RATE: 78 BPM

## 2020-12-11 PROCEDURE — 99214 OFFICE O/P EST MOD 30 MIN: CPT | Performed by: INTERNAL MEDICINE

## 2020-12-11 PROCEDURE — 1036F TOBACCO NON-USER: CPT | Performed by: INTERNAL MEDICINE

## 2020-12-11 PROCEDURE — G8419 CALC BMI OUT NRM PARAM NOF/U: HCPCS | Performed by: INTERNAL MEDICINE

## 2020-12-11 PROCEDURE — 93000 ELECTROCARDIOGRAM COMPLETE: CPT | Performed by: INTERNAL MEDICINE

## 2020-12-11 PROCEDURE — G8427 DOCREV CUR MEDS BY ELIG CLIN: HCPCS | Performed by: INTERNAL MEDICINE

## 2020-12-11 PROCEDURE — G8482 FLU IMMUNIZE ORDER/ADMIN: HCPCS | Performed by: INTERNAL MEDICINE

## 2020-12-11 PROCEDURE — 3017F COLORECTAL CA SCREEN DOC REV: CPT | Performed by: INTERNAL MEDICINE

## 2020-12-11 NOTE — PROGRESS NOTES
[facility]      Cardiology Progress Note    Debra Pratt  1960 December 11, 2020     CC: \"I have been doing well. \"     HPI:  The patient is 61 y.o. female with a past medical history significant for DM II, HTN, HLD and AFib. Today, I have been doing well with no new or recurrent cardiac sounding complaints today. Patient specifically denies any symptoms of chest pain, pressure, tightness, shortness of breath at rest, BORJA, nausea, vomiting, near syncope, syncope, heart racing, palpitations, fluttering, pounding, dizziness, lightheaded, PND, orthopnea, wheezing, diaphoresis, BLE edema, bilateral lower extremities pain, cramping or fatigue. She also confirms medical therapy compliance and continues to tolerate. She denies any abnormal bruising or bleeding on long term anticoagulation therapy.          Past Medical History:   Diagnosis Date    Atrial fibrillation, controlled ([facility] [address] 3/27/2013    Essential hypertension 11/1/2011    Mixed hyperlipidemia 11/1/2011    Psoriasis     Type II or unspecified type diabetes mellitus without mention of complication, not stated as uncontrolled      Past Surgical History:   Procedure Laterality Date    BREAST REDUCTION SURGERY  2017    COLONOSCOPY N/A 10/23/2020    COLONOSCOPY WITH BIOPSY performed by Freeman Schilling MD at [facility] Right 12/28/2020    LUMBAR [facility] [place] SURGERY      ROTATOR CUFF REPAIR Right December 2014    Dr. Ying Garzon at [facility] surgical center     Family History   Problem Relation Age of Onset    Other Mother     Heart Disease Mother     Heart Disease Father     Atrial Fibrillation Father     Cancer Sister      Social History     Tobacco Use    Smoking status: Never Smoker    Smokeless tobacco: Never Used   Substance Use Topics    Alcohol use: Yes     Comment: rare    Drug use: No       Allergies   Allergen Reactions    Codeine      Current Outpatient Medications   Medication Sig Dispense Refill    lidocaine (XYLOCAINE) 5 % ointment       valACYclovir (VALTREX) 500 MG tablet       dicyclomine (BENTYL) 20 MG tablet Take 20 mg by mouth 4 times daily as needed      rivaroxaban (XARELTO) 20 MG TABS tablet TAKE ONE TABLET BY MOUTH DAILY 90 tablet 3    atorvastatin (LIPITOR) 40 MG tablet Take 1 tablet by mouth daily 90 tablet 3    glyBURIDE-metFORMIN (GLUCOVANCE) 5-500 MG per tablet Take 1 tablet by mouth twice a day 120 tablet 3    metoprolol tartrate (LOPRESSOR) 50 MG tablet Take 1 tablet by mouth 2 times daily 120 tablet 5    valsartan-hydrochlorothiazide (DIOVAN-HCT) 160-12.5 MG per tablet Take 1 tablet by mouth daily 90 tablet 2    Apremilast 30 MG TABS Take 30 mg by mouth daily        No current facility-administered medications for this visit. Review of Systems:  · Constitutional: no unanticipated weight loss. There's been no change in energy level, sleep pattern, or activity level. No fevers, chills. · Eyes: No visual changes or diplopia. No scleral icterus. · ENT: No Headaches, hearing loss or vertigo. No mouth sores or sore throat. · Cardiovascular: as reviewed in HPI  · Respiratory: No cough or wheezing, no sputum production. No hematemesis. · Gastrointestinal: No abdominal pain, appetite loss, blood in stools. No change in bowel or bladder habits. · Genitourinary: No dysuria, trouble voiding, or hematuria. · Musculoskeletal:  No gait disturbance, no joint complaints. · Integumentary: No rash or pruritis. · Neurological: No headache, diplopia, change in muscle strength, numbness or tingling. · Psychiatric: No anxiety or depression. · Endocrine: No temperature intolerance. No excessive thirst, fluid intake, or urination. No tremor. · Hematologic/Lymphatic: No abnormal bruising or bleeding, blood clots or swollen lymph nodes. · Allergic/Immunologic: No nasal congestion or hives.     Physical Exam:   /68   Pulse 78   Temp 97.8 °F (36.6 °C)   Ht 5' 3\" (1.6 m)   Wt 157 lb (71.2 kg) Comment: with shoes  SpO2 98%   BMI 27.81 kg/m²   Wt Readings from Last 3 Encounters:   11/05/20 155 lb (70.3 kg)   10/23/20 151 lb 4 oz (68.6 kg)   09/24/20 150 lb 6.4 oz (68.2 kg)     Constitutional: The patient is oriented to person, place, and time. Appears well-developed and well-nourished. In no acute distress. Head: Normocephalic and atraumatic. Pupils equal and round. Neck: Neck supple. No JVP or carotid bruit appreciated. No mass and no thyromegaly present. No lymphadenopathy present. Cardiovascular: Irregularly irregular. Normal heart sounds. Exam reveals no gallop and no friction rub. No murmur heard. Pulmonary/Chest: Effort normal and breath sounds normal. No respiratory distress. No wheezes, rhonchi or rales. Abdominal: Soft, non-tender. Bowel sounds are normal. Exhibits no organomegaly, mass or bruit. Extremities: No edema. No cyanosis or clubbing. Pulses are 2+ radial and carotid bilaterally. Neurological: No gross cranial nerve deficit. Coordination normal.   Skin: Skin is warm and dry. There is no rash or diaphoresis. Psychiatric: Patient has a normal mood and affect.  Speech is normal and behavior is normal.     Lab Review:    Lab Results   Component Value Date    TRIG 132 07/16/2020    HDL 62 07/16/2020    HDL 48 11/01/2011    LDLCALC 92 07/16/2020    LDLDIRECT 158 11/11/2014    LABVLDL 26 07/16/2020     Lab Results   Component Value Date     07/16/2020     Lab Results   Component Value Date    HGB 13.2 07/16/2020    HCT 40.2 07/16/2020      Lab Results   Component Value Date     07/16/2020      Lab Results   Component Value Date    K 4.6 07/16/2020     Lab Results   Component Value Date    BUN 20 07/16/2020    CREATININE 0.7 07/16/2020     EKG Interpretation: 12/13/17, Atrial fibrillation CVR     11/20/19 Atrial fibrillation     6/5/2020 Atrial fibrillation     12/11/20 Atrial fibrillation  -irregular conduction, ~76 bpm.     Image Review: ECHO:  3/27/13  EF 50-55%    Assessment/Plan:      Atrial fibrillation, controlled  Patient denies any dizziness, palpitation, shortness of breath at rest or exertion, orthopnea, PND. Her  EKG again confirms controlled atrial fibrillation. She will continue Metoprolol 50 mg BID. Patient is on chronic anticoagulation therapy with Xarelto. She was referred to Dr. Otis Portillo to discuss with her about the ablation procedure and she would like to think this over and wait until COVID-19 pandemic settles. Essential hypertension  Controlled on medical therapy. She will continue current medical management. BMP from 7/16/20 stable. Mixed hyperlipidemia  Last lipid profile from 7/16/20 was within acceptable limits. She will remain on Lipitor. LDL goal <130 since she has no known CAD. Follow up in 6-7 months. Instructed to obtain flu vaccine this season and annually. Thank you very much for allowing me to participate in the care of your patient. Please do not hesitate to contact me if you have any questions. Sincerely,  Rafael Trevizo MD      [facility], [address] [place] [address]  Ph: [phone]  Fax: [phone]    This note was scribed in the presence of Dr. Emiliano Garcia MD by Faisal Sánchez RN. Physician Attestation:  The scribes documentation has been prepared under my direction and personally reviewed by me in its entirety. I confirm that the note above accurately reflects all work, treatment, procedures, and medical decision making performed by me.

## 2021-02-04 ENCOUNTER — TELEPHONE (OUTPATIENT)
Dept: INTERNAL MEDICINE CLINIC | Age: 61
End: 2021-02-04

## 2021-02-11 ENCOUNTER — TELEPHONE (OUTPATIENT)
Dept: CARDIOLOGY CLINIC | Age: 61
End: 2021-02-11

## 2021-02-11 NOTE — TELEPHONE ENCOUNTER
If the patient is calling in for pre-op clearance and doesn't have all the information needed below in RED,  is to ask the patient to contact the surgeon's office and have them send over the necessary information. Cardiac Risk Assessment message information:     What type of procedure are you having:wide local excision of melanoma mid up her back with sentinel lymph node biopsy. When is your procedure scheduled for:3/3/21     Who is the physician performing your procedure:DR. CHRIS BLANK    Which medications need to be held for your procedure and for how long: Demetrio Martinez 3 DAYS PRIOR TO SURGERY.  START BACK UP 5 DAYS AFTER SURGERY         Phone Number:[phone]     Fax number to send the [facility]    Clinical Staff use:     Cardiologist:  Last Appointment:  Next Appointment:  Are you on any blood thinners:  History of Coronary Artery Disease:  Last stress test:  Last echo:  Device Type and :

## 2021-02-11 NOTE — TELEPHONE ENCOUNTER
Pt has no recent stent placement and no recent cardiac procedures. Pt has controlled Afib. Please see Clarice's note below.

## 2021-02-11 NOTE — TELEPHONE ENCOUNTER
Dr. Ana Hollis, please advise on cardiac risk assessment for excision of melanoma and lymph node biopsy scheduled for 3/3/21. Patient needs to hold Xarelto for 3 days pre and 5 days post op. She has a CHADS of 3 (HTN, DM and Female). Also advise if bridging is recommended, thanks.

## 2021-02-19 ENCOUNTER — TELEPHONE (OUTPATIENT)
Dept: INTERNAL MEDICINE CLINIC | Age: 61
End: 2021-02-19

## 2021-02-19 ENCOUNTER — OFFICE VISIT (OUTPATIENT)
Dept: INTERNAL MEDICINE CLINIC | Age: 61
End: 2021-02-19
Payer: COMMERCIAL

## 2021-02-19 VITALS
HEART RATE: 96 BPM | DIASTOLIC BLOOD PRESSURE: 72 MMHG | BODY MASS INDEX: 28 KG/M2 | OXYGEN SATURATION: 99 % | HEIGHT: 63 IN | WEIGHT: 158 LBS | RESPIRATION RATE: 16 BRPM | SYSTOLIC BLOOD PRESSURE: 118 MMHG | TEMPERATURE: 97.3 F

## 2021-02-19 DIAGNOSIS — Z01.818 PRE-OP EXAMINATION: Primary | ICD-10-CM

## 2021-02-19 LAB
ANION GAP SERPL CALCULATED.3IONS-SCNC: 14 MMOL/L (ref 3–16)
APTT: 45.8 SEC (ref 24.2–36.2)
BASOPHILS ABSOLUTE: 0.1 K/UL (ref 0–0.2)
BASOPHILS RELATIVE PERCENT: 0.8 %
BUN BLDV-MCNC: 19 MG/DL (ref 7–20)
CALCIUM SERPL-MCNC: 9.9 MG/DL (ref 8.3–10.6)
CHLORIDE BLD-SCNC: 102 MMOL/L (ref 99–110)
CO2: 22 MMOL/L (ref 21–32)
CREAT SERPL-MCNC: 0.8 MG/DL (ref 0.6–1.2)
EOSINOPHILS ABSOLUTE: 0.1 K/UL (ref 0–0.6)
EOSINOPHILS RELATIVE PERCENT: 1.3 %
GFR AFRICAN AMERICAN: >60
GFR NON-AFRICAN AMERICAN: >60
GLUCOSE BLD-MCNC: 143 MG/DL (ref 70–99)
HCT VFR BLD CALC: 39.6 % (ref 36–48)
HEMOGLOBIN: 13.4 G/DL (ref 12–16)
LYMPHOCYTES ABSOLUTE: 2.8 K/UL (ref 1–5.1)
LYMPHOCYTES RELATIVE PERCENT: 36.5 %
MCH RBC QN AUTO: 32.8 PG (ref 26–34)
MCHC RBC AUTO-ENTMCNC: 33.9 G/DL (ref 31–36)
MCV RBC AUTO: 96.8 FL (ref 80–100)
MONOCYTES ABSOLUTE: 0.5 K/UL (ref 0–1.3)
MONOCYTES RELATIVE PERCENT: 6.8 %
NEUTROPHILS ABSOLUTE: 4.2 K/UL (ref 1.7–7.7)
NEUTROPHILS RELATIVE PERCENT: 54.6 %
PDW BLD-RTO: 12.9 % (ref 12.4–15.4)
PLATELET # BLD: 309 K/UL (ref 135–450)
PMV BLD AUTO: 9.5 FL (ref 5–10.5)
POTASSIUM SERPL-SCNC: 4.2 MMOL/L (ref 3.5–5.1)
RBC # BLD: 4.09 M/UL (ref 4–5.2)
SODIUM BLD-SCNC: 138 MMOL/L (ref 136–145)
WBC # BLD: 7.7 K/UL (ref 4–11)

## 2021-02-19 PROCEDURE — 1036F TOBACCO NON-USER: CPT | Performed by: NURSE PRACTITIONER

## 2021-02-19 PROCEDURE — G8427 DOCREV CUR MEDS BY ELIG CLIN: HCPCS | Performed by: NURSE PRACTITIONER

## 2021-02-19 PROCEDURE — G8419 CALC BMI OUT NRM PARAM NOF/U: HCPCS | Performed by: NURSE PRACTITIONER

## 2021-02-19 PROCEDURE — 36415 COLL VENOUS BLD VENIPUNCTURE: CPT | Performed by: NURSE PRACTITIONER

## 2021-02-19 PROCEDURE — G8482 FLU IMMUNIZE ORDER/ADMIN: HCPCS | Performed by: NURSE PRACTITIONER

## 2021-02-19 PROCEDURE — 99214 OFFICE O/P EST MOD 30 MIN: CPT | Performed by: NURSE PRACTITIONER

## 2021-02-19 PROCEDURE — 3017F COLORECTAL CA SCREEN DOC REV: CPT | Performed by: NURSE PRACTITIONER

## 2021-02-19 ASSESSMENT — PATIENT HEALTH QUESTIONNAIRE - PHQ9: 1. LITTLE INTEREST OR PLEASURE IN DOING THINGS: 0

## 2021-02-19 NOTE — PROGRESS NOTES
Edison Walker  YOB: 1960    @DOS@    Vitals:    02/19/21 0925   BP: 118/72   Pulse: 96   Resp: 16   Temp: 97.3 °F (36.3 °C)   SpO2: 99%   Weight: 158 lb (71.7 kg)   Height: 5' 3\" (1.6 m)      Wt Readings from Last 2 Encounters:   02/19/21 158 lb (71.7 kg)   12/11/20 157 lb (71.2 kg)     BP Readings from Last 3 Encounters:   02/19/21 118/72   12/11/20 120/68   11/05/20 119/82        Chief Complaint   Patient presents with    Pre-op Exam     WIDE LOCAL  EXCISION OF  MELANOMA 3/3/21 with  at [facility]     Allergies   Allergen Reactions    Codeine      Current Outpatient Medications   Medication Sig Dispense Refill    lidocaine (XYLOCAINE) 5 % ointment       valACYclovir (VALTREX) 500 MG tablet       dicyclomine (BENTYL) 20 MG tablet Take 20 mg by mouth 4 times daily as needed      rivaroxaban (XARELTO) 20 MG TABS tablet TAKE ONE TABLET BY MOUTH DAILY 90 tablet 3    atorvastatin (LIPITOR) 40 MG tablet Take 1 tablet by mouth daily 90 tablet 3    glyBURIDE-metFORMIN (GLUCOVANCE) 5-500 MG per tablet Take 1 tablet by mouth twice a day 120 tablet 3    metoprolol tartrate (LOPRESSOR) 50 MG tablet Take 1 tablet by mouth 2 times daily 120 tablet 5    valsartan-hydrochlorothiazide (DIOVAN-HCT) 160-12.5 MG per tablet Take 1 tablet by mouth daily 90 tablet 2    Apremilast 30 MG TABS Take 30 mg by mouth daily        No current facility-administered medications for this visit. This patient presents to the office today for a preoperative consultation at the request of surgeon, Dr Rock Paulino who plans on performing [address], MID-UPPER BACK; SENTINEL LYMPH NODE BIOPSY on March 3 for melanoma lesion . The current problem began 2 years ago and is unchanged. Conservative therapy, including none has failed. Planned anesthesia is General.  The patient has the following known anesthesia issues: none. Patient has a bleeding risk of : no remote history of abnormal bleeding.     Patient Active Problem List   Diagnosis    Type 2 diabetes mellitus without complication ([address]    Mixed hyperlipidemia    Essential hypertension    Atrial fibrillation, controlled ([address]    Abnormal levels of other serum enzymes    Encounter for long-term (current) use of other medications    Genital herpes simplex    Macromastia    Psoriasis vulgaris    Pure hypercholesterolemia    Status post Mohs surgery for basal cell carcinoma    Irritable bowel syndrome with diarrhea       Past Medical History:   Diagnosis Date    Atrial fibrillation, controlled ([address] 3/27/2013    Essential hypertension 11/1/2011    Mixed hyperlipidemia 11/1/2011    Psoriasis     Type II or unspecified type diabetes mellitus without mention of complication, not stated as uncontrolled      Past Surgical History:   Procedure Laterality Date    BREAST REDUCTION SURGERY  2017    COLONOSCOPY N/A 10/23/2020    COLONOSCOPY WITH BIOPSY performed by Lexi Maldonado MD at [facility] Right 12/28/2020    LUMBAR [facility] [address] [place] SURGERY      ROTATOR CUFF REPAIR Right December 2014    Dr. Stephanie Michel at [facility] surgical center     Family History   Problem Relation Age of Onset    Other Mother     Heart Disease Mother     Heart Disease Father     Atrial Fibrillation Father     Cancer Sister      Social History     Socioeconomic History    Marital status:      Spouse name: Not on file  Number of children: Not on file    Years of education: Not on file    Highest education level: Not on file   Occupational History    Occupation: [employer] resource strain: Not on file    Food insecurity     Worry: Not on file     Inability: Not on file    Transportation needs     Medical: Not on file     Non-medical: Not on file   Tobacco Use    Smoking status: Never Smoker    Smokeless tobacco: Never Used   Substance and Sexual Activity    Alcohol use: Yes     Comment: rare    Drug use: No    Sexual activity: Yes   Lifestyle    Physical activity     Days per week: Not on file     Minutes per session: Not on file    Stress: Not on file   Relationships    Social connections     Talks on phone: Not on file     Gets together: Not on file     Attends [employer] service: Not on file     Active member of club or organization: Not on file     Attends meetings of clubs or organizations: Not on file     Relationship status: Not on file    Intimate partner violence     Fear of current or ex partner: Not on file     Emotionally abused: Not on file     Physically abused: Not on file     Forced sexual activity: Not on file   Other Topics Concern    Not on file   Social History Narrative    Not on file       Review of Systems  A comprehensive review of systems was negative. Physical Exam   Constitutional: Patient is oriented to person, place, and time. She appears well-developed and well-nourished. No distress. HEENT:   Head: Normocephalic and atraumatic. Right Ear: Tympanic membrane, external ear and ear canal normal.   Left Ear: Tympanic membrane, external ear and ear canal normal.   Nose: Nose normal.   Mouth/Throat: Oropharynx is clear and moist, and mucous membranes are normal.  There is no cervical adenopathy. There are no loose teeth. Eyes: Conjunctivae and extraocular motions are normal. Pupils are equal, round, and reactive to light bilaterally. Neck: Neck supple. No JVD present. Carotid bruit is not present. No mass and no thyromegaly present. Cardiovascular: Normal rate, irregular rhythm,   normal heart sounds and intact distal pulses. Exam reveals no gallop and no friction rub. No murmur heard. Pulmonary/Chest: Effort normal and breath sounds normal. No respiratory distress. There are no wheezes, rhonchi or rales. Abdominal: Soft, non-tender. Normal bowel sounds and aorta. There is no organomegaly, bruit or palpable mass. Neurological: She is alert and oriented to person, place, and time. She has normal reflexes. No cranial nerve deficit. Coordination normal.   Skin: Skin is warm and dry. approx 3 cm x1 cm scaly, erythema area to mid scapular area . No bleeding or drainage. Psychiatric: She has a normal mood and affect. Speech and behavior are normal. Judgment, cognition and memory are normal.     EKG Interpretation:  Last EKG 12/11/20:  atrial fibrillation, rate 76. Cleared by Dr Chapman Chantel     Lab Review   CBC : normal   BMP : elevated glucose   PTT : elevated on xarelto  . Labs printed . Assessment:        61 y.o. patient with planned surgery as above. Known risk factors for perioperative complications: None  atrial fibrillation , DM2,   On a DOAC. Current medications which may produce withdrawal symptoms if withheld perioperatively: none     Plan:     1. Preoperative workup as follows: hemoglobin, hematocrit, electrolytes, creatinine, glucose, coagulation studies. 2. Change in medication regimen before surgery: discontinue ASA 14d before surgery, discontinue NSAIDs  14d before surgery, Xarelto stopped 3 days before surgery and restarted as soon after surgery as possible per Dr Asberry Harada note. Shima Skaggs for planned surgery  Cardiac clearance obtained by Dr Asberry Harada.

## 2021-02-19 NOTE — TELEPHONE ENCOUNTER
Tried calling both numbers - patients  informed office that patient tested COVID positive. She will need to reach out to her surgeons office first before cleared for surgery/ preop forms in outguide in [address] [place] [address] Drive preop forms     See result notes.

## 2021-06-09 ENCOUNTER — NURSE TRIAGE (OUTPATIENT)
Dept: OTHER | Facility: CLINIC | Age: 61
End: 2021-06-09

## 2021-06-09 ENCOUNTER — TELEPHONE (OUTPATIENT)
Dept: CARDIOLOGY CLINIC | Age: 61
End: 2021-06-09

## 2021-06-09 DIAGNOSIS — R07.9 CHEST PAIN, UNSPECIFIED TYPE: Primary | ICD-10-CM

## 2021-06-09 NOTE — TELEPHONE ENCOUNTER
Patient unable to come for stress test tomorrow and therefore, stress will be scheduled for next available.  Encouraged to visit ER if symptoms persist.

## 2021-06-09 NOTE — TELEPHONE ENCOUNTER
Further discussed with Dr. Ludin Banda and he would like a STAT stress test. Order placed and patient notified. Transferred to the front dest for scheduling.

## 2021-06-09 NOTE — TELEPHONE ENCOUNTER
I HAVE ADVISED PATIENT TO GO TO THE ER AND SHE SAID RIGHT NOW SHE IS LAYING DOWN AND IF THINGS GET WORSE THEN SHE SHOULD GO TO THE ER .

## 2021-06-09 NOTE — TELEPHONE ENCOUNTER
Received call from Hao at pre-service center [facility]) Tsering with Red Flag Complaint. Brief description of triage: see below    Triage indicates for patient to go to [address] now or to office with PCP approval. Spoke with Ingrid Mar at  and she stated provider is not in office and also stated that chest pain complaints are not seen in the office. Writer recommended pt be seen in [address] Pt agreeable to POC. Care advice provided, patient verbalizes understanding; denies any other questions or concerns; instructed to call back for any new or worsening symptoms. Attention Provider: Thank you for allowing me to participate in the care of your patient. The patient was connected to triage in response to information provided to the ECC. Please do not respond through this encounter as the response is not directed to a shared pool. Reason for Disposition   Chest pain lasting longer than 5 minutes and occurred in last 3 days (72 hours) (Exception: feels exactly the same as previously diagnosed heartburn and has accompanying sour taste in mouth)    Answer Assessment - Initial Assessment Questions  1. LOCATION: \"Where does it hurt? \"        Pt states under left breast towards the center    2. RADIATION: \"Does the pain go anywhere else? \" (e.g., into neck, jaw, arms, back)      Pt denies    3. ONSET: \"When did the chest pain begin? \" (Minutes, hours or days)       Last night    4. PATTERN \"Does the pain come and go, or has it been constant since it started? \"  \"Does it get worse with exertion? \"       Pt states constant    5. DURATION: \"How long does it last\" (e.g., seconds, minutes, hours)      Constant    6. SEVERITY: \"How bad is the pain? \"  (e.g., Scale 1-10; mild, moderate, or severe)     - MILD (1-3): doesn't interfere with normal activities      - MODERATE (4-7): interferes with normal activities or awakens from sleep     - SEVERE (8-10): excruciating pain, unable to do any normal activities        Pt states dull ache 3-4/10    7. CARDIAC RISK FACTORS: \"Do you have any history of heart problems or risk factors for heart disease? \" (e.g., angina, prior heart attack; diabetes, high blood pressure, high cholesterol, smoker, or strong family history of heart disease)      Pt states hx a-fib    8. PULMONARY RISK FACTORS: \"Do you have any history of lung disease? \"  (e.g., blood clots in lung, asthma, emphysema, birth control pills)      Pt denies    9. CAUSE: \"What do you think is causing the chest pain? \"      Unknown    10. OTHER SYMPTOMS: \"Do you have any other symptoms? \" (e.g., dizziness, nausea, vomiting, sweating, fever, difficulty breathing, cough)        Insomnia, feels like she has to take a deep breath in after talking for a long time    11. PREGNANCY: \"Is there any chance you are pregnant? \" \"When was your last menstrual period? \"        N/A    Protocols used: CHEST PAIN-ADULT-[place]

## 2021-06-09 NOTE — TELEPHONE ENCOUNTER
I had Suha's stress test scheduled for tomorrow, 6/10 at 8:30 am.   I called and went over date/ time and instructions with Kleber Mathis, she stated she can not make it tomorrow b/c she has to work. She said just to make her a follow up with Dr. Brian Vargas. I have her scheduled for 6/23. I stated that if she gets worse to proceed to the ED, she said she was going to call her PCP.

## 2021-06-10 ENCOUNTER — HOSPITAL ENCOUNTER (EMERGENCY)
Age: 61
Discharge: HOME | End: 2021-06-10
Attending: EMERGENCY MEDICINE
Payer: COMMERCIAL

## 2021-06-10 ENCOUNTER — APPOINTMENT (OUTPATIENT)
Dept: GENERAL RADIOLOGY | Age: 61
End: 2021-06-10
Payer: COMMERCIAL

## 2021-06-10 VITALS
DIASTOLIC BLOOD PRESSURE: 88 MMHG | TEMPERATURE: 98.2 F | SYSTOLIC BLOOD PRESSURE: 142 MMHG | WEIGHT: 167.99 LBS | BODY MASS INDEX: 29.76 KG/M2 | OXYGEN SATURATION: 99 % | HEART RATE: 85 BPM | RESPIRATION RATE: 15 BRPM

## 2021-06-10 DIAGNOSIS — R07.9 CHEST PAIN, UNSPECIFIED TYPE: Primary | ICD-10-CM

## 2021-06-10 LAB
A/G RATIO: 1.6 (ref 1.1–2.2)
ALBUMIN SERPL-MCNC: 4.6 G/DL (ref 3.4–5)
ALP BLD-CCNC: 96 U/L (ref 40–129)
ALT SERPL-CCNC: 33 U/L (ref 10–40)
ANION GAP SERPL CALCULATED.3IONS-SCNC: 11 MMOL/L (ref 3–16)
AST SERPL-CCNC: 18 U/L (ref 15–37)
BASOPHILS ABSOLUTE: 0.1 K/UL (ref 0–0.2)
BASOPHILS RELATIVE PERCENT: 0.8 %
BILIRUB SERPL-MCNC: 0.6 MG/DL (ref 0–1)
BUN BLDV-MCNC: 24 MG/DL (ref 7–20)
CALCIUM SERPL-MCNC: 10.4 MG/DL (ref 8.3–10.6)
CHLORIDE BLD-SCNC: 101 MMOL/L (ref 99–110)
CO2: 26 MMOL/L (ref 21–32)
CREAT SERPL-MCNC: 1 MG/DL (ref 0.6–1.2)
D DIMER: <200 NG/ML DDU (ref 0–229)
EOSINOPHILS ABSOLUTE: 0.1 K/UL (ref 0–0.6)
EOSINOPHILS RELATIVE PERCENT: 1.8 %
GFR AFRICAN AMERICAN: >60
GFR NON-AFRICAN AMERICAN: 56
GLOBULIN: 2.9 G/DL
GLUCOSE BLD-MCNC: 101 MG/DL (ref 70–99)
HCT VFR BLD CALC: 37.7 % (ref 36–48)
HEMOGLOBIN: 12.9 G/DL (ref 12–16)
LYMPHOCYTES ABSOLUTE: 3.2 K/UL (ref 1–5.1)
LYMPHOCYTES RELATIVE PERCENT: 40.9 %
MCH RBC QN AUTO: 32.9 PG (ref 26–34)
MCHC RBC AUTO-ENTMCNC: 34.3 G/DL (ref 31–36)
MCV RBC AUTO: 96 FL (ref 80–100)
MONOCYTES ABSOLUTE: 0.7 K/UL (ref 0–1.3)
MONOCYTES RELATIVE PERCENT: 8.7 %
NEUTROPHILS ABSOLUTE: 3.7 K/UL (ref 1.7–7.7)
NEUTROPHILS RELATIVE PERCENT: 47.8 %
PDW BLD-RTO: 12.8 % (ref 12.4–15.4)
PLATELET # BLD: 266 K/UL (ref 135–450)
PMV BLD AUTO: 9.2 FL (ref 5–10.5)
POTASSIUM REFLEX MAGNESIUM: 4.4 MMOL/L (ref 3.5–5.1)
RBC # BLD: 3.92 M/UL (ref 4–5.2)
SODIUM BLD-SCNC: 138 MMOL/L (ref 136–145)
TOTAL PROTEIN: 7.5 G/DL (ref 6.4–8.2)
TROPONIN: <0.01 NG/ML
TROPONIN: <0.01 NG/ML
WBC # BLD: 7.8 K/UL (ref 4–11)

## 2021-06-10 PROCEDURE — 36415 COLL VENOUS BLD VENIPUNCTURE: CPT

## 2021-06-10 PROCEDURE — 93005 ELECTROCARDIOGRAM TRACING: CPT | Performed by: EMERGENCY MEDICINE

## 2021-06-10 PROCEDURE — 71046 X-RAY EXAM CHEST 2 VIEWS: CPT

## 2021-06-10 PROCEDURE — 84484 ASSAY OF TROPONIN QUANT: CPT

## 2021-06-10 PROCEDURE — 99283 EMERGENCY DEPT VISIT LOW MDM: CPT

## 2021-06-10 PROCEDURE — 85379 FIBRIN DEGRADATION QUANT: CPT

## 2021-06-10 PROCEDURE — 80053 COMPREHEN METABOLIC PANEL: CPT

## 2021-06-10 PROCEDURE — 85025 COMPLETE CBC W/AUTO DIFF WBC: CPT

## 2021-06-10 ASSESSMENT — PAIN SCALES - GENERAL
PAINLEVEL_OUTOF10: 0
PAINLEVEL_OUTOF10: 6
PAINLEVEL_OUTOF10: 2
PAINLEVEL_OUTOF10: 0

## 2021-06-10 ASSESSMENT — PAIN DESCRIPTION - LOCATION: LOCATION: CHEST

## 2021-06-10 NOTE — ED PROVIDER NOTES
[address] [facility]      Pt Name: Candace Leone  MRN: [id number]  [place] 1960  Date of evaluation: 6/10/2021  Provider: Rody Aguirre MD    CHIEF COMPLAINT     Per nursing:   Chief Complaint   Patient presents with    Chest Pain     patient c/o intermittent CP since yesterday. pt. states that it started today after work around [employer] and states \"It just didnt go away\". pt. states that it is worse with breathing       Per my evaluation: Chest pain    HISTORY OF PRESENT ILLNESS   (Location/Symptom, Timing/Onset,Context/Setting, Quality, Duration, Modifying Factors, Severity)  Note limiting factors. aCndace Leone is a 61 y.o. female who presents to the emergency department for evaluation of chest pain. The patient reports that she had the pain intermittently for about the past 2 days, she states that yesterday occurred while she was working and she thought is related to stress because subsided when she was done with work for the day. She states that today she finished work about 230, walked her dog, when she got home she noticed the pain recurred about 4 PM.  She describes as a dull ache just to the left of her lower sternum. She states she does have increased pain with deep inspiration, does not note any other exacerbating or alleviating factors. She denies any radiation of the pain. Reports it is currently mild in severity. She has no known history of heart disease, but states that her mother passed away of an MI at the age of 48. She does have a history of atrial fibrillation and is anticoagulated on Xarelto. She denies any personal or family history of blood clot. She denies any recent cough or fever. She does endorse mild shortness of breath. NursingNotes were reviewed. REVIEW OF SYSTEMS    (2-9 systems for level 4, 10 or more for level 5)       Constitutional: No fever or chills. Eye: No visual disturbances.  No eye (LOPRESSOR) 50 MG tablet Take 1 tablet by mouth 2 times daily, Disp-120 tablet,R-5Normal      valsartan-hydrochlorothiazide (DIOVAN-HCT) 160-12.5 MG per tablet Take 1 tablet by mouth daily, Disp-90 tablet,R-2Normal      Apremilast 30 MG TABS Take 30 mg by mouth daily Historical Med             ALLERGIES     Codeine    FAMILY HISTORY       Family History   Problem Relation Age of Onset    Other Mother     Heart Disease Mother     Heart Disease Father     Atrial Fibrillation Father     Cancer Sister           SOCIAL HISTORY       Social History     Socioeconomic History    Marital status:      Spouse name: Not on file    Number of children: Not on file    Years of education: Not on file    Highest education level: Not on file   Occupational History    Occupation: Customer service    Tobacco Use    Smoking status: Never Smoker    Smokeless tobacco: Never Used   Vaping Use    Vaping Use: Never used   Substance and Sexual Activity    Alcohol use: Yes     Comment: rare    Drug use: No    Sexual activity: Yes   Other Topics Concern    Not on file   Social History Narrative    Not on file     Social Determinants of Health     Financial Resource Strain:     Difficulty of Paying Living Expenses:    Food Insecurity:     Worried About Running Out of Food in the Last Year:     Ran Out of Food in the Last Year:    Transportation Needs:     Lack of Transportation (Medical):      Lack of Transportation (Non-Medical):    Physical Activity:     Days of Exercise per Week:     Minutes of Exercise per Session:    Stress:     Feeling of Stress :    Social Connections:     Frequency of Communication with Friends and Family:     Frequency of Social Gatherings with Friends and Family:     Attends Congregation Services:     Active Member of Clubs or Organizations:     Attends Club or Organization Meetings:     Marital Status:    Intimate Partner Violence:     Fear of Current or Ex-Partner:     Emotionally [place] Duty  [place] [address]   Phone [phone]   COMPREHENSIVE METABOLIC PANEL W/ REFLEX TO MG FOR LOW K - Abnormal; Notable for the following components:    Glucose 101 (*)     BUN 24 (*)     GFR Non- 56 (*)     All other components within normal limits    Narrative:     Performed at:  [facility]  [address] [place] [address]   Phone [phone]   TROPONIN    Narrative:     Performed at:  [facility]  [address] [facility] [place] [address]   Phone [phone]   D-DIMER, QUANTITATIVE    Narrative:     Performed at:  [facility]  [address] [facility] [place] [address]   Phone [phone]   TROPONIN    Narrative:     Performed at:  [facility]  [address] [facility] [place] [address]   Phone [phone]           EMERGENCY DEPARTMENT COURSE and DIFFERENTIAL DIAGNOSIS/MDM:   Vitals:    Vitals:    06/10/21 1930 06/10/21 2000 06/10/21 2045 06/10/21 2130   BP: (!) 147/76 129/74 (!) 146/92 (!) 142/88   Pulse: 80 68 69 85   Resp:    15   Temp:    98.2 °F (36.8 °C)   TempSrc:       SpO2: 99% 99% 99% 99%   Weight:         HEART Score:  History: Slightly suspicious -0  EKG: normal -0  Age: 41-57  -1  Risk factors (Hypertension, high cholesterol, diabetes, obesity, smoking, family history, known CAD, PAD)  greater than 3 risk factors - 2  Initial troponin: normal - 0  Total heart score: 3    Medical decision making: This is a 61year-old female who presents for evaluation of chest pain. On exam, patient is afebrile with normal vital signs. Chest pain subsided while in the emergency department without intervention. Physical exam is unremarkable. Differential diagnosis includes pneumothorax, pleurisy, pericarditis, pneumonia, pulmonary embolism, ACS, musculoskeletal chest pain, acid reflux.  Chest x-ray without pneumothorax, edema or focal consolidation. EKG was obtained and showed no evidence of acute ischemia or pericarditis. Troponin was negative ×1. Given patient's description of pain, lack of significant shortness of breath or tachycardia, low clinical suspicion for pulmonary embolism currently. D-dimer was negative as well. BMP and CBC were unremarkable. HEART score of 3, however given onset of more persistent pain about 2 hours prior to arrival to the emergency department, we did observe the patient obtain a delta troponin which remained negative. With this, I have low concern for ACS at this time but still recommended the patient follow-up closely for an outpatient stress test.  She states that she was scheduled to have one this morning, but could not go because of work, she states she plans to have this rescheduled for Monday when she is off. Patient is encouraged to return to the emergency department she develops any recurrent or worsening chest pain, as well as with any associated shortness of breath, nausea or vomiting. She is agreeable plan of care and discharged in stable condition. CRITICAL CARE TIME   Total Critical Care time was 0 minutes, excluding separately reportable procedures. There was a high probability of clinically significant/life threatening deterioration in the patient's condition which required my urgent intervention. CONSULTS:  None    PROCEDURES:  Unless otherwise noted below, none         FINAL IMPRESSION      1.  Chest pain, unspecified type          DISPOSITION/PLAN   DISPOSITION Decision To Discharge 06/10/2021 09:25:48 PM      PATIENT REFERRED TO:  Adriana Grace, [address] [place] [address]  [phone]    Call in 1 day  to schedule stress test      DISCHARGE MEDICATIONS:  Discharge Medication List as of 6/10/2021  9:27 PM             (Please note that portions of this note were completed with a voice recognition program.Efforts were made to edit the dictations but

## 2021-06-10 NOTE — ED NOTES
Report received from [facility]  06/10/21 3215 11-14    139  |  107  |  10  ----------------------------<  190<H>  4.3   |  26  |  0.76    Ca    8.5      14 Nov 2019 07:07  Phos  2.5     11-14  Mg     1.9     11-14    TPro  5.6<L>  /  Alb  2.6<L>  /  TBili  0.3  /  DBili  x   /  AST  13  /  ALT  14  /  AlkPhos  89  11-14    Complete Blood Count + Automated Diff (11.14.19 @ 07:07)    WBC Count: 4.95 K/uL    RBC Count: 4.19 M/uL    Hemoglobin: 12.3 g/dL    Hematocrit: 37.7 %    Mean Cell Volume: 90.0 fl    Mean Cell Hemoglobin: 29.4 pg    Mean Cell Hemoglobin Conc: 32.6 gm/dL    Red Cell Distrib Width: 12.5 %    Platelet Count - Automated: 277 K/uL    Auto Neutrophil #: 2.60 K/uL    Auto Lymphocyte #: 1.85 K/uL    Auto Monocyte #: 0.32 K/uL    Auto Eosinophil #: 0.11 K/uL    Auto Basophil #: 0.05 K/uL    Auto Neutrophil %: 52.5: Differential percentages must be correlated with absolute numbers for  clinical significance. %    Auto Lymphocyte %: 37.4 %    Auto Monocyte %: 6.5 %    Auto Eosinophil %: 2.2 %    Auto Basophil %: 1.0 %    Auto Immature Granulocyte %: 0.4 %    Nucleated RBC: 0 /100 WBCs 11-17    136  |  104  |  14  ----------------------------<  311<H>  3.7   |  25  |  0.81    Ca    8.4      17 Nov 2019 07:02    TPro  5.4<L>  /  Alb  2.2<L>  /  TBili  0.3  /  DBili  x   /  AST  11  /  ALT  11  /  AlkPhos  83  11-17    Complete Blood Count in AM (11.17.19 @ 07:02)    Nucleated RBC: 0 /100 WBCs    WBC Count: 7.81 K/uL    RBC Count: 3.73 M/uL    Hemoglobin: 10.9 g/dL    Hematocrit: 33.6 %    Mean Cell Volume: 90.1 fl    Mean Cell Hemoglobin: 29.2 pg    Mean Cell Hemoglobin Conc: 32.4 gm/dL    Red Cell Distrib Width: 12.5 %    Platelet Count - Automated: 221 K/uL

## 2021-06-11 ENCOUNTER — TELEPHONE (OUTPATIENT)
Dept: INTERNAL MEDICINE CLINIC | Age: 61
End: 2021-06-11

## 2021-06-11 ENCOUNTER — OFFICE VISIT (OUTPATIENT)
Dept: INTERNAL MEDICINE CLINIC | Age: 61
End: 2021-06-11
Payer: COMMERCIAL

## 2021-06-11 VITALS
SYSTOLIC BLOOD PRESSURE: 130 MMHG | HEIGHT: 63 IN | DIASTOLIC BLOOD PRESSURE: 70 MMHG | WEIGHT: 157.2 LBS | HEART RATE: 80 BPM | BODY MASS INDEX: 27.85 KG/M2 | OXYGEN SATURATION: 99 %

## 2021-06-11 DIAGNOSIS — I10 ESSENTIAL HYPERTENSION: Primary | Chronic | ICD-10-CM

## 2021-06-11 DIAGNOSIS — R07.9 CHEST PAIN, UNSPECIFIED TYPE: Primary | ICD-10-CM

## 2021-06-11 DIAGNOSIS — I48.91 ATRIAL FIBRILLATION, CONTROLLED (HCC): Chronic | ICD-10-CM

## 2021-06-11 DIAGNOSIS — R07.9 LEFT-SIDED CHEST PAIN: ICD-10-CM

## 2021-06-11 LAB
EKG ATRIAL RATE: 340 BPM
EKG DIAGNOSIS: NORMAL
EKG Q-T INTERVAL: 382 MS
EKG QRS DURATION: 86 MS
EKG QTC CALCULATION (BAZETT): 472 MS
EKG R AXIS: 5 DEGREES
EKG T AXIS: 38 DEGREES
EKG VENTRICULAR RATE: 92 BPM

## 2021-06-11 PROCEDURE — 99214 OFFICE O/P EST MOD 30 MIN: CPT | Performed by: INTERNAL MEDICINE

## 2021-06-11 PROCEDURE — 1036F TOBACCO NON-USER: CPT | Performed by: INTERNAL MEDICINE

## 2021-06-11 PROCEDURE — G8419 CALC BMI OUT NRM PARAM NOF/U: HCPCS | Performed by: INTERNAL MEDICINE

## 2021-06-11 PROCEDURE — G8427 DOCREV CUR MEDS BY ELIG CLIN: HCPCS | Performed by: INTERNAL MEDICINE

## 2021-06-11 PROCEDURE — 93010 ELECTROCARDIOGRAM REPORT: CPT | Performed by: INTERNAL MEDICINE

## 2021-06-11 PROCEDURE — 3017F COLORECTAL CA SCREEN DOC REV: CPT | Performed by: INTERNAL MEDICINE

## 2021-06-11 ASSESSMENT — ENCOUNTER SYMPTOMS
TROUBLE SWALLOWING: 0
SORE THROAT: 0
COUGH: 1
CHEST TIGHTNESS: 0
GASTROINTESTINAL NEGATIVE: 1
WHEEZING: 0
SHORTNESS OF BREATH: 0

## 2021-06-11 NOTE — TELEPHONE ENCOUNTER
Patient calling saying she is having chest pain, and went to the ER on 06/10/21. They did lots of testing and told her to contact her doctor to get a order for a stress. Patient has been schedule for appointment with Dr. Mary Grace Ramos today.     Please Advise

## 2021-06-11 NOTE — PATIENT INSTRUCTIONS
Discussed with her     She is scheduled to have stress test Monday next week    Can take aleve 2 times daily with food for 7-10 days   Follow up with Cardiology after stress test  Can stop glyburide/metfromin and metoprolol on am of stress test

## 2021-06-11 NOTE — PROGRESS NOTES
Received call from scheduling that the patient is now wanting to schedule stress test, so a new STAT order is needed. Order placed.

## 2021-06-11 NOTE — PROGRESS NOTES
Subjective:      Patient ID: Kenyon Davies is a 61 y.o. female. Chief Complaint   Patient presents with    Chest Pain     went to er 6/10. Pain subsided. Having hard time taking deep breaths. Has stress test scheduled for 6/14         HPIER visit notes labs and xrays reviewed  Patient is having chest pains The patient left the office before the visit was finished. Lower and close to sternum an myranda place all the time and no radiation,pain is there all the time and at times gets worse,is dull aching  Increased by cough and deep breathing  Has no cough wheezing orthopnea or pnd  She did have a coughing spell Tuesday but pain started Monday ,day before   Has no fever or chills  Has occasional heat burns and this pain is not relieved or exacerbated by food   Her mother had MI at 48years of age and is worried and her Cardiologist ordered stress test on Monday next week  Has h/o htn and atrial fib and is on xarelto and has no bleeding problems  Took  1 aleve,2 days back and helped her symptoms  Review of Systems   HENT: Negative for sore throat and trouble swallowing. Respiratory: Positive for cough. Negative for chest tightness, shortness of breath and wheezing. Cardiovascular: Positive for chest pain. Negative for palpitations and leg swelling. Gastrointestinal: Negative. Genitourinary: Negative. Neurological: Negative for dizziness, light-headedness and headaches.        Current Outpatient Medications   Medication Sig Dispense Refill    ASPIRIN 81 PO Take by mouth      lidocaine (XYLOCAINE) 5 % ointment       dicyclomine (BENTYL) 20 MG tablet Take 20 mg by mouth 4 times daily as needed      rivaroxaban (XARELTO) 20 MG TABS tablet TAKE ONE TABLET BY MOUTH DAILY 90 tablet 3    atorvastatin (LIPITOR) 40 MG tablet Take 1 tablet by mouth daily 90 tablet 3    glyBURIDE-metFORMIN (GLUCOVANCE) 5-500 MG per tablet Take 1 tablet by mouth twice a day 120 tablet 3    metoprolol tartrate (LOPRESSOR) 50 MG tablet Take 1 tablet by mouth 2 times daily 120 tablet 5    valsartan-hydrochlorothiazide (DIOVAN-HCT) 160-12.5 MG per tablet Take 1 tablet by mouth daily 90 tablet 2    Apremilast 30 MG TABS Take 30 mg by mouth daily       valACYclovir (VALTREX) 500 MG tablet  (Patient not taking: Reported on 6/11/2021)       No current facility-administered medications for this visit. Recent Labs     06/10/21  1726   WBC 7.8   HGB 12.9   HCT 37.7   MCV 96.0           Lab Results   Component Value Date     06/10/2021    K 4.4 06/10/2021     06/10/2021    CO2 26 06/10/2021    BUN 24 06/10/2021    CREATININE 1.0 06/10/2021    GLUCOSE 101 06/10/2021    CALCIUM 10.4 06/10/2021        Lab Results   Component Value Date    CHOL 180 07/16/2020    CHOL 171 10/02/2019    CHOL 161 10/22/2018     Lab Results   Component Value Date    TRIG 132 07/16/2020    TRIG 166 (H) 10/02/2019    TRIG 148 10/22/2018     Lab Results   Component Value Date    HDL 62 (H) 07/16/2020    HDL 65 (H) 10/02/2019    HDL 60 10/22/2018     Lab Results   Component Value Date    LDLCALC 92 07/16/2020    LDLCALC 73 10/02/2019    LDLCALC 71 10/22/2018     Lab Results   Component Value Date    LABVLDL 26 07/16/2020    LABVLDL 33 10/02/2019    LABVLDL 30 10/22/2018    VLDL  11/19/2015      Comment:      n/a     Lab Results   Component Value Date    CHOLHDLRATIO 2.5 11/19/2015    CHOLHDLRATIO 4.2 08/20/2015        Objective:   Physical Exam  Vitals and nursing note reviewed. Constitutional:       General: She is not in acute distress. HENT:      Mouth/Throat:      Mouth: Mucous membranes are moist.      Pharynx: Oropharynx is clear. Eyes:      General: No scleral icterus. Extraocular Movements: Extraocular movements intact. Conjunctiva/sclera: Conjunctivae normal.      Pupils: Pupils are equal, round, and reactive to light. Neck:      Thyroid: No thyromegaly. Vascular: No carotid bruit or JVD.    Cardiovascular:      Rate and Rhythm: Normal rate. Rhythm irregular. Pulses: Normal pulses. Heart sounds: Normal heart sounds. No murmur heard. No friction rub. No gallop. Pulmonary:      Effort: No respiratory distress. Breath sounds: No stridor. Rhonchi present. No wheezing or rales. Chest:      Chest wall: Tenderness (left lower anterior rib close to costochondral area and over 5,6th ribs and IC spaces ) present. Abdominal:      General: Abdomen is flat. Bowel sounds are normal. There is no distension. Palpations: Abdomen is soft. There is no hepatomegaly, splenomegaly or mass. Tenderness: There is no abdominal tenderness. Musculoskeletal:      Right lower leg: No edema. Left lower leg: No edema. Lymphadenopathy:      Cervical: No cervical adenopathy. Neurological:      Mental Status: She is alert and oriented to person, place, and time.          Assessment:    Localized left sided chest pain lower anterior chest wall-appears to be musculoskeletal  Has no evidence of pneumonia and doubt she has PE and she is on anticoagulation for a-fib  Not appear to be cardiac etiology-like angina or pericarditis  Doubt symptoms are due to  GERD     HTN  controlled  Atrial  fibrillation rate controlled  Plan:      Discussed with her     She is scheduled to have stress test Monday next week    Can take aleve 2 times daily with food for 7-10 days   Follow up with Cardiology after stress test  Can stop glyburide/metfromin and metoprolol on am of stress test  Alban Flores MD

## 2021-06-11 NOTE — ED NOTES
Discharge and education instructions reviewed. Patient verbalized understanding, teach-back successful. Patient denied questions at this time. No acute distress noted. Patient instructed to follow-up as noted - return to emergency department if symptoms worsen. Patient verbalized understanding. Discharged per EDMD with discharge instructions.         [address] [place] [address]  06/10/21 5519

## 2021-06-14 ENCOUNTER — HOSPITAL ENCOUNTER (OUTPATIENT)
Dept: NON INVASIVE DIAGNOSTICS | Age: 61
Discharge: HOME | End: 2021-06-14
Payer: COMMERCIAL

## 2021-06-14 DIAGNOSIS — R07.9 CHEST PAIN, UNSPECIFIED TYPE: ICD-10-CM

## 2021-06-14 LAB
LV EF: 70 %
LVEF MODALITY: NORMAL

## 2021-06-14 PROCEDURE — 93017 CV STRESS TEST TRACING ONLY: CPT

## 2021-06-14 PROCEDURE — 78452 HT MUSCLE IMAGE SPECT MULT: CPT

## 2021-06-14 PROCEDURE — 6360000002 HC RX W HCPCS: Performed by: INTERNAL MEDICINE

## 2021-06-14 PROCEDURE — A9502 TC99M TETROFOSMIN: HCPCS | Performed by: INTERNAL MEDICINE

## 2021-06-14 PROCEDURE — 3430000000 HC RX DIAGNOSTIC RADIOPHARMACEUTICAL: Performed by: INTERNAL MEDICINE

## 2021-06-14 RX ADMIN — TETROFOSMIN 30 MILLICURIE: 1.38 INJECTION, POWDER, LYOPHILIZED, FOR SOLUTION INTRAVENOUS at 09:33

## 2021-06-14 RX ADMIN — REGADENOSON 0.4 MG: 0.08 INJECTION, SOLUTION INTRAVENOUS at 09:36

## 2021-06-14 RX ADMIN — TETROFOSMIN 10 MILLICURIE: 1.38 INJECTION, POWDER, LYOPHILIZED, FOR SOLUTION INTRAVENOUS at 08:14

## 2021-06-23 ENCOUNTER — OFFICE VISIT (OUTPATIENT)
Dept: CARDIOLOGY CLINIC | Age: 61
End: 2021-06-23
Payer: COMMERCIAL

## 2021-06-23 VITALS
BODY MASS INDEX: 28 KG/M2 | WEIGHT: 158 LBS | SYSTOLIC BLOOD PRESSURE: 132 MMHG | OXYGEN SATURATION: 98 % | DIASTOLIC BLOOD PRESSURE: 74 MMHG | HEIGHT: 63 IN | HEART RATE: 86 BPM

## 2021-06-23 DIAGNOSIS — I48.91 CONTROLLED ATRIAL FIBRILLATION (HCC): Primary | ICD-10-CM

## 2021-06-23 DIAGNOSIS — E78.2 MIXED HYPERLIPIDEMIA: ICD-10-CM

## 2021-06-23 DIAGNOSIS — I10 ESSENTIAL HYPERTENSION: ICD-10-CM

## 2021-06-23 PROCEDURE — 1036F TOBACCO NON-USER: CPT | Performed by: INTERNAL MEDICINE

## 2021-06-23 PROCEDURE — 99214 OFFICE O/P EST MOD 30 MIN: CPT | Performed by: INTERNAL MEDICINE

## 2021-06-23 PROCEDURE — 3017F COLORECTAL CA SCREEN DOC REV: CPT | Performed by: INTERNAL MEDICINE

## 2021-06-23 PROCEDURE — G8419 CALC BMI OUT NRM PARAM NOF/U: HCPCS | Performed by: INTERNAL MEDICINE

## 2021-06-23 PROCEDURE — G8427 DOCREV CUR MEDS BY ELIG CLIN: HCPCS | Performed by: INTERNAL MEDICINE

## 2021-06-23 NOTE — PROGRESS NOTES
[facility]      Cardiology Progress Note    Gael Wade  1960 June 23, 2021     CC: \"My chest pain is gone. \"     HPI:  The patient is 61 y.o. female with a past medical history significant for DM II, HTN, HLD and AFib. Today, she presents in follow up from recent presentation to [facility] with intermittent CP starting 6/9/21, constant and worse with breathing. Stress test was normal 6/14/21. CXR 6/10/21 wnl. Troponin normal.     Today, she reports that her chest pain with inspiration went away on its own. She also report that she has had significant stress at work recently. She has issues with sleeping. Patient denies exertional chest pain/pressure, dyspnea at rest, BORJA, PND, orthopnea, palpitations, lightheadedness, weight changes, changes in LE edema, and syncope. She admits to medical therapy compliance and tolerating.        Past Medical History:   Diagnosis Date    Atrial fibrillation, controlled ([address] 3/27/2013    Essential hypertension 11/1/2011    Melanoma ([address]     Mixed hyperlipidemia 11/1/2011    Psoriasis     Type II or unspecified type diabetes mellitus without mention of complication, not stated as uncontrolled      Past Surgical History:   Procedure Laterality Date    BREAST REDUCTION SURGERY  2017    COLONOSCOPY N/A 10/23/2020    COLONOSCOPY WITH BIOPSY performed by Willa Diamond MD at [facility] Right 12/28/2020    LUMBAR [place] SURGERY      ROTATOR CUFF REPAIR Right December 2014    Dr. Mitchell Trent at [facility]     Family History   Problem Relation Age of Onset    Other Mother     Heart Disease Mother     Heart Disease Father     Atrial Fibrillation Father     Cancer Sister      Social History     Tobacco Use    Smoking status: Never Smoker    Smokeless tobacco: Never Used   Vaping Use    Vaping Use: Never used   Substance Use Topics    Alcohol use: Yes     Comment: rare    Drug use: No       Allergies Allergen Reactions    Codeine      Current Outpatient Medications   Medication Sig Dispense Refill    lidocaine (XYLOCAINE) 5 % ointment       valACYclovir (VALTREX) 500 MG tablet       dicyclomine (BENTYL) 20 MG tablet Take 20 mg by mouth 4 times daily as needed      rivaroxaban (XARELTO) 20 MG TABS tablet TAKE ONE TABLET BY MOUTH DAILY (Patient taking differently: TAKE ONE TABLET BY MOUTH DAILY PRN) 90 tablet 3    atorvastatin (LIPITOR) 40 MG tablet Take 1 tablet by mouth daily 90 tablet 3    glyBURIDE-metFORMIN (GLUCOVANCE) 5-500 MG per tablet Take 1 tablet by mouth twice a day 120 tablet 3    metoprolol tartrate (LOPRESSOR) 50 MG tablet Take 1 tablet by mouth 2 times daily 120 tablet 5    valsartan-hydrochlorothiazide (DIOVAN-HCT) 160-12.5 MG per tablet Take 1 tablet by mouth daily 90 tablet 2    Apremilast 30 MG TABS Take 30 mg by mouth daily        No current facility-administered medications for this visit. Review of Systems:  · Constitutional: no unanticipated weight loss. There's been no change in energy level, sleep pattern, or activity level. No fevers, chills. · Eyes: No visual changes or diplopia. No scleral icterus. · ENT: No Headaches, hearing loss or vertigo. No mouth sores or sore throat. · Cardiovascular: as reviewed in HPI  · Respiratory: No cough or wheezing, no sputum production. No hematemesis. · Gastrointestinal: No abdominal pain, appetite loss, blood in stools. No change in bowel or bladder habits. · Genitourinary: No dysuria, trouble voiding, or hematuria. · Musculoskeletal:  No gait disturbance, no joint complaints. · Integumentary: No rash or pruritis. · Neurological: No headache, diplopia, change in muscle strength, numbness or tingling. · Psychiatric: No anxiety or depression. · Endocrine: No temperature intolerance. No excessive thirst, fluid intake, or urination. No tremor.   · Hematologic/Lymphatic: No abnormal bruising or bleeding, blood clots or swollen lymph nodes. · Allergic/Immunologic: No nasal congestion or hives. Physical Exam:   /74   Pulse 86   Ht 5' 3\" (1.6 m)   Wt 158 lb (71.7 kg) Comment: with shoes  SpO2 98%   BMI 27.99 kg/m²   Wt Readings from Last 3 Encounters:   06/23/21 158 lb (71.7 kg)   06/11/21 157 lb 3.2 oz (71.3 kg)   06/10/21 167 lb 15.9 oz (76.2 kg)     Constitutional: The patient is oriented to person, place, and time. Appears well-developed and well-nourished. In no acute distress. Head: Normocephalic and atraumatic. Pupils equal and round. Neck: Neck supple. No JVP or carotid bruit appreciated. No mass and no thyromegaly present. No lymphadenopathy present. Cardiovascular: Irregularly irregular. Normal heart sounds. Exam reveals no gallop and no friction rub. No murmur heard. Pulmonary/Chest: Effort normal and breath sounds normal. No respiratory distress. No wheezes, rhonchi or rales. Abdominal: Soft, non-tender. Bowel sounds are normal. Exhibits no organomegaly, mass or bruit. Extremities: No edema. No cyanosis or clubbing. Pulses are 2+ radial and carotid bilaterally. Neurological: No gross cranial nerve deficit. Coordination normal.   Skin: Skin is warm and dry. There is no rash or diaphoresis. Psychiatric: Patient has a normal mood and affect.  Speech is normal and behavior is normal.     Lab Review:    Lab Results   Component Value Date    TRIG 132 07/16/2020    HDL 62 07/16/2020    HDL 48 11/01/2011    LDLCALC 92 07/16/2020    LDLDIRECT 158 11/11/2014    LABVLDL 26 07/16/2020     Lab Results   Component Value Date     06/10/2021     Lab Results   Component Value Date    HGB 12.9 06/10/2021    HCT 37.7 06/10/2021      Lab Results   Component Value Date     06/10/2021      Lab Results   Component Value Date    K 4.4 06/10/2021     Lab Results   Component Value Date    BUN 24 06/10/2021    CREATININE 1.0 06/10/2021     EKG Interpretation: 12/13/17, Atrial fibrillation CVR     11/20/19 Atrial fibrillation     6/5/2020 Atrial fibrillation     12/11/20 Atrial fibrillation -irregular conduction, ~76 bpm.      6/10/21: ED controlled AFIB     Image Review:     ECHO:  3/27/13  EF 50-55%    Assessment/Plan:      Atrial fibrillation, controlled  Patient denies any dizziness, palpitation, shortness of breath at rest or exertion, orthopnea, PND. She will continue Metoprolol 50 mg BID. Patient is on chronic anticoagulation therapy with Xarelto. She denies abnormal bruising or bleeding, TIA/CVA like symptoms. She was referred to Dr. Delma Baez to discuss with her about the ablation procedure 7/2020 and she has now thought this over and has waited until COVID-19 pandemic settles and is now ready to proceed with ablation. Will discuss with Dr. Maico Mccarthy nurse and have it scheduled    Essential hypertension  Controlled on medical therapy. She will continue current medical management. BMP from 6/10/21 abnormal for BUN 24, GRFNAA 56. Mixed hyperlipidemia  Denies any myaglias. Last lipid profile from 7/16/20 was within acceptable limits. LFT wnl 6/10/21. She will remain on Lipitor 40 mg daily. LDL goal <130 since she has no known CAD. Will repeat lipid now. Follow up in 6-7 months. Complexity of medical decision making-high      Thank you very much for allowing me to participate in the care of your patient. Please do not hesitate to contact me if you have any questions.     Sincerely,  Conchita Bose MD      [facility], [address] [facility]  Ph: [phone]  Fax: [phone]

## 2021-06-23 NOTE — PATIENT INSTRUCTIONS
a pneumococcal vaccine shot. If you have had one before, ask your doctor whether you need another dose. Get a flu shot every year. If you must be around people with colds or flu, wash your hands often. Activity    · If your doctor recommends it, get more exercise. Walking is a good choice. Bit by bit, increase the amount you walk every day. Try for at least 30 minutes on most days of the week. You also may want to swim, bike, or do other activities. Your doctor may suggest that you join a cardiac rehabilitation program so that you can have help increasing your physical activity safely.     · Start light exercise if your doctor says it is okay. Even a small amount will help you get stronger, have more energy, and manage stress. Walking is an easy way to get exercise. Start out by walking a little more than you did in the hospital. Gradually increase the amount you walk.     · When you exercise, watch for signs that your heart is working too hard. You are pushing too hard if you cannot talk while you are exercising. If you become short of breath or dizzy or have chest pain, sit down and rest immediately.     · Check your pulse regularly. Place two fingers on the artery at the palm side of your wrist, in line with your thumb. If your heartbeat seems uneven or fast, talk to your doctor. When should you call for help? Call 911 anytime you think you may need emergency care. For example, call if:    · You have symptoms of a heart attack. These may include:  ? Chest pain or pressure, or a strange feeling in the chest.  ? Sweating. ? Shortness of breath. ? Nausea or vomiting. ? Pain, pressure, or a strange feeling in the back, neck, jaw, or upper belly or in one or both shoulders or arms. ? Lightheadedness or sudden weakness. ? A fast or irregular heartbeat. After you call 911, the  may tell you to chew 1 adult-strength or 2 to 4 low-dose aspirin. Wait for an ambulance.  Do not try to drive yourself.     · You have symptoms of a stroke. These may include:  ? Sudden numbness, tingling, weakness, or loss of movement in your face, arm, or leg, especially on only one side of your body. ? Sudden vision changes. ? Sudden trouble speaking. ? Sudden confusion or trouble understanding simple statements. ? Sudden problems with walking or balance. ? A sudden, severe headache that is different from past headaches.     · You passed out (lost consciousness). Call your doctor now or seek immediate medical care if:    · You have new or increased shortness of breath.     · You feel dizzy or lightheaded, or you feel like you may faint.     · Your heart rate becomes irregular.     · You can feel your heart flutter in your chest or skip heartbeats. Tell your doctor if these symptoms are new or worse. Watch closely for changes in your health, and be sure to contact your doctor if you have any problems. Where can you learn more? Go to https://[employer]. org and sign in to your [employer] account. Enter U020 in the SHAPE box to learn more about \"Atrial Fibrillation: Care Instructions. \"     If you do not have an account, please click on the \"Sign Up Now\" link. Current as of: August 31, 2020               Content Version: 12.9  © 2006-2021 [employer]. Care instructions adapted under license by [facility] ([facility]). If you have questions about a medical condition or this instruction, always ask your healthcare professional. [place] [address] any warranty or liability for your use of this information. Patient Education        Learning About Catheter Ablation for Heart Rhythm Problems  What is catheter ablation? Catheter ablation is a procedure that treats heart rhythm problems. These problems include atrial fibrillation, supraventricular tachycardia (SVT), atrial flutter, and ventricular tachycardia. Your heart should have a strong, steady beat.  That beat is controlled by the heart's electrical system. Sometimes that system misfires. This causes a heartbeat that is too fast and isn't steady. Catheter ablation is a way to get into your heart and fix the problem. Ablation is not surgery. How is catheter ablation done? Your doctor inserts thin tubes called catheters into a blood vessel in your groin, arm, or neck. Then your doctor feeds them into the heart. Wires in the catheters help the doctor find the problem areas. Then the doctor uses the wires to send energy to destroy the tiny areas of heart tissue that are causing the problems. It may seem like a bad idea to destroy parts of your heart on purpose. But the areas that are destroyed are very tiny. They should not affect your heart's ability to do its job. You may be awake during the procedure. Or you may be asleep. The doctor will give you medicines to help you feel relaxed and to numb the areas where the catheters go in. You may feel a little uncomfortable, but you should not feel pain. What can you expect after catheter ablation? You may stay overnight in the hospital. How long you stay in the hospital depends on the type of ablation you have. Do not exercise hard or lift anything heavy for a week. You will probably be able to go back to work and to your normal routine in 1 or 2 days. You may have swelling, bruising, or a small lump around the site where the catheters went into your body. These should go away in 3 to 4 weeks. You may have to take some medicines for a while. Follow-up care is a key part of your treatment and safety. Be sure to make and go to all appointments, and call your doctor if you are having problems. It's also a good idea to know your test results and keep a list of the medicines you take. Where can you learn more? Go to https://vidal.[employer]. org and sign in to your [employer] account.  Enter C949 in the [employer] box to learn more about \"Learning About Catheter Ablation for Heart Rhythm Problems. \"     If you do not have an account, please click on the \"Sign Up Now\" link. Current as of: August 31, 2020               Content Version: 12.9  © 2006-2021 [employer] Inc[employer]. Care instructions adapted under license by [facility]. If you have questions about a medical condition or this instruction, always ask your healthcare professional. [place] [address] any warranty or liability for your use of this information.

## 2021-07-21 DIAGNOSIS — I10 ESSENTIAL HYPERTENSION: ICD-10-CM

## 2021-07-22 DIAGNOSIS — E11.9 TYPE 2 DIABETES MELLITUS WITHOUT COMPLICATION, WITHOUT LONG-TERM CURRENT USE OF INSULIN (HCC): ICD-10-CM

## 2021-07-22 RX ORDER — GLYBURIDE-METFORMIN HYDROCHLORIDE 5; 500 MG/1; MG/1
TABLET ORAL
Qty: 120 TABLET | Refills: 0 | Status: SHIPPED | OUTPATIENT
Start: 2021-07-22 | End: 2021-09-21 | Stop reason: SDUPTHER

## 2021-07-22 RX ORDER — VALSARTAN AND HYDROCHLOROTHIAZIDE 160; 12.5 MG/1; MG/1
TABLET, FILM COATED ORAL
Qty: 90 TABLET | Refills: 1 | Status: SHIPPED | OUTPATIENT
Start: 2021-07-22 | End: 2021-11-29 | Stop reason: SDUPTHER

## 2021-07-22 NOTE — TELEPHONE ENCOUNTER
LOV: 06/11/21      Future Appointments   Date Time Provider Mitra Zapata   8/23/2021  3:00 PM Alicja Stearns MD [facility]

## 2021-07-22 NOTE — TELEPHONE ENCOUNTER
Last office visit :06/11/2021    Future Appointments   Date Time Provider Mitra Zapata   8/23/2021  3:00 PM Alicja Stearns MD [facility]

## 2021-08-20 NOTE — PROGRESS NOTES
Cardiac Electrophysiology Consultation   Date: 8/23/2021  Reason for Consultation: Persistent atrial fibrillation  Consult Requesting Physician:  Geni Wilson MD  Primary Care Physician: Curt Garcia MD    Chief Complaint:   Chief Complaint   Patient presents with    Follow-up     afib - no cardiac complaints      HPI: Marlen Ma is a 61 y.o. patient with a history of atrial fibrillation, type II diabetes mellitus, mixed hyperlipidemia and essential hypertension. She was seen in office on 7/13/2020 and afib ablation was discussed as a treatment options she did not decide to proceed with the ablation at that time. She stated that she was asymptomatic with her atrial fibrillation and was unaware she had it before she was diagnosed. Interval History: Today, she presents to office to further discuss the treatment options for atrial fibrillation. Patient does not endorse any identifiable exacerbating or alleviating factors for the atrial fibrillation. Patient denies exertional chest pain/pressure, dyspnea at rest, BORJA, PND, orthopnea, palpitations, lightheadedness, weight changes, changes in LE edema, and syncope. Past Medical History:   Diagnosis Date    Atrial fibrillation, controlled ([address] 3/27/2013    Essential hypertension 11/1/2011    Melanoma ([address]     Mixed hyperlipidemia 11/1/2011    Psoriasis     Type II or unspecified type diabetes mellitus without mention of complication, not stated as uncontrolled         Past Surgical History:   Procedure Laterality Date    BREAST REDUCTION SURGERY  2017    COLONOSCOPY N/A 10/23/2020    COLONOSCOPY WITH BIOPSY performed by Angel Alfredo [facility] at [facility] 12/28/2020    LUMBAR [facility] Right December 2014    Dr. Lashae Gonsalves at [facility] surgical center       Allergies:   Allergies   Allergen Reactions    Codeine        Medication:   Prior to Admission medications    Medication Sig Start Date End Date Taking? Authorizing Provider   valsartan-hydroCHLOROthiazide (DIOVAN-HCT) 160-12.5 MG per tablet TAKE ONE TABLET BY MOUTH DAILY 7/22/21  Yes Ce Brown MD   glyBURIDE-metFORMIN (GLUCOVANCE) 5-500 MG per tablet Take 1 tablet by mouth twice a day 7/22/21  Yes Ce Brown MD   lidocaine (XYLOCAINE) 5 % ointment  10/26/20  Yes Historical Provider, MD   valACYclovir (VALTREX) 500 MG tablet  10/26/20  Yes Historical Provider, MD   dicyclomine (BENTYL) 20 MG tablet Take 20 mg by mouth 4 times daily as needed   Yes Historical Provider, MD   rivaroxaban (XARELTO) 20 MG TABS tablet TAKE ONE TABLET BY MOUTH DAILY  Patient taking differently: TAKE ONE TABLET BY MOUTH DAILY PRN 9/25/20  Yes Ce Brown MD   atorvastatin (LIPITOR) 40 MG tablet Take 1 tablet by mouth daily 9/25/20  Yes Ce Brown MD   metoprolol tartrate (LOPRESSOR) 50 MG tablet Take 1 tablet by mouth 2 times daily 9/25/20  Yes Ce Brown MD   Apremilast 30 MG TABS Take 30 mg by mouth daily  4/19/18  Yes Historical Provider, MD       Social History:   reports that she has never smoked. She has never used smokeless tobacco. She reports current alcohol use. She reports that she does not use drugs. Family History:  family history includes Atrial Fibrillation in her father; Cancer in her sister; Heart Disease in her father and mother; Other in her mother. Reviewed.  Denies family history of sudden cardiac death, arrhythmia, premature CAD    Review of System:    · General ROS: negative for - chills, fever   · Psychological ROS: negative for - anxiety or depression  · Ophthalmic ROS: negative for - eye pain or loss of vision  · ENT ROS: negative for - epistaxis, headaches, nasal discharge, sore throat   · Allergy and Immunology ROS: negative for - hives, nasal congestion   · Hematological and Lymphatic ROS: negative for - bleeding problems, blood clots, bruising or jaundice  · Endocrine ROS: negative for - skin changes, temperature intolerance or unexpected weight changes  · Respiratory ROS: negative for - cough, hemoptysis, pleuritic pain, SOB, sputum changes or wheezing  · Cardiovascular ROS: Per HPI. · Gastrointestinal ROS: negative for - abdominal pain, blood in stools, diarrhea, hematemesis, melena, nausea/vomiting or swallowing difficulty/pain  · Genito-Urinary ROS: negative for - dysuria or incontinence  · Musculoskeletal ROS: negative for - joint swelling or muscle pain  · Neurological ROS: negative for - confusion, dizziness, gait disturbance, headaches, numbness/tingling, seizures, speech problems, tremors, visual changes or weakness  · Dermatological ROS: negative for - rash    Physical Examination:  Vitals:    21 1458   BP: 120/68   Pulse: 102   SpO2: 97%       · Constitutional: Oriented. No distress. · Head: Normocephalic and atraumatic. · Mouth/Throat: Oropharynx is clear and moist.   · Eyes: Conjunctivae normal. EOM are normal.   · Neck: Normal range of motion. Neck supple. No rigidity. No JVD present. · Cardiovascular: Normal rate, regular rhythm, S1&S2 and intact distal pulses. · Pulmonary/Chest: Bilateral respiratory sounds. No wheezes. No rhonchi. · Abdominal: Soft. Bowel sounds present. No distension, No tenderness. · Musculoskeletal: No tenderness. No edema    · Lymphadenopathy: Has no cervical adenopathy. · Neurological: Alert and oriented. Cranial nerve appears intact, No Gross deficit   · Skin: Skin is warm and dry. No rash noted. · Psychiatric: Has a normal mood, affect and behavior     Labs:  Reviewed. EC2021 Atrial fibrillation with v-rate of 109 bpm with QRS duration 88 ms. No pathologic Q waves, ventricular pre-excitation, or QT prolongation. Studies:   1. Event monitor:   none    2. Echo: 3/27/2013  LVEF 50-55 %  The left ventricular wall motion is normal.  The left atrium is mildly dilated. Mild mitral regurgitation. Mild aortic regurgitation.   No pulmonic valve regurgitation. 3. Stress Test:  6/14/2021  Summary    Normal myocardial perfusion study.    Normal LV size and systolic function.    Patient in atrial fibrillation.    Overall findings represent a low risk study. 4. Cath:     I independently reviewed and interpreted the ECG, MCOT, echocardiogram, stress test, and coronary angiography/PCI results and used them for my plan of care. Procedures:  1. Assessment/Plan:     Persistent Atrial fibrillation and Atrial flutter   -EKG today shows atrial fibrillation with RVR, v-rate 109 bpm.  -First seen on EKG 3/25/2013 and every EKG in Epic shows afib /aflutter has persisted since that time.  -She has a [id number] Score 3 (HTN, DM, GENDER)  -On Eliquis 5 mg BID for  thromboembolic risk reduction.  -Tolerating well no signs or symptoms of abnormal bruising or bleeding. We educated the patient that atrial fibrillation and atrial flutter are both worsening and progressive diseases, with more frequent episodes that will ensue. Subsequent episodes usually become more sustained to the extent that many individuals would then develop persistent atrial fibrillation/atrial flutter. Once persistence is reached, permanent atrial dysrhythmia is inevitable. We also discussed the fact that atrial fibrillation and atrial flutter are associated with stroke, including life-threatening stroke, and therefore oral anticoagulation is warranted depending on the patient's [id number] score. We discussed different management options for atrial fibrillation and atrial flutter. These options include the use of cardioversion, anti-arrhythmic medication therapy, rate control strategy, oral anticoagulation, and atrial fibrillation and atrial flutter ablation. Risks, benefits and alternative of each treatment options were explained.  These options include use of cardioversion (mainly for persisting atrial fibrillation or atrial flutter) which provides an effective immediate therapy with success rates of 75% or higher, but it provides no short nor long term efficacy. Anti-arrhythmic medications provide a very effective short term therapy, but even with our most potent anti-arrhythmic medication there is limited long term efficacy (clinical studies have shown that 40% of patients remain atrial fibrillation-free after 4 years of follow-up after starting one of the more powerful anti-arrhythmic medication (amiodarone), and, if extrapolated, may have further diminishing success as time goes on). Against atrial flutter, any anti-arrhythmic medication would be nearly ineffective. Atrial fibrillation and atrial flutter ablation is a potentially curative therapy with very reasonable success rate after a first time procedure and with improving success rates with subsequent procedures. The risks, benefits and alternatives of the ablation procedure were discussed with the patient. The risks including, but not limited to, the risks of bleeding, infection, radiation exposure, injury to vascular, cardiac and surrounding structures (including pneumothorax), stroke, cardiac perforation, tamponade, need for emergent open heart surgery, need for pacemaker implantation, injury to the phrenic nerve, injury to the esophagus, myocardial infarction and death were discussed in detail. The patient was also counseled at length about the risks of melba Covid-19 in the lico-operative and post-operative states including the recovery window of their procedure. The patient was made aware that melba Covid-19 after a surgical procedure may worsen their prognosis for recovering from the virus and lend to a higher morbidity and or mortality risk. The patient was given the option of postponing their procedure. The patient was also presented reasonable alternatives to the proposed care, treatment, and services.  The discussion I have had with the patient encompassed risks, benefits, and side effects related to the alternatives and the risks related to not receiving the proposed care, treatment and services. I spent 40 minutes face to face with the patient, with greater than 50% of that time spent in counseling on the above. The patient wishes to proceed with both ablations, one for right atrial flutter and the other for persistent atrial fibrillation. We will also schedule the atrial fibrillation ablation and the right atrial flutter ablaiton with [facility] prior to. These procedure will occur at on the same day. The patient will need to hold the anti-arrhythmic medication for 7 days prior to. The patient will also hold the NOAC (novel oral anticoagulant) as per our protocol. A cardiac CTA prior to procedure for pulmonary vein mapping will be ordered. We will order BMP, CBC, PT/INR, and Type & Screen prior to the procedure. Much time was spent counseling the patient on healthier lifestyle, following a low sodium diet <2400 mg of sodium a day and dramatically decreasing the intake of processed sugar. Patient educated to eat a diet which includes organic fruits, vegetables and grass fed meats. Follow up three months after procedure with Kalyn Courtney CNP. Thank you for allowing me to participate in the care of Minerva Ramirez. All questions and concerns were addressed to the patient/family. Alternatives to my treatment were discussed. This note was scribed in the presence of Dr. Delmi Simms MD by Corinne Slick, RN. The scribe's documentation has been prepared under my direction and personally reviewed by me in its entirety. I confirm that the note above accurately reflects all work, physical examination, the discussion of treatments and procedures, and medical decision making performed by me.     Delmi Simms MD, MS, [facility] [place], [place] [address] Electrophysiology  [address], [address] [place] [address]  [phone]  [phone]

## 2021-08-23 ENCOUNTER — OFFICE VISIT (OUTPATIENT)
Dept: CARDIOLOGY CLINIC | Age: 61
End: 2021-08-23
Payer: COMMERCIAL

## 2021-08-23 VITALS
DIASTOLIC BLOOD PRESSURE: 68 MMHG | HEIGHT: 63 IN | SYSTOLIC BLOOD PRESSURE: 120 MMHG | HEART RATE: 102 BPM | WEIGHT: 157 LBS | OXYGEN SATURATION: 97 % | BODY MASS INDEX: 27.82 KG/M2

## 2021-08-23 DIAGNOSIS — I48.0 PAF (PAROXYSMAL ATRIAL FIBRILLATION) (HCC): Primary | ICD-10-CM

## 2021-08-23 PROCEDURE — 3017F COLORECTAL CA SCREEN DOC REV: CPT | Performed by: INTERNAL MEDICINE

## 2021-08-23 PROCEDURE — 1036F TOBACCO NON-USER: CPT | Performed by: INTERNAL MEDICINE

## 2021-08-23 PROCEDURE — 93000 ELECTROCARDIOGRAM COMPLETE: CPT | Performed by: INTERNAL MEDICINE

## 2021-08-23 PROCEDURE — G8419 CALC BMI OUT NRM PARAM NOF/U: HCPCS | Performed by: INTERNAL MEDICINE

## 2021-08-23 PROCEDURE — G8427 DOCREV CUR MEDS BY ELIG CLIN: HCPCS | Performed by: INTERNAL MEDICINE

## 2021-08-23 PROCEDURE — 99215 OFFICE O/P EST HI 40 MIN: CPT | Performed by: INTERNAL MEDICINE

## 2021-08-23 NOTE — PATIENT INSTRUCTIONS
Watch \"That Sugar Film\" online, which discusses the negative impact of processed sugar has on the body. If you have any question regarding your atrial fibrillation ablation or would like to proceed with scheduling please contact Dr. Alla Thacker Nurse Yvette Ortiz at [phone]. Atrial Fibrillation Ablation Pre procedure Instructions    As part of your pre procedure work up you will need to get a CT scan of your heart. This scan is completed in order to give Dr. Kyung Jain a map of the atrium (chamber of your heart) where he will be doing the ablation. CTA Pre procedure instructions      Date:_________________________________    Time:_________________________________    -Arrive 20 minutes prior to scheduled testing time  -Nothing to eat or drink for at least 4 hours prior to test    Atrial Fibrillation Ablation Pre procedure Instructions    Date: 10/21/2021     Arrive at: 6:30  am    Procedure time:7:30 am    The morning of your procedure you will park in the hospital parking lot and report directly to the cath lab to check in. At the information desk stay right and go all the way to the end of the tao, this will take you directly to your check in desk for the cath lab. Pre-Procedure Instructions   1. You will need to fast (nothing to eat or drink) for at least 8 hours prior to procedure. 2. You will need to hold your Xarelto for 12 hours prior to the procedure. 3. You will need to hold your Aspirin for 7 days prior to the procedure. 4. You will need to hold all diabetic medications including, glyBURIDE-metFORMIN  the morning of the procedure. If you take Lantus/Levemir only take ½ your normal dose the evening before. 5. Do not use any lotions, creams or perfume the morning of procedure. 6. You will need to complete pre-procedure lab work 5-7 days prior to your procedure. 7.  A COVID test will be completed upon arrival the day of your procedure.  If you have been fully vaccinated for COVID 19 at least 14 days prior to your procedure bring documentation of your vaccination with you the day of your procedure and you will NOT be required to get a COVID test.  8. Please have a responsible adult to drive you home after procedure, you should go home same day, but there is always a possibility of an overnight stay. 9. Cath lab will provide you with all post procedure instructions  10. A 3 month follow up will be scheduled with Dr. Daniel Ware Nurse practitioner Iban Morel, CNP post procedure      ________________________________________________________________________________________________    Patient Education        Learning About Atrial Fibrillation  What is atrial fibrillation? Atrial fibrillation (say \"AY-tree-eric yvl-zixb-PJD-shun\") is a common type of irregular heartbeat (arrhythmia). Normally, the heart beats in a strong, steady rhythm. In atrial fibrillation, a problem with the heart's electrical system causes the two upper chambers of the heart (called the atria) to quiver, or fibrillate. Atrial fibrillation can be dangerous. This is because if the heartbeat isn't strong and steady, blood can collect, or pool, in the atria. And pooled blood is more likely to form clots. Clots can travel to the brain, block blood flow, and cause a stroke. Atrial fibrillation can also lead to heart failure. This condition also upsets the normal rhythm between the atria and the lower chambers of the heart. (These chambers are called the ventricles.) The ventricles may beat fast and without a regular rhythm. What are the symptoms? Some people feel symptoms when they have episodes of atrial fibrillation. But other people don't notice any symptoms. If you have symptoms, you may feel:  · A fluttering, racing, or pounding feeling in your chest called palpitations. · Weak or tired. · Dizzy or lightheaded. · Short of breath. · Chest pain. · Confused. You may notice signs of atrial fibrillation when you check your pulse. Losing weight can help relieve symptoms. Manage other health problems. These problems include diabetes, high blood pressure, and high cholesterol. If you think you may have a problem with alcohol or drug use, talk to your doctor. Manage stress. Options like yoga, biofeedback, and meditation may help. Where can you learn more? Go to https://chpekathy.Rolltech. org and sign in to your WUT account. Enter T313 in the Bespoke box to learn more about \"Learning About Atrial Fibrillation. \"     If you do not have an account, please click on the \"Sign Up Now\" link. Current as of: August 31, 2020               Content Version: 12.9  © 2006-2021 [employer]. Care instructions adapted under license by [facility] ([facility]). If you have questions about a medical condition or this instruction, always ask your healthcare professional. [place] [address] any warranty or liability for your use of this information. Patient Education        Atrial Fibrillation: Care Instructions  Your Care Instructions     Atrial fibrillation is an irregular and often fast heartbeat. Treating this condition is important for several reasons. It can cause blood clots, which can travel from your heart to your brain and cause a stroke. If you have a fast heartbeat, you may feel lightheaded, dizzy, and weak. An irregular heartbeat can also increase your risk for heart failure. Atrial fibrillation is often the result of another heart condition, such as high blood pressure or coronary artery disease. Making changes to improve your heart condition will help you stay healthy and active. Follow-up care is a key part of your treatment and safety. Be sure to make and go to all appointments, and call your doctor if you are having problems. It's also a good idea to know your test results and keep a list of the medicines you take. How can you care for yourself at home?   Medicines    · Take your medicines exactly as prescribed. Call your doctor if you think you are having a problem with your medicine. You will get more details on the specific medicines your doctor prescribes.     · If your doctor has given you a blood thinner to prevent a stroke, be sure you get instructions about how to take your medicine safely. Blood thinners can cause serious bleeding problems.     · Do not take any vitamins, over-the-counter drugs, or herbal products without talking to your doctor first.   Lifestyle changes    · Do not smoke. Smoking can increase your chance of a stroke and heart attack. If you need help quitting, talk to your doctor about stop-smoking programs and medicines. These can increase your chances of quitting for good.     · Eat a heart-healthy diet.     · Stay at a healthy weight. Lose weight if you need to.     · Limit alcohol to 2 drinks a day for men and 1 drink a day for women. Too much alcohol can cause health problems.     · Avoid colds and flu. Get a pneumococcal vaccine shot. If you have had one before, ask your doctor whether you need another dose. Get a flu shot every year. If you must be around people with colds or flu, wash your hands often. Activity    · If your doctor recommends it, get more exercise. Walking is a good choice. Bit by bit, increase the amount you walk every day. Try for at least 30 minutes on most days of the week. You also may want to swim, bike, or do other activities. Your doctor may suggest that you join a cardiac rehabilitation program so that you can have help increasing your physical activity safely.     · Start light exercise if your doctor says it is okay. Even a small amount will help you get stronger, have more energy, and manage stress. Walking is an easy way to get exercise. Start out by walking a little more than you did in the hospital. Gradually increase the amount you walk.     · When you exercise, watch for signs that your heart is working too hard.  You are pushing too hard if you cannot talk while you are exercising. If you become short of breath or dizzy or have chest pain, sit down and rest immediately.     · Check your pulse regularly. Place two fingers on the artery at the palm side of your wrist, in line with your thumb. If your heartbeat seems uneven or fast, talk to your doctor. When should you call for help? Call 911 anytime you think you may need emergency care. For example, call if:    · You have symptoms of a heart attack. These may include:  ? Chest pain or pressure, or a strange feeling in the chest.  ? Sweating. ? Shortness of breath. ? Nausea or vomiting. ? Pain, pressure, or a strange feeling in the back, neck, jaw, or upper belly or in one or both shoulders or arms. ? Lightheadedness or sudden weakness. ? A fast or irregular heartbeat. After you call 911, the  may tell you to chew 1 adult-strength or 2 to 4 low-dose aspirin. Wait for an ambulance. Do not try to drive yourself.     · You have symptoms of a stroke. These may include:  ? Sudden numbness, tingling, weakness, or loss of movement in your face, arm, or leg, especially on only one side of your body. ? Sudden vision changes. ? Sudden trouble speaking. ? Sudden confusion or trouble understanding simple statements. ? Sudden problems with walking or balance. ? A sudden, severe headache that is different from past headaches.     · You passed out (lost consciousness). Call your doctor now or seek immediate medical care if:    · You have new or increased shortness of breath.     · You feel dizzy or lightheaded, or you feel like you may faint.     · Your heart rate becomes irregular.     · You can feel your heart flutter in your chest or skip heartbeats. Tell your doctor if these symptoms are new or worse. Watch closely for changes in your health, and be sure to contact your doctor if you have any problems. Where can you learn more?   Go to https://[email or web address] org and sign in to your [employer] account. Enter U020 in the [facility] box to learn more about \"Atrial Fibrillation: Care Instructions. \"     If you do not have an account, please click on the \"Sign Up Now\" link. Current as of: August 31, 2020               Content Version: 12.9  © 2006-2021 [employer]. Care instructions adapted under license by [facility] ([facility]). If you have questions about a medical condition or this instruction, always ask your healthcare professional. [place] [address] any warranty or liability for your use of this information. Patient Education        Learning About Catheter Ablation for Heart Rhythm Problems  What is catheter ablation? Catheter ablation is a procedure that treats heart rhythm problems. These problems include atrial fibrillation, supraventricular tachycardia (SVT), atrial flutter, and ventricular tachycardia. Your heart should have a strong, steady beat. That beat is controlled by the heart's electrical system. Sometimes that system misfires. This causes a heartbeat that is too fast and isn't steady. Catheter ablation is a way to get into your heart and fix the problem. Ablation is not surgery. How is catheter ablation done? Your doctor inserts thin tubes called catheters into a blood vessel in your groin, arm, or neck. Then your doctor feeds them into the heart. Wires in the catheters help the doctor find the problem areas. Then the doctor uses the wires to send energy to destroy the tiny areas of heart tissue that are causing the problems. It may seem like a bad idea to destroy parts of your heart on purpose. But the areas that are destroyed are very tiny. They should not affect your heart's ability to do its job. You may be awake during the procedure. Or you may be asleep. The doctor will give you medicines to help you feel relaxed and to numb the areas where the catheters go in. You may feel a little uncomfortable, but you should not feel pain. What can you expect after catheter ablation? You may stay overnight in the hospital. How long you stay in the hospital depends on the type of ablation you have. Do not exercise hard or lift anything heavy for a week. You will probably be able to go back to work and to your normal routine in 1 or 2 days. You may have swelling, bruising, or a small lump around the site where the catheters went into your body. These should go away in 3 to 4 weeks. You may have to take some medicines for a while. Follow-up care is a key part of your treatment and safety. Be sure to make and go to all appointments, and call your doctor if you are having problems. It's also a good idea to know your test results and keep a list of the medicines you take. Where can you learn more? Go to https://[email or web address].compeishmaelewMineSense Technologies.[email or web address] org and sign in to your [email or web address] account. Enter H697 in the Intiza box to learn more about \"Learning About Catheter Ablation for Heart Rhythm Problems. \"     If you do not have an account, please click on the \"Sign Up Now\" link. Current as of: August 31, 2020               Content Version: 12.9  © 2006-2021 [employer]. Care instructions adapted under license by [facility] ([facility]). If you have questions about a medical condition or this instruction, always ask your healthcare professional. [place] [address] any warranty or liability for your use of this information. Patient Education        Electrophysiology Study and Catheter Ablation: Before Your Procedure  What is an electrophysiology study and catheter ablation? An electrophysiology study is a test to see if there is a problem with your heart rhythm and to find out how to fix it. It is also called an EP study. A catheter ablation procedure is sometimes done at the same time.  This procedure destroys (ablates) small areas of your heart that are causing your heart rhythm problem. The doctor puts plastic tubes called catheters into blood vessels in your groin, arm, or neck. He or she then uses an X-ray machine to guide long wires through the tubes to your heart. The doctor can use these wires to record your heart's electrical signals. If the doctor thinks your problem can be fixed with ablation, he or she can use the wires to destroy a small part of your heart tissue. This is most often done with radio waves. You will probably be awake during the procedure. But you might be asleep. The doctor will give you medicines to help you feel relaxed and to numb the areas where the catheters go in. An EP study and ablation can take 2 to 6 hours. In rare cases, it can take longer. If you have an EP study only and you don't need more treatment, you may go home the same day. But if you also have ablation, you may stay overnight in the hospital. How long you stay in the hospital depends on the type of ablation you have. Do not exercise hard or lift anything heavy for a week. You may be able to go back to work and to your normal routine in 1 or 2 days. Follow-up care is a key part of your treatment and safety. Be sure to make and go to all appointments, and call your doctor if you are having problems. It's also a good idea to know your test results and keep a list of the medicines you take. How do you prepare for the procedure? Procedures can be stressful. This information will help you understand what you can expect. And it will help you safely prepare for your procedure. Preparing for the procedure    · Be sure you have someone to take you home. Anesthesia and pain medicine will make it unsafe for you to drive or get home on your own.     · Understand exactly what procedure is planned, along with the risks, benefits, and other options.     · Tell your doctor ALL the medicines, vitamins, supplements, and herbal remedies you take.  Some may increase the risk of problems during your procedure. Your doctor will tell you if you should stop taking any of them before the procedure and how soon to do it.     · If you take aspirin or some other blood thinner, ask your doctor if you should stop taking it before your procedure. Make sure that you understand exactly what your doctor wants you to do. These medicines increase the risk of bleeding.     · Make sure your doctor and the hospital have a copy of your advance directive. If you don't have one, you may want to prepare one. It lets others know your health care wishes. It's a good thing to have before any type of surgery or procedure. What happens on the day of the procedure? · Follow the instructions exactly about when to stop eating and drinking. If you don't, your procedure may be canceled. If your doctor told you to take your medicines on the day of the procedure, take them with only a sip of water.     · Take a bath or shower before you come in for your procedure. Do not apply lotions, perfumes, deodorants, or nail polish.     · Take off all jewelry and piercings. And take out contact lenses, if you wear them. At the hospital or surgery center   · Bring a picture ID.     · You will be kept comfortable and safe by your anesthesia provider. The anesthesia may make you sleep. Or it may just numb the area being worked on.     · This procedure can take 2 to 6 hours. In rare cases, it can take longer.     · After the procedure, pressure will be applied to the area where the catheter was put in your blood vessel. Then the area may be covered with a bandage or a compression device. This will prevent bleeding.     · Nurses will check your heart rate and blood pressure. The nurse will also check the catheter site for bleeding.     · If the catheter was put in your groin, you will need to lie still and keep your leg straight for several hours.  The nurse may put a weighted bag on your leg to keep it still.     · If the catheter was put in your arm, you may be able to sit up and get out of bed right away. But you will need to keep your arm still for at least 1 hour.     · You may have a bruise or a small lump where the catheter was put in your blood vessel. This is normal and will go away. When should you call your doctor? · You have questions or concerns.     · You don't understand how to prepare for your procedure.     · You become ill before the procedure (such as fever, flu, or a cold).     · You need to reschedule or have changed your mind about having the procedure. Where can you learn more? Go to https://StartDate Labs.Utopia. org and sign in to your [employer] account. Enter D998 in the PraXcell box to learn more about \"Electrophysiology Study and Catheter Ablation: Before Your Procedure. \"     If you do not have an account, please click on the \"Sign Up Now\" link. Current as of: August 31, 2020               Content Version: 12.9  © 2006-2021 [employer]. Care instructions adapted under license by [facility] ([facility]). If you have questions about a medical condition or this instruction, always ask your healthcare professional. [place] [address] any warranty or liability for your use of this information. Patient Education        Electrophysiology Study and Catheter Ablation: What to Expect at [address] [place] [address] Drive had an electrophysiology study for a problem with your heartbeat. You may also have had a catheter ablation to try to correct the problem. You may have swelling, bruising, or a small lump around the site where the catheters went into your body. These should go away in 3 to 4 weeks. Do not exercise hard or lift anything heavy for a week. You may be able to go back to work and to your normal routine in 1 or 2 days. This care sheet gives you a general idea about how long it will take for you to recover. But each person recovers at a different pace.  Follow the · If you are bleeding, lie down and press on the area for 15 minutes to try to make it stop. If the bleeding does not stop, call your doctor or seek immediate medical care. Follow-up care is a key part of your treatment and safety. Be sure to make and go to all appointments, and call your doctor if you are having problems. It's also a good idea to know your test results and keep a list of the medicines you take. When should you call for help? Call 911  anytime you think you may need emergency care. For example, call if:    · You passed out (lost consciousness).     · You have symptoms of a heart attack. These may include:  ? Chest pain or pressure, or a strange feeling in the chest.  ? Sweating. ? Shortness of breath. ? Nausea or vomiting. ? Pain, pressure, or a strange feeling in the back, neck, jaw, or upper belly, or in one or both shoulders or arms. ? Lightheadedness or sudden weakness. ? A fast or irregular heartbeat. After you call 911, the  may tell you to chew 1 adult-strength or 2 to 4 low-dose aspirin. Wait for an ambulance. Do not try to drive yourself.     · You have symptoms of a stroke. These may include:  ? Sudden numbness, tingling, weakness, or loss of movement in your face, arm, or leg, especially on only one side of your body. ? Sudden vision changes. ? Sudden trouble speaking. ? Sudden confusion or trouble understanding simple statements. ? Sudden problems with walking or balance. ? A sudden, severe headache that is different from past headaches. Call your doctor now or seek immediate medical care if:    · You are bleeding from the area where the catheter was put in your blood vessel.     · You have a fast-growing, painful lump at the catheter site.     · You have signs of infection, such as:  ? Increased pain, swelling, warmth, or redness. ? Red streaks leading from the catheter site. ? Pus draining from the catheter site.   ? A fever.     · Your leg, arm, or hand is

## 2021-09-03 ENCOUNTER — HOSPITAL ENCOUNTER (OUTPATIENT)
Dept: CT IMAGING | Age: 61
Discharge: HOME | End: 2021-09-03
Payer: COMMERCIAL

## 2021-09-03 DIAGNOSIS — I48.0 PAF (PAROXYSMAL ATRIAL FIBRILLATION) (HCC): ICD-10-CM

## 2021-09-03 LAB
GFR AFRICAN AMERICAN: >60
GFR NON-AFRICAN AMERICAN: 56
PERFORMED ON: ABNORMAL
POC CREATININE: 1 MG/DL (ref 0.6–1.2)
POC SAMPLE TYPE: ABNORMAL

## 2021-09-03 PROCEDURE — 6360000004 HC RX CONTRAST MEDICATION: Performed by: INTERNAL MEDICINE

## 2021-09-03 PROCEDURE — 75574 CT ANGIO HRT W/3D IMAGE: CPT

## 2021-09-03 PROCEDURE — 82565 ASSAY OF CREATININE: CPT

## 2021-09-03 RX ADMIN — IOPAMIDOL 75 ML: 755 INJECTION, SOLUTION INTRAVENOUS at 10:10

## 2021-09-21 ENCOUNTER — TELEPHONE (OUTPATIENT)
Dept: INTERNAL MEDICINE CLINIC | Age: 61
End: 2021-09-21

## 2021-09-21 DIAGNOSIS — E11.9 TYPE 2 DIABETES MELLITUS WITHOUT COMPLICATION, WITHOUT LONG-TERM CURRENT USE OF INSULIN (HCC): ICD-10-CM

## 2021-09-21 RX ORDER — GLYBURIDE-METFORMIN HYDROCHLORIDE 5; 500 MG/1; MG/1
TABLET ORAL
Qty: 180 TABLET | Refills: 0 | Status: SHIPPED | OUTPATIENT
Start: 2021-09-21 | End: 2021-11-29 | Stop reason: SDUPTHER

## 2021-09-21 NOTE — TELEPHONE ENCOUNTER
Script written  She need to be seen after she comes back for her diabetic checks-make appt for her please.

## 2021-09-21 NOTE — TELEPHONE ENCOUNTER
Pt would like a refill of the glyBURIDE-metFORMIN (GLUCOVANCE) 5-500 MG per tablet  To the pharmacy [address] [place] [phone]   [address] [place] [address], [address] [place] [address]   Phone:  [phone]  Fax:  [phone]    Pt is leaving [facility] Thursday and would like to have this before she leaves.

## 2021-10-04 DIAGNOSIS — I48.91 ATRIAL FIBRILLATION, UNSPECIFIED TYPE (HCC): ICD-10-CM

## 2021-10-04 NOTE — TELEPHONE ENCOUNTER
Last office visit :06/11/2021    Future Appointments   Date Time Provider Mitra Zapata   10/28/2021 11:00 AM Key Schmitt MD [facility]

## 2021-10-05 RX ORDER — METOPROLOL TARTRATE 50 MG/1
50 TABLET, FILM COATED ORAL 2 TIMES DAILY
Qty: 120 TABLET | Refills: 1 | Status: SHIPPED
Start: 2021-10-05 | End: 2021-10-21 | Stop reason: HOSPADM

## 2021-10-06 ENCOUNTER — TELEPHONE (OUTPATIENT)
Dept: CARDIOLOGY CLINIC | Age: 61
End: 2021-10-06

## 2021-10-06 DIAGNOSIS — I48.0 PAF (PAROXYSMAL ATRIAL FIBRILLATION) (HCC): Primary | ICD-10-CM

## 2021-10-06 NOTE — TELEPHONE ENCOUNTER
Patient is scheduled for atrial fibrillation & atrial flutter ablation on  10/21/202. Please contact patient and notify that we are now requiring a COVID test prior to her procedure (regardless of your vaccination status). No earlier than 6 days prior to her procedure. You can get you COVID test at the [facility] (SEE BELOW)     [facility] also  offer these at no cost. Please contact the location in which you would like to get the COVID testing completed to make sure it is one of the participating location. If she does the COVID test at [facility] or [facility] she will need to bring the results of the testing with her the day of the procedure. [address] [facility]  [address] [place] [address]  Phone: [phone]  The hours are Mon -Fri.  7:00 am - 5:00 pm   No appointment necessary    You may complete pre procedure labs & COVID test at this location. Between hours of 12:30 pm & 4:00 pm      There are parking spaces behind the building designated for COVID test where you can pull up and call number listed on the pole and they will come out and complete covid test while you are in your car.

## 2021-10-07 NOTE — TELEPHONE ENCOUNTER
Please contact patient and advise that we can use the test from her employer so long as it is completed no earlier than 6 days prior to her procedure and she bring the results of the testing with her the day of the procedure.

## 2021-10-07 NOTE — TELEPHONE ENCOUNTER
Patient returned call and was informed of message below. Patient states her job provides a covid test every 2 weeks. It is one that she does herself and sends it back she does not think it is a PCR one. Patient wanted to know if she can just use that or if she can get her lab work and covid test done at the same time.

## 2021-10-15 ENCOUNTER — HOSPITAL ENCOUNTER (OUTPATIENT)
Age: 61
Discharge: HOME | End: 2021-10-15
Payer: COMMERCIAL

## 2021-10-15 DIAGNOSIS — I48.0 PAF (PAROXYSMAL ATRIAL FIBRILLATION) (HCC): ICD-10-CM

## 2021-10-15 LAB
ABO/RH: NORMAL
ANION GAP SERPL CALCULATED.3IONS-SCNC: 15 MMOL/L (ref 3–16)
ANTIBODY SCREEN: NORMAL
BUN BLDV-MCNC: 25 MG/DL (ref 7–20)
CALCIUM SERPL-MCNC: 10.5 MG/DL (ref 8.3–10.6)
CHLORIDE BLD-SCNC: 104 MMOL/L (ref 99–110)
CO2: 21 MMOL/L (ref 21–32)
CREAT SERPL-MCNC: 0.8 MG/DL (ref 0.6–1.2)
GFR AFRICAN AMERICAN: >60
GFR NON-AFRICAN AMERICAN: >60
GLUCOSE BLD-MCNC: 97 MG/DL (ref 70–99)
HCT VFR BLD CALC: 39.2 % (ref 36–48)
HEMOGLOBIN: 13.2 G/DL (ref 12–16)
MCH RBC QN AUTO: 32.9 PG (ref 26–34)
MCHC RBC AUTO-ENTMCNC: 33.8 G/DL (ref 31–36)
MCV RBC AUTO: 97.4 FL (ref 80–100)
PDW BLD-RTO: 12.7 % (ref 12.4–15.4)
PLATELET # BLD: 296 K/UL (ref 135–450)
PMV BLD AUTO: 9.2 FL (ref 5–10.5)
POTASSIUM SERPL-SCNC: 4 MMOL/L (ref 3.5–5.1)
RBC # BLD: 4.02 M/UL (ref 4–5.2)
SARS-COV-2, NAAT: NOT DETECTED
SODIUM BLD-SCNC: 140 MMOL/L (ref 136–145)
WBC # BLD: 7.4 K/UL (ref 4–11)

## 2021-10-15 PROCEDURE — 87635 SARS-COV-2 COVID-19 AMP PRB: CPT

## 2021-10-15 PROCEDURE — 86850 RBC ANTIBODY SCREEN: CPT

## 2021-10-15 PROCEDURE — 85027 COMPLETE CBC AUTOMATED: CPT

## 2021-10-15 PROCEDURE — 86901 BLOOD TYPING SEROLOGIC RH(D): CPT

## 2021-10-15 PROCEDURE — 36415 COLL VENOUS BLD VENIPUNCTURE: CPT

## 2021-10-15 PROCEDURE — 86900 BLOOD TYPING SEROLOGIC ABO: CPT

## 2021-10-15 PROCEDURE — 80048 BASIC METABOLIC PNL TOTAL CA: CPT

## 2021-10-21 ENCOUNTER — HOSPITAL ENCOUNTER (OUTPATIENT)
Dept: CARDIAC CATH/INVASIVE PROCEDURES | Age: 61
Discharge: HOME | End: 2021-10-21
Payer: COMMERCIAL

## 2021-10-21 ENCOUNTER — ANESTHESIA EVENT (OUTPATIENT)
Dept: CARDIAC CATH/INVASIVE PROCEDURES | Age: 61
End: 2021-10-21

## 2021-10-21 ENCOUNTER — ANESTHESIA (OUTPATIENT)
Dept: CARDIAC CATH/INVASIVE PROCEDURES | Age: 61
End: 2021-10-21

## 2021-10-21 VITALS
RESPIRATION RATE: 16 BRPM | WEIGHT: 157 LBS | DIASTOLIC BLOOD PRESSURE: 63 MMHG | TEMPERATURE: 97.8 F | HEART RATE: 65 BPM | SYSTOLIC BLOOD PRESSURE: 94 MMHG | OXYGEN SATURATION: 88 % | BODY MASS INDEX: 27.82 KG/M2 | HEIGHT: 63 IN

## 2021-10-21 VITALS
TEMPERATURE: 97.2 F | OXYGEN SATURATION: 95 % | DIASTOLIC BLOOD PRESSURE: 62 MMHG | RESPIRATION RATE: 1 BRPM | SYSTOLIC BLOOD PRESSURE: 111 MMHG

## 2021-10-21 DIAGNOSIS — I48.92 LEFT ATRIAL FLUTTER BY ELECTROCARDIOGRAM (HCC): ICD-10-CM

## 2021-10-21 DIAGNOSIS — I48.92 RIGHT ATRIAL FLUTTER BY ELECTROCARDIOGRAM (HCC): ICD-10-CM

## 2021-10-21 LAB
ABO/RH: NORMAL
ANTIBODY SCREEN: NORMAL
EKG ATRIAL RATE: 64 BPM
EKG DIAGNOSIS: NORMAL
EKG P AXIS: 56 DEGREES
EKG P-R INTERVAL: 156 MS
EKG Q-T INTERVAL: 512 MS
EKG QRS DURATION: 80 MS
EKG QTC CALCULATION (BAZETT): 528 MS
EKG R AXIS: -1 DEGREES
EKG T AXIS: 15 DEGREES
EKG VENTRICULAR RATE: 64 BPM
GLUCOSE BLD-MCNC: 294 MG/DL (ref 70–99)
PERFORMED ON: ABNORMAL
POC ACT LR: 287 SEC
POC ACT LR: 319 SEC
POC ACT LR: 330 SEC
SARS-COV-2, NAAT: NOT DETECTED

## 2021-10-21 PROCEDURE — 3700000000 HC ANESTHESIA ATTENDED CARE

## 2021-10-21 PROCEDURE — 93656 COMPRE EP EVAL ABLTJ ATR FIB: CPT | Performed by: INTERNAL MEDICINE

## 2021-10-21 PROCEDURE — 86901 BLOOD TYPING SEROLOGIC RH(D): CPT

## 2021-10-21 PROCEDURE — 2720000010 HC SURG SUPPLY STERILE

## 2021-10-21 PROCEDURE — 85347 COAGULATION TIME ACTIVATED: CPT

## 2021-10-21 PROCEDURE — 93662 INTRACARDIAC ECG (ICE): CPT | Performed by: FAMILY MEDICINE

## 2021-10-21 PROCEDURE — 93613 INTRACARDIAC EPHYS 3D MAPG: CPT | Performed by: INTERNAL MEDICINE

## 2021-10-21 PROCEDURE — 3700000001 HC ADD 15 MINUTES (ANESTHESIA)

## 2021-10-21 PROCEDURE — C1732 CATH, EP, DIAG/ABL, 3D/VECT: HCPCS

## 2021-10-21 PROCEDURE — 87635 SARS-COV-2 COVID-19 AMP PRB: CPT

## 2021-10-21 PROCEDURE — 2580000003 HC RX 258: Performed by: NURSE ANESTHETIST, CERTIFIED REGISTERED

## 2021-10-21 PROCEDURE — 7100000001 HC PACU RECOVERY - ADDTL 15 MIN

## 2021-10-21 PROCEDURE — 93662 INTRACARDIAC ECG (ICE): CPT | Performed by: INTERNAL MEDICINE

## 2021-10-21 PROCEDURE — 93623 PRGRMD STIMJ&PACG IV RX NFS: CPT | Performed by: INTERNAL MEDICINE

## 2021-10-21 PROCEDURE — 93325 DOPPLER ECHO COLOR FLOW MAPG: CPT | Performed by: FAMILY MEDICINE

## 2021-10-21 PROCEDURE — 93656 COMPRE EP EVAL ABLTJ ATR FIB: CPT | Performed by: FAMILY MEDICINE

## 2021-10-21 PROCEDURE — 2580000003 HC RX 258

## 2021-10-21 PROCEDURE — 93623 PRGRMD STIMJ&PACG IV RX NFS: CPT | Performed by: FAMILY MEDICINE

## 2021-10-21 PROCEDURE — C1894 INTRO/SHEATH, NON-LASER: HCPCS

## 2021-10-21 PROCEDURE — 6360000002 HC RX W HCPCS

## 2021-10-21 PROCEDURE — 93655 ICAR CATH ABLTJ DSCRT ARRHYT: CPT | Performed by: FAMILY MEDICINE

## 2021-10-21 PROCEDURE — 93312 ECHO TRANSESOPHAGEAL: CPT | Performed by: FAMILY MEDICINE

## 2021-10-21 PROCEDURE — 93320 DOPPLER ECHO COMPLETE: CPT | Performed by: FAMILY MEDICINE

## 2021-10-21 PROCEDURE — 93657 TX L/R ATRIAL FIB ADDL: CPT | Performed by: INTERNAL MEDICINE

## 2021-10-21 PROCEDURE — 93010 ELECTROCARDIOGRAM REPORT: CPT | Performed by: INTERNAL MEDICINE

## 2021-10-21 PROCEDURE — 93622 COMP EP EVAL L VENTR PAC&REC: CPT | Performed by: FAMILY MEDICINE

## 2021-10-21 PROCEDURE — 93655 ICAR CATH ABLTJ DSCRT ARRHYT: CPT | Performed by: INTERNAL MEDICINE

## 2021-10-21 PROCEDURE — C1730 CATH, EP, 19 OR FEW ELECT: HCPCS

## 2021-10-21 PROCEDURE — 2500000003 HC RX 250 WO HCPCS: Performed by: NURSE ANESTHETIST, CERTIFIED REGISTERED

## 2021-10-21 PROCEDURE — 86850 RBC ANTIBODY SCREEN: CPT

## 2021-10-21 PROCEDURE — 93613 INTRACARDIAC EPHYS 3D MAPG: CPT | Performed by: FAMILY MEDICINE

## 2021-10-21 PROCEDURE — 86900 BLOOD TYPING SEROLOGIC ABO: CPT

## 2021-10-21 PROCEDURE — 2709999900 HC NON-CHARGEABLE SUPPLY

## 2021-10-21 PROCEDURE — C1759 CATH, INTRA ECHOCARDIOGRAPHY: HCPCS

## 2021-10-21 PROCEDURE — 93005 ELECTROCARDIOGRAM TRACING: CPT | Performed by: INTERNAL MEDICINE

## 2021-10-21 PROCEDURE — C1893 INTRO/SHEATH, FIXED,NON-PEEL: HCPCS

## 2021-10-21 PROCEDURE — 93312 ECHO TRANSESOPHAGEAL: CPT | Performed by: INTERNAL MEDICINE

## 2021-10-21 PROCEDURE — 6360000002 HC RX W HCPCS: Performed by: NURSE ANESTHETIST, CERTIFIED REGISTERED

## 2021-10-21 PROCEDURE — 93657 TX L/R ATRIAL FIB ADDL: CPT | Performed by: FAMILY MEDICINE

## 2021-10-21 PROCEDURE — 7100000000 HC PACU RECOVERY - FIRST 15 MIN

## 2021-10-21 PROCEDURE — 93622 COMP EP EVAL L VENTR PAC&REC: CPT | Performed by: INTERNAL MEDICINE

## 2021-10-21 PROCEDURE — 2500000003 HC RX 250 WO HCPCS

## 2021-10-21 RX ORDER — PROTAMINE SULFATE 10 MG/ML
INJECTION, SOLUTION INTRAVENOUS PRN
Status: DISCONTINUED | OUTPATIENT
Start: 2021-10-21 | End: 2021-10-21 | Stop reason: SDUPTHER

## 2021-10-21 RX ORDER — LIDOCAINE HYDROCHLORIDE AND EPINEPHRINE BITARTRATE 20; .01 MG/ML; MG/ML
15 INJECTION, SOLUTION SUBCUTANEOUS SEE ADMIN INSTRUCTIONS
Status: DISCONTINUED | OUTPATIENT
Start: 2021-10-21 | End: 2021-10-22 | Stop reason: HOSPADM

## 2021-10-21 RX ORDER — FENTANYL CITRATE 50 UG/ML
INJECTION, SOLUTION INTRAMUSCULAR; INTRAVENOUS PRN
Status: DISCONTINUED | OUTPATIENT
Start: 2021-10-21 | End: 2021-10-21 | Stop reason: SDUPTHER

## 2021-10-21 RX ORDER — DEXAMETHASONE SODIUM PHOSPHATE 4 MG/ML
INJECTION, SOLUTION INTRA-ARTICULAR; INTRALESIONAL; INTRAMUSCULAR; INTRAVENOUS; SOFT TISSUE PRN
Status: DISCONTINUED | OUTPATIENT
Start: 2021-10-21 | End: 2021-10-21 | Stop reason: SDUPTHER

## 2021-10-21 RX ORDER — SODIUM CHLORIDE 0.9 % (FLUSH) 0.9 %
5-40 SYRINGE (ML) INJECTION EVERY 12 HOURS SCHEDULED
Status: DISCONTINUED | OUTPATIENT
Start: 2021-10-21 | End: 2021-10-22 | Stop reason: HOSPADM

## 2021-10-21 RX ORDER — ONDANSETRON 2 MG/ML
INJECTION INTRAMUSCULAR; INTRAVENOUS PRN
Status: DISCONTINUED | OUTPATIENT
Start: 2021-10-21 | End: 2021-10-21 | Stop reason: SDUPTHER

## 2021-10-21 RX ORDER — SUCCINYLCHOLINE/SOD CL,ISO/PF 200MG/10ML
SYRINGE (ML) INTRAVENOUS PRN
Status: DISCONTINUED | OUTPATIENT
Start: 2021-10-21 | End: 2021-10-21 | Stop reason: SDUPTHER

## 2021-10-21 RX ORDER — DOBUTAMINE HYDROCHLORIDE 200 MG/100ML
INJECTION INTRAVENOUS CONTINUOUS PRN
Status: DISCONTINUED | OUTPATIENT
Start: 2021-10-21 | End: 2021-10-21 | Stop reason: SDUPTHER

## 2021-10-21 RX ORDER — FUROSEMIDE 10 MG/ML
40 INJECTION INTRAMUSCULAR; INTRAVENOUS ONCE
Status: DISCONTINUED | OUTPATIENT
Start: 2021-10-21 | End: 2021-10-22 | Stop reason: HOSPADM

## 2021-10-21 RX ORDER — VALSARTAN AND HYDROCHLOROTHIAZIDE 160; 12.5 MG/1; MG/1
1 TABLET, FILM COATED ORAL DAILY
Status: DISCONTINUED | OUTPATIENT
Start: 2021-10-21 | End: 2021-10-22 | Stop reason: HOSPADM

## 2021-10-21 RX ORDER — ACETAMINOPHEN 325 MG/1
650 TABLET ORAL EVERY 4 HOURS PRN
Status: DISCONTINUED | OUTPATIENT
Start: 2021-10-21 | End: 2021-10-22 | Stop reason: HOSPADM

## 2021-10-21 RX ORDER — SODIUM CHLORIDE 9 MG/ML
25 INJECTION, SOLUTION INTRAVENOUS PRN
Status: DISCONTINUED | OUTPATIENT
Start: 2021-10-21 | End: 2021-10-22 | Stop reason: HOSPADM

## 2021-10-21 RX ORDER — HEPARIN SODIUM 10000 [USP'U]/100ML
INJECTION, SOLUTION INTRAVENOUS CONTINUOUS PRN
Status: DISCONTINUED | OUTPATIENT
Start: 2021-10-21 | End: 2021-10-21 | Stop reason: SDUPTHER

## 2021-10-21 RX ORDER — ATORVASTATIN CALCIUM 40 MG/1
40 TABLET, FILM COATED ORAL NIGHTLY
Status: DISCONTINUED | OUTPATIENT
Start: 2021-10-21 | End: 2021-10-22 | Stop reason: HOSPADM

## 2021-10-21 RX ORDER — PROTAMINE SULFATE 10 MG/ML
30 INJECTION, SOLUTION INTRAVENOUS
Status: ACTIVE | OUTPATIENT
Start: 2021-10-21 | End: 2021-10-21

## 2021-10-21 RX ORDER — SODIUM CHLORIDE 0.9 % (FLUSH) 0.9 %
5-40 SYRINGE (ML) INJECTION PRN
Status: DISCONTINUED | OUTPATIENT
Start: 2021-10-21 | End: 2021-10-22 | Stop reason: HOSPADM

## 2021-10-21 RX ORDER — SODIUM CHLORIDE 9 MG/ML
INJECTION INTRAVENOUS PRN
Status: DISCONTINUED | OUTPATIENT
Start: 2021-10-21 | End: 2021-10-21 | Stop reason: SDUPTHER

## 2021-10-21 RX ORDER — SODIUM CHLORIDE 9 MG/ML
INJECTION, SOLUTION INTRAVENOUS CONTINUOUS PRN
Status: DISCONTINUED | OUTPATIENT
Start: 2021-10-21 | End: 2021-10-21 | Stop reason: SDUPTHER

## 2021-10-21 RX ORDER — FLECAINIDE ACETATE 100 MG/1
50 TABLET ORAL EVERY 12 HOURS SCHEDULED
Status: DISCONTINUED | OUTPATIENT
Start: 2021-10-21 | End: 2021-10-22 | Stop reason: HOSPADM

## 2021-10-21 RX ORDER — MEPERIDINE HYDROCHLORIDE 25 MG/ML
12.5 INJECTION INTRAMUSCULAR; INTRAVENOUS; SUBCUTANEOUS EVERY 5 MIN PRN
Status: DISCONTINUED | OUTPATIENT
Start: 2021-10-21 | End: 2021-10-21

## 2021-10-21 RX ORDER — ONDANSETRON 2 MG/ML
4 INJECTION INTRAMUSCULAR; INTRAVENOUS
Status: DISCONTINUED | OUTPATIENT
Start: 2021-10-21 | End: 2021-10-21

## 2021-10-21 RX ORDER — LABETALOL HYDROCHLORIDE 5 MG/ML
5 INJECTION, SOLUTION INTRAVENOUS EVERY 10 MIN PRN
Status: DISCONTINUED | OUTPATIENT
Start: 2021-10-21 | End: 2021-10-21

## 2021-10-21 RX ORDER — LIDOCAINE HYDROCHLORIDE 10 MG/ML
INJECTION, SOLUTION EPIDURAL; INFILTRATION; INTRACAUDAL; PERINEURAL PRN
Status: DISCONTINUED | OUTPATIENT
Start: 2021-10-21 | End: 2021-10-21 | Stop reason: SDUPTHER

## 2021-10-21 RX ORDER — MORPHINE SULFATE 2 MG/ML
1 INJECTION, SOLUTION INTRAMUSCULAR; INTRAVENOUS EVERY 5 MIN PRN
Status: DISCONTINUED | OUTPATIENT
Start: 2021-10-21 | End: 2021-10-21

## 2021-10-21 RX ORDER — HYDRALAZINE HYDROCHLORIDE 20 MG/ML
5 INJECTION INTRAMUSCULAR; INTRAVENOUS
Status: DISCONTINUED | OUTPATIENT
Start: 2021-10-21 | End: 2021-10-21

## 2021-10-21 RX ORDER — OXYCODONE HYDROCHLORIDE AND ACETAMINOPHEN 5; 325 MG/1; MG/1
2 TABLET ORAL PRN
Status: DISCONTINUED | OUTPATIENT
Start: 2021-10-21 | End: 2021-10-21

## 2021-10-21 RX ORDER — GLYCOPYRROLATE 0.2 MG/ML
INJECTION INTRAMUSCULAR; INTRAVENOUS PRN
Status: DISCONTINUED | OUTPATIENT
Start: 2021-10-21 | End: 2021-10-21 | Stop reason: SDUPTHER

## 2021-10-21 RX ORDER — HEPARIN SODIUM 1000 [USP'U]/ML
INJECTION, SOLUTION INTRAVENOUS; SUBCUTANEOUS PRN
Status: DISCONTINUED | OUTPATIENT
Start: 2021-10-21 | End: 2021-10-21 | Stop reason: SDUPTHER

## 2021-10-21 RX ORDER — VECURONIUM BROMIDE 1 MG/ML
INJECTION, POWDER, LYOPHILIZED, FOR SOLUTION INTRAVENOUS PRN
Status: DISCONTINUED | OUTPATIENT
Start: 2021-10-21 | End: 2021-10-21 | Stop reason: SDUPTHER

## 2021-10-21 RX ORDER — PROPOFOL 10 MG/ML
INJECTION, EMULSION INTRAVENOUS PRN
Status: DISCONTINUED | OUTPATIENT
Start: 2021-10-21 | End: 2021-10-21 | Stop reason: SDUPTHER

## 2021-10-21 RX ORDER — 0.9 % SODIUM CHLORIDE 0.9 %
1000 INTRAVENOUS SOLUTION INTRAVENOUS
Status: ACTIVE | OUTPATIENT
Start: 2021-10-21 | End: 2021-10-21

## 2021-10-21 RX ORDER — METOPROLOL TARTRATE 50 MG/1
25 TABLET, FILM COATED ORAL 2 TIMES DAILY
Status: DISCONTINUED | OUTPATIENT
Start: 2021-10-21 | End: 2021-10-22 | Stop reason: HOSPADM

## 2021-10-21 RX ORDER — SODIUM CHLORIDE 9 MG/ML
INJECTION, SOLUTION INTRAVENOUS CONTINUOUS
Status: DISCONTINUED | OUTPATIENT
Start: 2021-10-21 | End: 2021-10-21

## 2021-10-21 RX ORDER — FLECAINIDE ACETATE 50 MG/1
50 TABLET ORAL EVERY 12 HOURS SCHEDULED
Qty: 60 TABLET | Refills: 3 | Status: SHIPPED
Start: 2021-10-21 | End: 2021-10-25 | Stop reason: ALTCHOICE

## 2021-10-21 RX ORDER — OXYCODONE HYDROCHLORIDE AND ACETAMINOPHEN 5; 325 MG/1; MG/1
1 TABLET ORAL PRN
Status: DISCONTINUED | OUTPATIENT
Start: 2021-10-21 | End: 2021-10-21

## 2021-10-21 RX ORDER — MIDAZOLAM HYDROCHLORIDE 1 MG/ML
INJECTION INTRAMUSCULAR; INTRAVENOUS PRN
Status: DISCONTINUED | OUTPATIENT
Start: 2021-10-21 | End: 2021-10-21 | Stop reason: SDUPTHER

## 2021-10-21 RX ORDER — GLYBURIDE-METFORMIN HYDROCHLORIDE 5; 500 MG/1; MG/1
1 TABLET ORAL 2 TIMES DAILY WITH MEALS
Status: DISCONTINUED | OUTPATIENT
Start: 2021-10-21 | End: 2021-10-22 | Stop reason: HOSPADM

## 2021-10-21 RX ORDER — MORPHINE SULFATE 2 MG/ML
2 INJECTION, SOLUTION INTRAMUSCULAR; INTRAVENOUS EVERY 5 MIN PRN
Status: DISCONTINUED | OUTPATIENT
Start: 2021-10-21 | End: 2021-10-21

## 2021-10-21 RX ADMIN — SODIUM CHLORIDE 10 ML: 9 INJECTION INTRAMUSCULAR; INTRAVENOUS; SUBCUTANEOUS at 08:01

## 2021-10-21 RX ADMIN — GLYCOPYRROLATE 0.2 MG: 0.2 INJECTION, SOLUTION INTRAMUSCULAR; INTRAVENOUS at 08:03

## 2021-10-21 RX ADMIN — PROTAMINE SULFATE 150 MG: 10 INJECTION, SOLUTION INTRAVENOUS at 10:27

## 2021-10-21 RX ADMIN — DEXAMETHASONE SODIUM PHOSPHATE 8 MG: 4 INJECTION, SOLUTION INTRAMUSCULAR; INTRAVENOUS at 08:03

## 2021-10-21 RX ADMIN — Medication 100 MG: at 07:57

## 2021-10-21 RX ADMIN — HEPARIN SODIUM 3500 UNITS: 1000 INJECTION INTRAVENOUS; SUBCUTANEOUS at 08:55

## 2021-10-21 RX ADMIN — VECURONIUM BROMIDE 3 MG: 1 INJECTION, POWDER, LYOPHILIZED, FOR SOLUTION INTRAVENOUS at 09:36

## 2021-10-21 RX ADMIN — SUGAMMADEX 200 MG: 100 INJECTION, SOLUTION INTRAVENOUS at 10:27

## 2021-10-21 RX ADMIN — PROPOFOL 100 MG: 10 INJECTION, EMULSION INTRAVENOUS at 07:57

## 2021-10-21 RX ADMIN — SODIUM CHLORIDE: 9 INJECTION, SOLUTION INTRAVENOUS at 08:03

## 2021-10-21 RX ADMIN — HEPARIN SODIUM 3500 UNITS: 1000 INJECTION INTRAVENOUS; SUBCUTANEOUS at 08:58

## 2021-10-21 RX ADMIN — SODIUM CHLORIDE: 9 INJECTION, SOLUTION INTRAVENOUS at 07:51

## 2021-10-21 RX ADMIN — HEPARIN SODIUM 7000 UNITS/HR: 10000 INJECTION, SOLUTION INTRAVENOUS at 09:18

## 2021-10-21 RX ADMIN — MIDAZOLAM 2 MG: 1 INJECTION INTRAMUSCULAR; INTRAVENOUS at 07:52

## 2021-10-21 RX ADMIN — VECURONIUM BROMIDE 10 MG: 1 INJECTION, POWDER, LYOPHILIZED, FOR SOLUTION INTRAVENOUS at 08:01

## 2021-10-21 RX ADMIN — LIDOCAINE HYDROCHLORIDE 30 MG: 10 INJECTION, SOLUTION EPIDURAL; INFILTRATION; INTRACAUDAL; PERINEURAL at 07:57

## 2021-10-21 RX ADMIN — ONDANSETRON 4 MG: 2 INJECTION INTRAMUSCULAR; INTRAVENOUS at 08:03

## 2021-10-21 RX ADMIN — DOBUTAMINE HYDROCHLORIDE 10 MCG/KG/MIN: 200 INJECTION INTRAVENOUS at 09:59

## 2021-10-21 RX ADMIN — HEPARIN SODIUM 2000 UNITS: 1000 INJECTION INTRAVENOUS; SUBCUTANEOUS at 09:18

## 2021-10-21 RX ADMIN — FENTANYL CITRATE 100 MCG: 50 INJECTION INTRAMUSCULAR; INTRAVENOUS at 07:53

## 2021-10-21 ASSESSMENT — PULMONARY FUNCTION TESTS
PIF_VALUE: 17
PIF_VALUE: 15
PIF_VALUE: 18
PIF_VALUE: 20
PIF_VALUE: 16
PIF_VALUE: 16
PIF_VALUE: 18
PIF_VALUE: 17
PIF_VALUE: 18
PIF_VALUE: 16
PIF_VALUE: 16
PIF_VALUE: 17
PIF_VALUE: 16
PIF_VALUE: 15
PIF_VALUE: 16
PIF_VALUE: 17
PIF_VALUE: 15
PIF_VALUE: 18
PIF_VALUE: 16
PIF_VALUE: 16
PIF_VALUE: 17
PIF_VALUE: 16
PIF_VALUE: 17
PIF_VALUE: 18
PIF_VALUE: 0
PIF_VALUE: 15
PIF_VALUE: 17
PIF_VALUE: 1
PIF_VALUE: 16
PIF_VALUE: 15
PIF_VALUE: 17
PIF_VALUE: 18
PIF_VALUE: 17
PIF_VALUE: 16
PIF_VALUE: 17
PIF_VALUE: 14
PIF_VALUE: 16
PIF_VALUE: 1
PIF_VALUE: 18
PIF_VALUE: 18
PIF_VALUE: 17
PIF_VALUE: 17
PIF_VALUE: 18
PIF_VALUE: 13
PIF_VALUE: 17
PIF_VALUE: 16
PIF_VALUE: 16
PIF_VALUE: 15
PIF_VALUE: 18
PIF_VALUE: 16
PIF_VALUE: 17
PIF_VALUE: 1
PIF_VALUE: 14
PIF_VALUE: 16
PIF_VALUE: 17
PIF_VALUE: 1
PIF_VALUE: 0
PIF_VALUE: 1
PIF_VALUE: 16
PIF_VALUE: 15
PIF_VALUE: 17
PIF_VALUE: 16
PIF_VALUE: 17
PIF_VALUE: 17
PIF_VALUE: 16
PIF_VALUE: 17
PIF_VALUE: 17
PIF_VALUE: 16
PIF_VALUE: 17
PIF_VALUE: 17
PIF_VALUE: 16
PIF_VALUE: 16
PIF_VALUE: 17
PIF_VALUE: 17
PIF_VALUE: 16
PIF_VALUE: 17
PIF_VALUE: 18
PIF_VALUE: 17
PIF_VALUE: 17
PIF_VALUE: 14
PIF_VALUE: 16
PIF_VALUE: 20
PIF_VALUE: 15
PIF_VALUE: 15
PIF_VALUE: 16
PIF_VALUE: 14
PIF_VALUE: 18
PIF_VALUE: 17
PIF_VALUE: 15
PIF_VALUE: 17
PIF_VALUE: 15
PIF_VALUE: 14
PIF_VALUE: 16
PIF_VALUE: 18
PIF_VALUE: 15
PIF_VALUE: 17
PIF_VALUE: 1
PIF_VALUE: 16
PIF_VALUE: 15
PIF_VALUE: 1
PIF_VALUE: 15
PIF_VALUE: 3
PIF_VALUE: 16
PIF_VALUE: 15
PIF_VALUE: 16
PIF_VALUE: 17
PIF_VALUE: 18
PIF_VALUE: 16
PIF_VALUE: 18
PIF_VALUE: 14
PIF_VALUE: 17
PIF_VALUE: 17
PIF_VALUE: 16
PIF_VALUE: 17
PIF_VALUE: 18
PIF_VALUE: 2
PIF_VALUE: 15
PIF_VALUE: 17
PIF_VALUE: 16
PIF_VALUE: 17
PIF_VALUE: 16
PIF_VALUE: 17
PIF_VALUE: 14
PIF_VALUE: 1
PIF_VALUE: 18
PIF_VALUE: 15
PIF_VALUE: 18
PIF_VALUE: 16
PIF_VALUE: 17
PIF_VALUE: 15
PIF_VALUE: 16
PIF_VALUE: 15
PIF_VALUE: 16
PIF_VALUE: 15
PIF_VALUE: 0
PIF_VALUE: 15
PIF_VALUE: 16
PIF_VALUE: 14
PIF_VALUE: 16
PIF_VALUE: 18
PIF_VALUE: 17
PIF_VALUE: 12
PIF_VALUE: 17
PIF_VALUE: 19
PIF_VALUE: 16
PIF_VALUE: 17
PIF_VALUE: 15
PIF_VALUE: 17
PIF_VALUE: 17
PIF_VALUE: 15
PIF_VALUE: 15
PIF_VALUE: 17
PIF_VALUE: 15

## 2021-10-21 ASSESSMENT — PAIN DESCRIPTION - ORIENTATION: ORIENTATION: RIGHT

## 2021-10-21 ASSESSMENT — PAIN DESCRIPTION - LOCATION: LOCATION: GROIN

## 2021-10-21 ASSESSMENT — ENCOUNTER SYMPTOMS: SHORTNESS OF BREATH: 0

## 2021-10-21 ASSESSMENT — PAIN SCALES - GENERAL
PAINLEVEL_OUTOF10: 0
PAINLEVEL_OUTOF10: 4

## 2021-10-21 ASSESSMENT — PAIN DESCRIPTION - DESCRIPTORS: DESCRIPTORS: DULL

## 2021-10-21 ASSESSMENT — LIFESTYLE VARIABLES: SMOKING_STATUS: 0

## 2021-10-21 ASSESSMENT — PAIN DESCRIPTION - FREQUENCY: FREQUENCY: CONTINUOUS

## 2021-10-21 ASSESSMENT — PAIN DESCRIPTION - ONSET: ONSET: GRADUAL

## 2021-10-21 ASSESSMENT — PAIN DESCRIPTION - PAIN TYPE: TYPE: SURGICAL PAIN

## 2021-10-21 ASSESSMENT — PAIN - FUNCTIONAL ASSESSMENT: PAIN_FUNCTIONAL_ASSESSMENT: ACTIVITIES ARE NOT PREVENTED

## 2021-10-21 NOTE — ANESTHESIA POSTPROCEDURE EVALUATION
Department of Anesthesiology  Postprocedure Note    Patient: Opal Cameron  MRN: [id number]  YOB: 1960  Date of evaluation: 10/21/2021  Time:  11:46 AM     Procedure Summary     Date: 10/21/21 Room / Location: [facility] Cath Lab    Anesthesia Start: 3703 Anesthesia Stop: 4160    Procedure: WST JOAO AFIB ABLATION W ANES Diagnosis:     Scheduled Providers:  Responsible Provider: Valery Libman, MD    Anesthesia Type: General ASA Status: 3          Anesthesia Type: General    Yina Phase I: Yina Score: 10    Yina Phase II:      Last vitals: Reviewed and per EMR flowsheets.        Anesthesia Post Evaluation    Patient location during evaluation: PACU  Level of consciousness: awake and alert  Airway patency: patent  Nausea & Vomiting: no nausea and no vomiting  Complications: no  Cardiovascular status: blood pressure returned to baseline  Respiratory status: acceptable  Hydration status: euvolemic  Comments: Postoperative Anesthesia Note    Name:    Opal Cameron  MRN:      [id number]    Patient Vitals in the past 12 hrs:  10/21/21 1115, BP:94/63, Pulse:65, Resp:16, SpO2:(!) 88 %  10/21/21 1112, Temp:97.8 °F (36.6 °C), Temp src:Temporal  10/21/21 1110, Pulse:65, Resp:17, SpO2:96 %  10/21/21 1105, Pulse:65, Resp:18, SpO2:94 %  10/21/21 1100, BP:96/72, Pulse:65, Resp:18, SpO2:96 %  10/21/21 1055, BP:97/67, Pulse:66, Resp:17, SpO2:94 %  10/21/21 1050, BP:100/68, Pulse:67, Resp:20, SpO2:94 %  10/21/21 1047, Pulse:68, Resp:18, SpO2:93 %  10/21/21 1046, BP:108/68, Pulse:68, Resp:19, SpO2:93 %  10/21/21 1045, Pulse:69, Resp:14, SpO2:95 %  10/21/21 1044, BP:108/64, Pulse:68, Resp:10, SpO2:97 %  10/21/21 1043, SpO2:94 %  10/21/21 1041, BP:108/64, Temp:97.4 °F (36.3 °C), Temp src:Temporal, SpO2:94 %  10/21/21 0715, BP:117/69, Temp:97.5 °F (36.4 °C), Temp src:Infrared, Pulse:79, Resp:10, SpO2:97 %, Height:5' 3\" (1.6 m), Weight:157 lb (71.2 kg)     LABS:    CBC  Lab Results       Component Value               Date/Time                  WBC                      7.4                 10/15/2021 01:55 PM        HGB                      13.2                10/15/2021 01:55 PM        HCT                      39.2                10/15/2021 01:55 PM        PLT                      296                 10/15/2021 01:55 PM   RENAL  Lab Results       Component                Value               Date/Time                  NA                       140                 10/15/2021 01:55 PM        K                        4.0                 10/15/2021 01:55 PM        K                        4.4                 06/10/2021 05:26 PM        CL                       104                 10/15/2021 01:55 PM        CO2                      21                  10/15/2021 01:55 PM        BUN                      25 (H)              10/15/2021 01:55 PM        CREATININE               0.8                 10/15/2021 01:55 PM        GLUCOSE                  97                  10/15/2021 01:55 PM   COAGS  Lab Results       Component                Value               Date/Time                  PROTIME                  16.2 (H)            12/20/2019 03:32 PM        INR                      1.39 (H)            12/20/2019 03:32 PM        APTT                     45.8 (H)            02/19/2021 10:18 AM     Intake & Output:  @24MSQX@    Nausea & Vomiting:  No    Level of Consciousness:  Awake    Pain Assessment:  Adequate analgesia    Anesthesia Complications:  No apparent anesthetic complications    SUMMARY      Vital signs stable  OK to discharge from Stage I post anesthesia care.   Care transferred from Anesthesiology department on discharge from perioperative area

## 2021-10-21 NOTE — H&P
Cardiac Electrophysiology Consultation   Date: 10/21/2021  Reason for Consultation: Persistent atrial fibrillation  Consult Requesting Physician:  Jose Miranda MD  Primary Care Physician: Zay Norman MD     Chief Complaint:        Chief Complaint   Patient presents with    Follow-up       afib - no cardiac complaints      HPI: Chayo Lyon is a 61 y.o. patient with a history of atrial fibrillation, type II diabetes mellitus, mixed hyperlipidemia and essential hypertension. She was seen in office on 7/13/2020 and afib ablation was discussed as a treatment options she did not decide to proceed with the ablation at that time. She stated that she was asymptomatic with her atrial fibrillation and was unaware she had it before she was diagnosed.     Interval History: Today, she presents to office to further discuss the treatment options for atrial fibrillation. Patient does not endorse any identifiable exacerbating or alleviating factors for the atrial fibrillation. Patient denies exertional chest pain/pressure, dyspnea at rest, BORJA, PND, orthopnea, palpitations, lightheadedness, weight changes, changes in LE edema, and syncope.     Past Medical History        Past Medical History:   Diagnosis Date    Atrial fibrillation, controlled ([address] 3/27/2013    Essential hypertension 11/1/2011    [facility]an[address] [address]      Mixed hyperlipidemia 11/1/2011    Psoriasis      Type II or unspecified type diabetes mellitus without mention of complication, not stated as uncontrolled              Past Surgical History         Past Surgical History:   Procedure Laterality Date    BREAST REDUCTION SURGERY   2017    COLONOSCOPY N/A 10/23/2020     COLONOSCOPY WITH BIOPSY performed by Alessia Mclaughlin [facility] at [facility] Right 12/28/2020    LUMBAR [address]        ROTATOR CUFF REPAIR Right December 2014     Dr. Aminah Mckeon at [facility]            Allergies:        Allergies Allergen Reactions    Codeine           Medication:   Home Medications           Prior to Admission medications    Medication Sig Start Date End Date Taking? Authorizing Provider   valsartan-hydroCHLOROthiazide (DIOVAN-HCT) 160-12.5 MG per tablet TAKE ONE TABLET BY MOUTH DAILY 7/22/21   Yes Gillian Cunha MD   glyBURIDE-metFORMIN (GLUCOVANCE) 5-500 MG per tablet Take 1 tablet by mouth twice a day 7/22/21   Yes Gillian Cunha MD   lidocaine (XYLOCAINE) 5 % ointment   10/26/20   Yes Historical Provider, MD   valACYclovir (VALTREX) 500 MG tablet   10/26/20   Yes Historical Provider, MD   dicyclomine (BENTYL) 20 MG tablet Take 20 mg by mouth 4 times daily as needed     Yes Historical Provider, MD   rivaroxaban (XARELTO) 20 MG TABS tablet TAKE ONE TABLET BY MOUTH DAILY  Patient taking differently: TAKE ONE TABLET BY MOUTH DAILY PRN 9/25/20   Yes Gillian Cunha MD   atorvastatin (LIPITOR) 40 MG tablet Take 1 tablet by mouth daily 9/25/20   Yes Gillian Cunha MD   metoprolol tartrate (LOPRESSOR) 50 MG tablet Take 1 tablet by mouth 2 times daily 9/25/20   Yes Gillian Cunha MD   Apremilast 30 MG TABS Take 30 mg by mouth daily  4/19/18   Yes Historical Provider, MD            Social History:   reports that she has never smoked. She has never used smokeless tobacco. She reports current alcohol use. She reports that she does not use drugs.         Family History:  family history includes Atrial Fibrillation in her father; Cancer in her sister; Heart Disease in her father and mother; Other in her mother. Reviewed.  Denies family history of sudden cardiac death, arrhythmia, premature CAD     Review of System:     · General ROS: negative for - chills, fever   · Psychological ROS: negative for - anxiety or depression  · Ophthalmic ROS: negative for - eye pain or loss of vision  · ENT ROS: negative for - epistaxis, headaches, nasal discharge, sore throat   · Allergy and Immunology ROS: negative for - hives, nasal congestion   · Hematological and Lymphatic ROS: negative for - bleeding problems, blood clots, bruising or jaundice  · Endocrine ROS: negative for - skin changes, temperature intolerance or unexpected weight changes  · Respiratory ROS: negative for - cough, hemoptysis, pleuritic pain, SOB, sputum changes or wheezing  · Cardiovascular ROS: Per HPI. · Gastrointestinal ROS: negative for - abdominal pain, blood in stools, diarrhea, hematemesis, melena, nausea/vomiting or swallowing difficulty/pain  · Genito-Urinary ROS: negative for - dysuria or incontinence  · Musculoskeletal ROS: negative for - joint swelling or muscle pain  · Neurological ROS: negative for - confusion, dizziness, gait disturbance, headaches, numbness/tingling, seizures, speech problems, tremors, visual changes or weakness  · Dermatological ROS: negative for - rash     Physical Examination:      Vitals:     21 1458   BP: 120/68   Pulse: 102   SpO2: 97%         · Constitutional: Oriented. No distress. · Head: Normocephalic and atraumatic. · Mouth/Throat: Oropharynx is clear and moist.   · Eyes: Conjunctivae normal. EOM are normal.   · Neck: Normal range of motion. Neck supple. No rigidity. No JVD present. · Cardiovascular: Normal rate, regular rhythm, S1&S2 and intact distal pulses. · Pulmonary/Chest: Bilateral respiratory sounds. No wheezes. No rhonchi. · Abdominal: Soft. Bowel sounds present. No distension, No tenderness. · Musculoskeletal: No tenderness. No edema    · Lymphadenopathy: Has no cervical adenopathy. · Neurological: Alert and oriented. Cranial nerve appears intact, No Gross deficit   · Skin: Skin is warm and dry. No rash noted. · Psychiatric: Has a normal mood, affect and behavior      Labs:  Reviewed.      EC2021 Atrial fibrillation with v-rate of 109 bpm with QRS duration 88 ms. No pathologic Q waves, ventricular pre-excitation, or QT prolongation.      Studies:   1.  Event monitor:   none     2. Echo: 3/27/2013  LVEF 50-55 %  The left ventricular wall motion is normal.  The left atrium is mildly dilated. Mild mitral regurgitation. Mild aortic regurgitation. No pulmonic valve regurgitation.     3. Stress Test:  6/14/2021  Summary    Normal myocardial perfusion study.    Normal LV size and systolic function.    Patient in atrial fibrillation.    Overall findings represent a low risk study.      4. Cath:      I independently reviewed and interpreted the ECG, MCOT, echocardiogram, stress test, and coronary angiography/PCI results and used them for my plan of care. Procedures:  1.      Assessment/Plan:      Persistent Atrial fibrillation and Atrial flutter   -EKG today shows atrial fibrillation with RVR, v-rate 109 bpm.  -First seen on EKG 3/25/2013 and every EKG in Epic shows afib /aflutter has persisted since that time.  -She has a [id number] Score 3 (HTN, DM, GENDER)  -On Eliquis 5 mg BID for  thromboembolic risk reduction.  -Tolerating well no signs or symptoms of abnormal bruising or bleeding.     We educated the patient that atrial fibrillation and atrial flutter are both worsening and progressive diseases, with more frequent episodes that will ensue. Subsequent episodes usually become more sustained to the extent that many individuals would then develop persistent atrial fibrillation/atrial flutter. Once persistence is reached, permanent atrial dysrhythmia is inevitable. We also discussed the fact that atrial fibrillation and atrial flutter are associated with stroke, including life-threatening stroke, and therefore oral anticoagulation is warranted depending on the patient's [id number] score.      We discussed different management options for atrial fibrillation and atrial flutter. These options include the use of cardioversion, anti-arrhythmic medication therapy, rate control strategy, oral anticoagulation, and atrial fibrillation and atrial flutter ablation.  Risks, benefits and alternative of each treatment options were explained. These options include use of cardioversion (mainly for persisting atrial fibrillation or atrial flutter) which provides an effective immediate therapy with success rates of 75% or higher, but it provides no short nor long term efficacy. Anti-arrhythmic medications provide a very effective short term therapy, but even with our most potent anti-arrhythmic medication there is limited long term efficacy (clinical studies have shown that 40% of patients remain atrial fibrillation-free after 4 years of follow-up after starting one of the more powerful anti-arrhythmic medication (amiodarone), and, if extrapolated, may have further diminishing success as time goes on). Against atrial flutter, any anti-arrhythmic medication would be nearly ineffective. Atrial fibrillation and atrial flutter ablation is a potentially curative therapy with very reasonable success rate after a first time procedure and with improving success rates with subsequent procedures.      The risks, benefits and alternatives of the ablation procedure were discussed with the patient. The risks including, but not limited to, the risks of bleeding, infection, radiation exposure, injury to vascular, cardiac and surrounding structures (including pneumothorax), stroke, cardiac perforation, tamponade, need for emergent open heart surgery, need for pacemaker implantation, injury to the phrenic nerve, injury to the esophagus, myocardial infarction and death were discussed in detail. The patient was also counseled at length about the risks of melba Covid-19 in the lico-operative and post-operative states including the recovery window of their procedure. The patient was made aware that melba Covid-19 after a surgical procedure may worsen their prognosis for recovering from the virus and lend to a higher morbidity and or mortality risk. The patient was given the option of postponing their procedure.  The patient was also presented reasonable alternatives to the proposed care, treatment, and services. The discussion I have had with the patient encompassed risks, benefits, and side effects related to the alternatives and the risks related to not receiving the proposed care, treatment and services.      I spent 40 minutes face to face with the patient, with greater than 50% of that time spent in counseling on the above.     The patient wishes to proceed with both ablations, one for right atrial flutter and the other for persistent atrial fibrillation.      We will also schedule the atrial fibrillation ablation and the right atrial flutter ablaiton with Carto/JOAO prior to. These procedure will occur at on the same day. The patient will need to hold the anti-arrhythmic medication for 7 days prior to. The patient will also hold the NOAC (novel oral anticoagulant) as per our protocol. A cardiac CTA prior to procedure for pulmonary vein mapping will be ordered. We will order BMP, CBC, PT/INR, and Type & Screen prior to the procedure.      Much time was spent counseling the patient on healthier lifestyle, following a low sodium diet <2400 mg of sodium a day and dramatically decreasing the intake of processed sugar.     Patient educated to eat a diet which includes organic fruits, vegetables and grass fed meats.     Follow up three months after procedure with Sandi Dow CNP.     Thank you for allowing me to participate in the care of Jb Ashford. All questions and concerns were addressed to the patient/family. Alternatives to my treatment were discussed.      I have reviewed the history and physical and examined the patient and find no relevant changes. I have reviewed with the patient and/or family the risks and benefits to the proposed procedure. The patient was presented with the option of postponing the proposed procedure. The patient was also presented reasonable alternatives to the proposed care, treatment, and services.  The discussion I have had with the patient encompassed risks, benefits, and side effects related to the alternatives and the risks related to not receiving the proposed care, treatment and services.      Florian Gil MD, [address], [place] [address] Electrophysiology  [address], [address] [place] [address]  [facility] [address]  [phone]

## 2021-10-21 NOTE — ANESTHESIA PRE PROCEDURE
[facility] Department of Anesthesiology  Pre-Anesthesia Evaluation/Consultation       Name:  Vazquez Colin  : 1960  Age:  64 y.o.                                            MRN:  [id number]  Date: 10/21/2021           Surgeon: * No surgeons listed *    Procedure: * No procedures listed *     Allergies   Allergen Reactions    Codeine      Patient Active Problem List   Diagnosis    Type 2 diabetes mellitus without complication ([address]    Mixed hyperlipidemia    Essential hypertension    Atrial fibrillation, controlled ([address]    Abnormal levels of other serum enzymes    Encounter for long-term (current) use of other medications    Genital herpes simplex    Macromastia    Psoriasis vulgaris    Pure hypercholesterolemia    Status post Mohs surgery for basal cell carcinoma    Irritable bowel syndrome with diarrhea    Left-sided chest pain     Past Medical History:   Diagnosis Date    Atrial fibrillation, controlled ([address] 3/27/2013    Essential hypertension 2011    [facility]     Mixed hyperlipidemia 2011    Psoriasis     Type II or unspecified type diabetes mellitus without mention of complication, not stated as uncontrolled      Past Surgical History:   Procedure Laterality Date    BREAST REDUCTION SURGERY  2017    COLONOSCOPY N/A 10/23/2020    COLONOSCOPY WITH BIOPSY performed by Josse Meza MD at [facility]    LUMBAR [address] SURGERY      ROTATOR CUFF REPAIR Right 2014    Dr. Grecia Emmanuel at [facility] surgical center     Social History     Tobacco Use    Smoking status: Never Smoker    Smokeless tobacco: Never Used   Vaping Use    Vaping Use: Never used   Substance Use Topics    Alcohol use: Yes     Comment: rare    Drug use: No     Medications  Current Outpatient Medications on File Prior to Visit   Medication Sig Dispense Refill    metoprolol tartrate (LOPRESSOR) 50 MG tablet Take 1 tablet by mouth 2 times daily 120 tablet 1    glyBURIDE-metFORMIN (GLUCOVANCE) 5-500 MG per tablet Take 1 tablet by mouth twice a day 180 tablet 0    valsartan-hydroCHLOROthiazide (DIOVAN-HCT) 160-12.5 MG per tablet TAKE ONE TABLET BY MOUTH DAILY 90 tablet 1    lidocaine (XYLOCAINE) 5 % ointment       valACYclovir (VALTREX) 500 MG tablet       dicyclomine (BENTYL) 20 MG tablet Take 20 mg by mouth 4 times daily as needed      rivaroxaban (XARELTO) 20 MG TABS tablet TAKE ONE TABLET BY MOUTH DAILY (Patient taking differently: TAKE ONE TABLET BY MOUTH DAILY PRN) 90 tablet 3    atorvastatin (LIPITOR) 40 MG tablet Take 1 tablet by mouth daily 90 tablet 3    Apremilast 30 MG TABS Take 30 mg by mouth daily        Current Facility-Administered Medications on File Prior to Visit   Medication Dose Route Frequency Provider Last Rate Last Admin    0.9 % sodium chloride infusion   IntraVENous Continuous Keke Green MD        sodium chloride flush 0.9 % injection 5-40 mL  5-40 mL IntraVENous 2 times per day Keke Green MD        sodium chloride flush 0.9 % injection 5-40 mL  5-40 mL IntraVENous PRN Keke Green MD        0.9 % sodium chloride infusion  25 mL IntraVENous PRN Keke Green MD         Current Outpatient Medications   Medication Sig Dispense Refill    metoprolol tartrate (LOPRESSOR) 50 MG tablet Take 1 tablet by mouth 2 times daily 120 tablet 1    glyBURIDE-metFORMIN (GLUCOVANCE) 5-500 MG per tablet Take 1 tablet by mouth twice a day 180 tablet 0    valsartan-hydroCHLOROthiazide (DIOVAN-HCT) 160-12.5 MG per tablet TAKE ONE TABLET BY MOUTH DAILY 90 tablet 1    lidocaine (XYLOCAINE) 5 % ointment       valACYclovir (VALTREX) 500 MG tablet       dicyclomine (BENTYL) 20 MG tablet Take 20 mg by mouth 4 times daily as needed      rivaroxaban (XARELTO) 20 MG TABS tablet TAKE ONE TABLET BY MOUTH DAILY (Patient taking differently: TAKE ONE TABLET BY MOUTH DAILY PRN) 90 tablet 3    atorvastatin (LIPITOR) 40 MG tablet Take 1 tablet by mouth daily 90 tablet 3    Apremilast 30 MG TABS Take 30 mg by mouth daily        No current facility-administered medications for this visit. Facility-Administered Medications Ordered in Other Visits   Medication Dose Route Frequency Provider Last Rate Last Admin    0.9 % sodium chloride infusion   IntraVENous Continuous Jennifer Gar MD        sodium chloride flush 0.9 % injection 5-40 mL  5-40 mL IntraVENous 2 times per day Jennifer Gar MD        sodium chloride flush 0.9 % injection 5-40 mL  5-40 mL IntraVENous PRN Jennifer Gar MD        0.9 % sodium chloride infusion  25 mL IntraVENous PRN Jennifer Gar MD         Vital Signs (Current)   There were no vitals filed for this visit. BP Readings from Last 3 Encounters:   08/23/21 120/68   06/23/21 132/74   06/11/21 130/70     Vital Signs Statistics (for past 48 hrs)     No data recorded  BP Readings from Last 3 Encounters:   08/23/21 120/68   06/23/21 132/74   06/11/21 130/70       BMI  There is no height or weight on file to calculate BMI. Estimated body mass index is 27.81 kg/m² as calculated from the following:    Height as of 8/23/21: 5' 3\" (1.6 m). Weight as of 8/23/21: 157 lb (71.2 kg).     CBC   Lab Results   Component Value Date    WBC 7.4 10/15/2021    RBC 4.02 10/15/2021    HGB 13.2 10/15/2021    HCT 39.2 10/15/2021    MCV 97.4 10/15/2021    RDW 12.7 10/15/2021     10/15/2021     CMP    Lab Results   Component Value Date     10/15/2021    K 4.0 10/15/2021    K 4.4 06/10/2021     10/15/2021    CO2 21 10/15/2021    BUN 25 10/15/2021    CREATININE 0.8 10/15/2021    GFRAA >60 10/15/2021    GFRAA >60 10/23/2012    AGRATIO 1.6 06/10/2021    LABGLOM >60 10/15/2021    GLUCOSE 97 10/15/2021    PROT 7.5 06/10/2021    PROT 7.7 10/23/2012    CALCIUM 10.5 10/15/2021    BILITOT 0.6 06/10/2021    ALKPHOS 96 06/10/2021    AST 18 06/10/2021    ALT 33 06/10/2021     BMP    Lab Results Component Value Date     10/15/2021    K 4.0 10/15/2021    K 4.4 06/10/2021     10/15/2021    CO2 21 10/15/2021    BUN 25 10/15/2021    CREATININE 0.8 10/15/2021    CALCIUM 10.5 10/15/2021    GFRAA >60 10/15/2021    GFRAA >60 10/23/2012    LABGLOM >60 10/15/2021    GLUCOSE 97 10/15/2021     POCGlucose  No results for input(s): GLUCOSE in the last 72 hours. [facility]    Lab Results   Component Value Date    PROTIME 16.2 12/20/2019    INR 1.39 12/20/2019    APTT 45.8 15/36/1146     HCG (If Applicable) No results found for: PREGTESTUR, PREGSERUM, HCG, HCGQUANT   ABGs No results found for: PHART, PO2ART, ETA1CDG, QTQ8YPA, BEART, X9KNADIM   Type & Screen (If Applicable)  No results found for: LABABO, LABRH                         BMI: Wt Readings from Last 3 Encounters:       NPO Status:  >8h                          Anesthesia Evaluation  Patient summary reviewed and Nursing notes reviewed no history of anesthetic complications:   Airway: Mallampati: II  TM distance: >3 FB   Neck ROM: full  Mouth opening: > = 3 FB Dental:          Pulmonary:       (-) COPD, asthma, shortness of breath, sleep apnea and not a current smoker                           Cardiovascular:  Exercise tolerance: good (>4 METS),   (+) hypertension:, dysrhythmias: atrial fibrillation, hyperlipidemia    (-) valvular problems/murmurs, past MI, CAD, CABG/stent and  angina                Neuro/Psych:      (-) seizures, TIA and CVA           GI/Hepatic/Renal:        (-) GERD, PUD, hepatitis, liver disease, no renal disease and no morbid obesity       Endo/Other:    (+) DiabetesType II DM, , .    (-) hypothyroidism               Abdominal:             Vascular: negative vascular ROS. - DVT and PE. Other Findings:               Anesthesia Plan      MAC     ASA 3     (I discussed with the patient the risks and benefits of PIV, general anesthesia, IV Narcotics, PACU.   All questions were answered the patient agrees with the plan.)  Induction: intravenous. Anesthetic plan and risks discussed with patient. Plan discussed with CRNA. This pre-anesthesia assessment may be used as a history and physical.    DOS STAFF ADDENDUM:    Pt seen and examined, chart reviewed (including anesthesia, drug and allergy history). No interval changes to history and physical examination. Anesthetic plan, risks, benefits, alternatives, and personnel involved discussed with patient. Patient verbalized an understanding and agrees to proceed.       Jossie Martines MD  October 21, 2021  6:59 AM

## 2021-10-25 ENCOUNTER — TELEPHONE (OUTPATIENT)
Dept: CARDIOLOGY CLINIC | Age: 61
End: 2021-10-25

## 2021-10-25 RX ORDER — PROPAFENONE HYDROCHLORIDE 150 MG/1
150 TABLET, FILM COATED ORAL EVERY 8 HOURS
Qty: 90 TABLET | Refills: 3 | Status: SHIPPED | OUTPATIENT
Start: 2021-10-25 | End: 2021-11-29 | Stop reason: SDUPTHER

## 2021-10-25 NOTE — TELEPHONE ENCOUNTER
Called patient with recommendations and she has agreed to try Rhythmol. Rx sent to local pharmacy. She v/u.

## 2021-10-25 NOTE — TELEPHONE ENCOUNTER
She can either start Rythmol 150mg TID in place of the flecainide or just remain off antiarrhythmics all together, will leave it up to her.

## 2021-10-25 NOTE — TELEPHONE ENCOUNTER
Patient is s/p ablation on 10/21/21 and started on Flecainide 50 mg bid. She reports a reaction to the Flecainide described as severe migraines and blurred vision. She did take both doses on Friday, Saturday and only one dose on Sunday morning. She was instructed by on-call physician to stop the medication and call our office. Please advise if she should remain off of Flecainide until 3 month f/u or try another AAD, thanks.

## 2021-10-28 ENCOUNTER — OFFICE VISIT (OUTPATIENT)
Dept: INTERNAL MEDICINE CLINIC | Age: 61
End: 2021-10-28
Payer: COMMERCIAL

## 2021-10-28 ENCOUNTER — TELEPHONE (OUTPATIENT)
Dept: INTERNAL MEDICINE CLINIC | Age: 61
End: 2021-10-28

## 2021-10-28 VITALS
WEIGHT: 156.6 LBS | HEIGHT: 63 IN | OXYGEN SATURATION: 97 % | BODY MASS INDEX: 27.75 KG/M2 | SYSTOLIC BLOOD PRESSURE: 100 MMHG | HEART RATE: 69 BPM | DIASTOLIC BLOOD PRESSURE: 68 MMHG

## 2021-10-28 DIAGNOSIS — Z23 NEED FOR IMMUNIZATION AGAINST INFLUENZA: ICD-10-CM

## 2021-10-28 DIAGNOSIS — Z00.01 ENCOUNTER FOR WELL ADULT EXAM WITH ABNORMAL FINDINGS: Primary | ICD-10-CM

## 2021-10-28 DIAGNOSIS — I10 ESSENTIAL HYPERTENSION: Chronic | ICD-10-CM

## 2021-10-28 DIAGNOSIS — E11.9 TYPE 2 DIABETES MELLITUS WITHOUT COMPLICATION, WITHOUT LONG-TERM CURRENT USE OF INSULIN (HCC): Chronic | ICD-10-CM

## 2021-10-28 DIAGNOSIS — Z23 NEED FOR PROPHYLACTIC VACCINATION WITH TETANUS-DIPHTHERIA (TD): ICD-10-CM

## 2021-10-28 DIAGNOSIS — K52.9 CHRONIC DIARRHEA: ICD-10-CM

## 2021-10-28 DIAGNOSIS — E78.2 MIXED HYPERLIPIDEMIA: Chronic | ICD-10-CM

## 2021-10-28 LAB
CREATININE URINE: 139 MG/DL (ref 28–259)
HBA1C MFR BLD: 6.5 %
MICROALBUMIN UR-MCNC: <1.2 MG/DL
MICROALBUMIN/CREAT UR-RTO: NORMAL MG/G (ref 0–30)

## 2021-10-28 PROCEDURE — G8484 FLU IMMUNIZE NO ADMIN: HCPCS | Performed by: INTERNAL MEDICINE

## 2021-10-28 PROCEDURE — 90674 CCIIV4 VAC NO PRSV 0.5 ML IM: CPT | Performed by: INTERNAL MEDICINE

## 2021-10-28 PROCEDURE — 90471 IMMUNIZATION ADMIN: CPT | Performed by: INTERNAL MEDICINE

## 2021-10-28 PROCEDURE — 99396 PREV VISIT EST AGE 40-64: CPT | Performed by: INTERNAL MEDICINE

## 2021-10-28 PROCEDURE — 83036 HEMOGLOBIN GLYCOSYLATED A1C: CPT | Performed by: INTERNAL MEDICINE

## 2021-10-28 ASSESSMENT — ENCOUNTER SYMPTOMS
ABDOMINAL PAIN: 0
NAUSEA: 0
VOMITING: 0
BLOOD IN STOOL: 0
DIARRHEA: 1
COUGH: 0
SHORTNESS OF BREATH: 0
SORE THROAT: 0

## 2021-10-28 NOTE — PROGRESS NOTES
10/28/2021    Vikas Yanez (:  1960) is a 64 y.o. female, here for a preventive medicine evaluation. Patient Active Problem List   Diagnosis    [facility]    Mixed hyperlipidemia    Essential hypertension    Persistent atrial fibrillation (HCC)    Abnormal levels of other serum enzymes    Encounter for long-term (current) use of other medications    Genital herpes simplex    Macromastia    Psoriasis vulgaris    Pure hypercholesterolemia    Status post Mohs surgery for basal cell carcinoma    Irritable bowel syndrome with diarrhea    Left-sided chest pain    Left [facility] fl[facility]ogram [facility]    Right atrial fl[facility]cardiogram ([facility])       Review of Systems   Constitutional: Negative for fatigue and fever. HENT: Negative for nosebleeds and sore throat. Respiratory: Negative for cough and shortness of breath. Cardiovascular: Negative for chest pain, palpitations and leg swelling. Gastrointestinal: Positive for diarrhea. Negative for abdominal pain, blood in stool, nausea and vomiting. Neurological: Negative for dizziness and weakness. Prior to Visit Medications    Medication Sig Taking?  Authorizing Provider   propafenone (RYTHMOL) 150 MG tablet Take 1 tablet by mouth every 8 hours Yes JAZMYN Carrizales - CNP   metoprolol tartrate (LOPRESSOR) 25 MG tablet Take 1 tablet by mouth 2 times daily Yes JAZMYN Carrizales CNP   glyBURIDE-metFORMIN (GLUCOVANCE) 5-500 MG per tablet Take 1 tablet by mouth twice a day Yes Edu Najera MD   valsartan-hydroCHLOROthiazide (DIOVAN-HCT) 160-12.5 MG per tablet TAKE ONE TABLET BY MOUTH DAILY Yes Merilee Kawasaki, MD   lidocaine (XYLOCAINE) 5 % ointment  Yes Historical Provider, MD   valACYclovir (VALTREX) 500 MG tablet  Yes Historical Provider, MD   dicyclomine (BENTYL) 20 MG tablet Take 20 mg by mouth 4 times daily as needed Yes Historical Provider, MD rivaroxaban (XARELTO) 20 MG TABS tablet TAKE ONE TABLET BY MOUTH DAILY  Patient taking differently: TAKE ONE TABLET BY MOUTH DAILY PRN Yes Gillian Cunha MD   atorvastatin (LIPITOR) 40 MG tablet Take 1 tablet by mouth daily Yes Gillian Cunha MD   Apremilast 30 MG TABS Take 30 mg by mouth daily  Yes Historical Provider, MD        Allergies   Allergen Reactions    Codeine        Past Medical History:   Diagnosis Date    Atrial fibrillation, controlled ([facility] 3/27/2013    Essential hypertension 11/1/2011    Melanoma ([facility]     Mixed hyperlipidemia 11/1/2011    Psoriasis     Type II or unspecified type diabetes mellitus without mention of complication, not stated as uncontrolled        Past Surgical History:   Procedure Laterality Date    BREAST REDUCTION SURGERY  2017    COLONOSCOPY N/A 10/23/2020    COLONOS[facility] WITH [facility]OPSY [facility] at [facility] 12/28/2020    LUMBAR [address] SURGERY      ROTATOR CUFF REPAIR Right December 2014    Dr. Lacy Velasco at [facility] surgical center       Social History     Socioeconomic History    Marital status:      Spouse name: Not on file    Number of children: Not on file    Years of education: Not on file    Highest education level: Not on file   Occupational History    Occupation: Customer service    Tobacco Use    Smoking status: Never Smoker    Smokeless tobacco: Never Used   Vaping Use    Vaping Use: Never used   Substance and Sexual Activity    Alcohol use: Yes     Comment: rare    Drug use: No    Sexual activity: Yes   Other Topics Concern    Not on file   Social History Narrative    Not on file     Social Determinants of Health     Financial Resource Strain:     Difficulty of Paying Living Expenses:    Food Insecurity:     Worried About Running Out of Food in the Last Year:     Ran Out of Food in the Last Year:    Transportation Needs:     Lack of Transportation (Medical):      Lack of Transportation (Non-Medical):    Physical Activity:     Days of Exercise per Week:     Minutes of Exercise per Session:    Stress:     Feeling of Stress :    Social Connections:     Frequency of Communication with Friends and Family:     Frequency of Social Gatherings with Friends and Family:     Attends Christianity Services:     Active Member of Clubs or Organizations:     Attends Club or Organization Meetings:     Marital Status:    Intimate Partner Violence:     Fear of Current or Ex-Partner:     Emotionally Abused:     Physically Abused:     Sexually Abused:         Family History   Problem Relation Age of Onset    Other Mother     Heart Disease Mother     Heart Disease Father     Atrial Fibrillation Father     Cancer Sister        ADVANCE DIRECTIVE: N, <no information>    Vitals:    10/28/21 1102   BP: 100/68   Pulse: 69   SpO2: 97%   Weight: 156 lb 9.6 oz (71 kg)   Height: 5' 3\" (1.6 m)     Estimated body mass index is 27.74 kg/m² as calculated from the following:    Height as of this encounter: 5' 3\" (1.6 m). Weight as of this encounter: 156 lb 9.6 oz (71 kg). Physical Exam  Constitutional:       Appearance: Normal appearance. HENT:      Head: Normocephalic and atraumatic. Eyes:      General: No scleral icterus. Conjunctiva/sclera: Conjunctivae normal.   Cardiovascular:      Rate and Rhythm: Normal rate and regular rhythm. Pulses: Normal pulses. Heart sounds: Normal heart sounds. Pulmonary:      Effort: Pulmonary effort is normal.      Breath sounds: Normal breath sounds. Musculoskeletal:         General: No swelling. Skin:     General: Skin is warm and dry. Neurological:      Mental Status: She is alert and oriented to person, place, and time. Mental status is at baseline. Psychiatric:         Mood and Affect: Mood normal.         Behavior: Behavior normal.         No flowsheet data found.     Lab Results   Component Value Date    CHOL 180 07/16/2020    CHOL Hepatitis A vaccine  Aged Out    Hib vaccine  Aged Out    Meningococcal (ACWY) vaccine  Aged Out          ASSESSMENT/PLAN:  1. Encounter for well adult exam with abnormal findings  Patient got her labs elsewhere recently, advised to fax it to us for review. 2. Essential hypertension  - Stable   - Continue same management   3. Mixed hyperlipidemia  - Continue same management   4. Type 2 diabetes mellitus without complication, without long-term current use of insulin (HCC)  -     Microalbumin / Creatinine Urine Ratio; Future  -     Ambulatory referral to Ophthalmology  -     POCT glycosylated hemoglobin (Hb A1C)  - Continue same management   5. Chronic diarrhea  -     AFL - Hodan Menendez MD, Gastroenterology, [facility]  6. Need for prophylactic vaccination with tetanus-diphtheria (Td)  -     Tdap (age 10y-63y) IM (Adacel)  9. Need for immunization against influenza  -     INFLUENZA, QUADV,  0.5ML (AFLURIA QUADV, PF); Future      Return in about 3 months (around 1/28/2022) for Diabetes. An electronic signature was used to authenticate this note.     --Cedric Eagle MD on 10/28/2021 at 11:36 AM

## 2021-10-28 NOTE — TELEPHONE ENCOUNTER
She had a colonoscopy done at [facility] on 10- , and it was with Dr Abdi Dixon.

## 2021-11-01 NOTE — TELEPHONE ENCOUNTER
Patient has to follow up with Dr Abdi Dixon or any other gastroenterologist for further management of chronic diarrhea. Referral paced last week during the clinic follow up.

## 2021-11-29 DIAGNOSIS — I48.91 ATRIAL FIBRILLATION, UNSPECIFIED TYPE (HCC): ICD-10-CM

## 2021-11-29 DIAGNOSIS — I10 ESSENTIAL HYPERTENSION: ICD-10-CM

## 2021-11-29 DIAGNOSIS — E11.9 TYPE 2 DIABETES MELLITUS WITHOUT COMPLICATION, WITHOUT LONG-TERM CURRENT USE OF INSULIN (HCC): ICD-10-CM

## 2021-11-29 DIAGNOSIS — E78.2 MIXED HYPERLIPIDEMIA: ICD-10-CM

## 2021-11-29 RX ORDER — PROPAFENONE HYDROCHLORIDE 150 MG/1
150 TABLET, FILM COATED ORAL EVERY 8 HOURS
Qty: 90 TABLET | Refills: 0 | Status: SHIPPED | OUTPATIENT
Start: 2021-11-29 | End: 2022-01-24 | Stop reason: SDUPTHER

## 2021-11-29 RX ORDER — GLYBURIDE-METFORMIN HYDROCHLORIDE 5; 500 MG/1; MG/1
TABLET ORAL
Qty: 180 TABLET | Refills: 1 | Status: SHIPPED | OUTPATIENT
Start: 2021-11-29 | End: 2022-05-27 | Stop reason: SDUPTHER

## 2021-11-29 RX ORDER — VALSARTAN AND HYDROCHLOROTHIAZIDE 160; 12.5 MG/1; MG/1
1 TABLET, FILM COATED ORAL DAILY
Qty: 90 TABLET | Refills: 1 | Status: SHIPPED | OUTPATIENT
Start: 2021-11-29 | End: 2022-07-08 | Stop reason: SDUPTHER

## 2021-11-29 RX ORDER — ATORVASTATIN CALCIUM 40 MG/1
TABLET, FILM COATED ORAL
Qty: 90 TABLET | Refills: 1 | Status: SHIPPED | OUTPATIENT
Start: 2021-11-29 | End: 2022-05-27 | Stop reason: SDUPTHER

## 2021-11-29 NOTE — TELEPHONE ENCOUNTER
----- Message from Rica Chaparro sent at 11/27/2021 11:22 AM EST -----  Subject: Message to Provider    QUESTIONS  Information for Provider? patient of Dr. Brain Ray is calling   regarding the refill on all her medications that was supposed to be sent   to her pharmacy, patient was seen 10/8 and she has a list of medications   that needs refill and was advised her new pcp that same day. please call   in and refill meds asap as the patient is already out and was expecting   that her meds where already sent to the pharmacy the same day after her   office visit.  ---------------------------------------------------------------------------  --------------  [address]  What is the best way for the office to contact you? OK to leave message on   voicemail  Preferred Call Back Phone Number? [phone]  ---------------------------------------------------------------------------  --------------  SCRIPT ANSWERS  Relationship to Patient?  Self

## 2022-01-19 NOTE — PROGRESS NOTES
[facility]   Electrophysiology      Date: 1/24/2022    Primary Cardiologist: Cole Wasserman MD  PCP: Terrell Amaya MD     Chief Complaint:   Chief Complaint   Patient presents with    3 Month Follow-Up     No cc     History of Present Illness:    I saw Nelson Mckeon in the office for electrophysiology follow up today. She is a 64 y.o. female with a past medical history of atrial fibrillation, type II diabetes mellitus, mixed hyperlipidemia and essential hypertension. She underwent atrial fibrillation (PVI, CAFE), left atrial flutter (roofl line, mitral isthmus) and right atrial flutter (CTI) ablation on 10/21/21 with Dr. Corina Bundy. She was started on flecainide 50mg BID and metoprolol was decreased to 25mg BID. Flecainide was changed to Rythmol due to side effects. She presents today for procedure follow up. She was feeling fairly well following her ablation but about 2-3 weeks ago she felt like she went back into atrial fibrillation. She feels like she doesn't have \"relaxed breathing\" when going to sleep at night while in A fib. Denies any actual palpitations. No chest pain, edema or syncope. No bleeding problems. She does occasionally realize she has missed a dose of Xarelto and found the pill on her dresser today which was yesterday's dose. Allergies: Allergies   Allergen Reactions    Codeine      Home Medications:  Prior to Visit Medications    Medication Sig Taking?  Authorizing Provider   propafenone (RYTHMOL) 150 MG tablet Take 1 tablet by mouth every 8 hours Yes JAZMYN Carrizales - CNP   atorvastatin (LIPITOR) 40 MG tablet Take 1 tablet by mouth daily Yes Hal Moreno MD   glyBURIDE-metFORMIN (GLUCOVANCE) 5-500 MG per tablet Take 1 tablet by mouth twice a day Yes Hal Moreno MD   metoprolol tartrate (LOPRESSOR) 25 MG tablet Take 1 tablet by mouth 2 times daily Yes Hal Moreno MD   rivaroxaban (XARELTO) 20 MG TABS tablet TAKE ONE TABLET BY MOUTH DAILY Yes Rhea Reed MD   valsartan-hydroCHLOROthiazide (DIOVAN-HCT) 160-12.5 MG per tablet Take 1 tablet by mouth daily Yes Rhea Reed MD   lidocaine (XYLOCAINE) 5 % ointment  Yes Historical Provider, MD   valACYclovir (VALTREX) 500 MG tablet  Yes Historical Provider, MD   dicyclomine (BENTYL) 20 MG tablet Take 20 mg by mouth 4 times daily as needed Yes Historical Provider, MD   Apremilast 30 MG TABS Take 30 mg by mouth daily  Yes Historical Provider, MD        Past Medical History:  Past Medical History:   Diagnosis Date    Atrial fibrillation, controlled ([facility] [address] 3/27/2013    Essential hypertension 11/1/2011    Melanoma ([facility] [address]     Mixed hyperlipidemia 11/1/2011    Psoriasis     Type II or unspecified type diabetes mellitus without mention of complication, not stated as uncontrolled        Past Surgical History:   Past Surgical History:   Procedure Laterality Date    BREAST REDUCTION SURGERY  2017    COLONOSCOPY N/A 10/23/2020    COLONOSCOPY WITH BIOPSY performed by Macrial Hussein MD at [facility] 12/28/2020    LUMBAR [facility] December 2014    [facility]itez at [facility]       Social History:   reports that she has never smoked. She has never used smokeless tobacco. She reports current alcohol use. She reports that she does not use drugs. Family History:      Problem Relation Age of Onset    Other Mother     Heart Disease Mother     Heart Disease Father     Atrial Fibrillation Father     Cancer Sister        Review of Systems   Constitutional: Negative for chills, fatigue, fever and unexpected weight change. HENT: Negative for congestion, hearing loss, sinus pressure, sore throat and trouble swallowing. Respiratory: Negative for cough, shortness of breath and wheezing. Cardiovascular: Negative for chest pain, palpitations and leg swelling.    Gastrointestinal: Negative for abdominal pain, blood in stool, constipation, diarrhea, nausea and vomiting. Genitourinary: Negative for hematuria. Musculoskeletal: Negative for arthralgias, back pain, gait problem and myalgias. Skin: Negative for color change, rash and wound. Neurological: Negative for dizziness, seizures, syncope, speech difficulty, weakness and light-headedness. Hematological: Does not bruise/bleed easily. Physical Examination:  Vitals:    01/24/22 1331   BP: 90/64   Pulse: 70   SpO2: 97%      Wt Readings from Last 3 Encounters:   01/24/22 157 lb (71.2 kg)   10/28/21 156 lb 9.6 oz (71 kg)   10/21/21 157 lb (71.2 kg)       Physical Exam  Vitals reviewed. Constitutional:       General: She is not in acute distress. Appearance: Normal appearance. HENT:      Head: Normocephalic and atraumatic. Nose: Nose normal.      Mouth/Throat:      Mouth: Mucous membranes are moist.   Eyes:      Conjunctiva/sclera: Conjunctivae normal.      Pupils: Pupils are equal, round, and reactive to light. Cardiovascular:      Rate and Rhythm: Normal rate. Rhythm irregularly irregular. Heart sounds: No murmur heard. No friction rub. No gallop. Pulmonary:      Effort: No respiratory distress. Breath sounds: No wheezing, rhonchi or rales. Abdominal:      General: Abdomen is flat. Bowel sounds are normal.      Palpations: Abdomen is soft. Musculoskeletal:         General: Normal range of motion. Right lower leg: No edema. Left lower leg: No edema. Skin:     General: Skin is warm and dry. Findings: No bruising. Neurological:      General: No focal deficit present. Mental Status: She is alert and oriented to person, place, and time. Motor: No weakness.    Psychiatric:         Mood and Affect: Mood normal.         Behavior: Behavior normal.          Pertinent labs, diagnostic, device, and imaging results reviewed as a part of this visit    LABS    CBC:   Lab Results   Component Value Date    WBC 7.4 10/15/2021    HGB 13.2 10/15/2021    HCT 39.2 10/15/2021    MCV 97.4 10/15/2021     10/15/2021     BMP:   Lab Results   Component Value Date    CREATININE 0.8 10/15/2021    BUN 25 (H) 10/15/2021     10/15/2021    K 4.0 10/15/2021     10/15/2021    CO2 21 10/15/2021     Estimated Creatinine Clearance: 70 mL/min (based on SCr of 0.8 mg/dL). No results found for: BNP    Thyroid:   Lab Results   Component Value Date    TSH 0.70 10/22/2018     Lipid Panel:   Lab Results   Component Value Date    CHOL 180 2020    HDL 62 2020    HDL 48 2011    TRIG 132 2020     LFTs:  Lab Results   Component Value Date    ALT 33 06/10/2021    AST 18 06/10/2021    ALKPHOS 96 06/10/2021    BILITOT 0.6 06/10/2021     Coags:   Lab Results   Component Value Date    PROTIME 16.2 (H) 2019    INR 1.39 (H) 2019    APTT 45.8 (H) 2021       EC2022   Atrial fibrillation at 96 BPM. Non-specific ST-T wave changes. Echo: 3/27/13  EF 50-55%. Mildly dilated LA. Mild MR, AR, TR. Stress test: 21   Normal myocardial perfusion study.    Normal LV size and systolic function.    Patient in atrial fibrillation.    Overall findings represent a low risk study. EP Procedures:  1.  Atrial fibrillation (PVI, CAFE), left atrial flutter (roofl line, mitral isthmus) and right atrial flutter (CTI) ablation on 10/21/21, Dr. Yoshi Jama:    Persistent atrial fibrillation   - first seen on EKG 3/25/13   - s/p atrial fibrillation ablation on 10/21/21 with Dr. Justa Hendrix   - EKG today with rate controlled A fib   - CHADS2-VASc 3 (gender, HTN, DM) on Xarelto 20mg QD   - discussed treatment options including rate versus rhythm control, she feels better while in sinus so recommended JOAO/cardioversion as she has missed a dose of Xarelto this week, she will see Dr. Justa Hendrix after the procedure to discuss possible repeat ablation    Atrial flutter   - both R and L flutter seen on EP study s/p CTI, roof line and mitral isthmus ablation 10/21/21   - see above    Essential HTN   - controlled   - managed by PCP    Thank you for allowing to us to participate in the care of Vernal Mutters. Follow up with Dr. Gris Barbosa after cardioversion.     JAZMYN Garcia  [facility] [address]  Phone: [phone]  Fax: [phone]    Electronically signed by JAZMYN Le - CNP on 1/24/2022 at 2:31 PM

## 2022-01-24 ENCOUNTER — OFFICE VISIT (OUTPATIENT)
Dept: CARDIOLOGY CLINIC | Age: 62
End: 2022-01-24
Payer: COMMERCIAL

## 2022-01-24 VITALS
HEIGHT: 63 IN | OXYGEN SATURATION: 97 % | HEART RATE: 70 BPM | DIASTOLIC BLOOD PRESSURE: 64 MMHG | WEIGHT: 157 LBS | SYSTOLIC BLOOD PRESSURE: 90 MMHG | BODY MASS INDEX: 27.82 KG/M2

## 2022-01-24 DIAGNOSIS — I48.19 PERSISTENT ATRIAL FIBRILLATION (HCC): Primary | ICD-10-CM

## 2022-01-24 DIAGNOSIS — I48.92 LEFT ATRIAL FLUTTER BY ELECTROCARDIOGRAM (HCC): ICD-10-CM

## 2022-01-24 DIAGNOSIS — I48.92 RIGHT ATRIAL FLUTTER BY ELECTROCARDIOGRAM (HCC): ICD-10-CM

## 2022-01-24 DIAGNOSIS — I48.19 PERSISTENT ATRIAL FIBRILLATION (HCC): ICD-10-CM

## 2022-01-24 DIAGNOSIS — I10 ESSENTIAL HYPERTENSION: Chronic | ICD-10-CM

## 2022-01-24 LAB
ANION GAP SERPL CALCULATED.3IONS-SCNC: 13 MMOL/L (ref 3–16)
BUN BLDV-MCNC: 27 MG/DL (ref 7–20)
CALCIUM SERPL-MCNC: 10.2 MG/DL (ref 8.3–10.6)
CHLORIDE BLD-SCNC: 101 MMOL/L (ref 99–110)
CO2: 25 MMOL/L (ref 21–32)
CREAT SERPL-MCNC: 1 MG/DL (ref 0.6–1.2)
GFR AFRICAN AMERICAN: >60
GFR NON-AFRICAN AMERICAN: 56
GLUCOSE BLD-MCNC: 142 MG/DL (ref 70–99)
HCT VFR BLD CALC: 38.4 % (ref 36–48)
HEMOGLOBIN: 12.8 G/DL (ref 12–16)
MCH RBC QN AUTO: 32.4 PG (ref 26–34)
MCHC RBC AUTO-ENTMCNC: 33.3 G/DL (ref 31–36)
MCV RBC AUTO: 97.2 FL (ref 80–100)
PDW BLD-RTO: 13 % (ref 12.4–15.4)
PLATELET # BLD: 306 K/UL (ref 135–450)
PMV BLD AUTO: 9.1 FL (ref 5–10.5)
POTASSIUM SERPL-SCNC: 4.5 MMOL/L (ref 3.5–5.1)
RBC # BLD: 3.95 M/UL (ref 4–5.2)
SODIUM BLD-SCNC: 139 MMOL/L (ref 136–145)
WBC # BLD: 8.6 K/UL (ref 4–11)

## 2022-01-24 PROCEDURE — 93000 ELECTROCARDIOGRAM COMPLETE: CPT | Performed by: NURSE PRACTITIONER

## 2022-01-24 PROCEDURE — 1036F TOBACCO NON-USER: CPT | Performed by: NURSE PRACTITIONER

## 2022-01-24 PROCEDURE — 3017F COLORECTAL CA SCREEN DOC REV: CPT | Performed by: NURSE PRACTITIONER

## 2022-01-24 PROCEDURE — G8482 FLU IMMUNIZE ORDER/ADMIN: HCPCS | Performed by: NURSE PRACTITIONER

## 2022-01-24 PROCEDURE — G8419 CALC BMI OUT NRM PARAM NOF/U: HCPCS | Performed by: NURSE PRACTITIONER

## 2022-01-24 PROCEDURE — 99214 OFFICE O/P EST MOD 30 MIN: CPT | Performed by: NURSE PRACTITIONER

## 2022-01-24 PROCEDURE — G8427 DOCREV CUR MEDS BY ELIG CLIN: HCPCS | Performed by: NURSE PRACTITIONER

## 2022-01-24 RX ORDER — PROPAFENONE HYDROCHLORIDE 150 MG/1
150 TABLET, FILM COATED ORAL EVERY 8 HOURS
Qty: 90 TABLET | Refills: 1 | Status: SHIPPED | OUTPATIENT
Start: 2022-01-24 | End: 2022-10-25

## 2022-01-24 ASSESSMENT — ENCOUNTER SYMPTOMS
CONSTIPATION: 0
COLOR CHANGE: 0
NAUSEA: 0
COUGH: 0
SORE THROAT: 0
DIARRHEA: 0
ABDOMINAL PAIN: 0
VOMITING: 0
SHORTNESS OF BREATH: 0
BACK PAIN: 0
TROUBLE SWALLOWING: 0
WHEEZING: 0
BLOOD IN STOOL: 0
SINUS PRESSURE: 0

## 2022-01-24 NOTE — PATIENT INSTRUCTIONS
If you have any question regarding your cardioversion please contact Dr. Jonelle Norris at [phone]. JOAO/Cardioversion Pre Procedure Instructions     Date: 1-    Arrive at: 10:00 am    Procedure time: 11:00 am      The morning of your procedure you will park in the [facility] and report directly to the cath lab to check in. At the information desk stay right and go all the way to the end of the tao, this will take you directly to your check in desk for the cath lab. Pre-Procedure Instructions   1. Do not eat or drink anything after midnight the day of your procedure. 2. Take your Xarelto the morning of the procedure with sips of water. 3. You will need to hold all diabetic medications including,glyBURIDE-metFORMIN (GLUCOVANCE)the morning of the procedure. If you take Lantus/Levemir only take ½ your normal dose the evening before. All other medications can be taken in the morning with sips of water. 4. Do not use any lotions, creams or perfume the morning of procedure. 5. Pre-procedure lab work will need to be completed today. 6. Please have a responsible adult to drive you home after procedure. It is recommended you do not drive for 24 hours after procedure and that someone stay with you for precautionary measures. 7. Cath lab will provide you with all post procedure instructions. Patient Education        Learning About Cardioversion  What is cardioversion? Cardioversion is a treatment that helps your heart return to a normal rhythm. It treats problems like atrial fibrillation. It is also sometimes used in emergencies. It can correct a fast heartbeat that causes low blood pressure, chest pain, or heart failure. Cardioversion can be done by using an electric current or medicines. What are the types of cardioversion? There are two types:  · The electrical type uses an electric current.  The current enters your body through patches on your chest or back. · The chemical type uses medicines. The medicine is usually put into your arm through a tube called an IV. Your doctor may ask you to take medicines before the treatment. These help prevent blood clots. Electrical cardioversion  The electrical procedure is done in a hospital. You will get medicine to help you relax and control the pain. Your doctor will put patches on your chest or back. The patches send an electric current to your heart. This resets your heart rhythm. The electrical part takes about 5 minutes. But you will probably be in the hospital for 1 to 2 hours. You will need to recover from the effects of the sedative medicine. Chemical cardioversion  The chemical procedure is most often done in a hospital. In most cases, the medicine is put into your arm through a tube called an IV. But you may get medicines to take by mouth. You may feel a quick sting or pinch when the IV starts. The procedure usually takes about 4 to 8 hours. What can you expect after cardioversion? · You can usually go home the same day. You will need someone to drive you home. · Your doctor may have you take medicines daily. These help your heart beat normally and prevent blood clots. · After electrical cardioversion, you may have redness where the patches were. This looks and feels like a sunburn. · Abnormal heart rhythms sometimes come back after cardioversion. Follow-up care is a key part of your treatment and safety. Be sure to make and go to all appointments, and call your doctor if you are having problems. It's also a good idea to know your test results and keep a list of the medicines you take. Where can you learn more? Go to https://Espresso LogicdenverGigabit Squared.[place]. org and sign in to your V[employer] account. Enter A257 in the [facility] box to learn more about \"Learning About Cardioversion. \"     If you do not have an account, please click on the \"Sign Up Now\" link.   Current as of: April 29, 2021               Content Version: 13.1  © 2006-2021 [employer]. Care instructions adapted under license by [facility] ([facility]). If you have questions about a medical condition or this instruction, always ask your healthcare professional. [address] any warranty or liability for your use of this information. Patient Education        Electrical Cardioversion: Before Your Procedure  What is electrical cardioversion? Electrical cardioversion is a treatment for a heartbeat that isn't normal, such as atrial fibrillation. It uses a brief electric shock to reset your heart's rhythm. Before the treatment, you will get medicine to make you sleepy. You should not feel any pain. Your doctor will put patches on your chest. Or you might get them on both your chest and back. They send a brief electric current to your heart. In most cases, this restores the heart's normal rhythm right away. Cardioversion itself takes about 5 minutes. But the whole procedure will likely take about 30 to 45 minutes. That includes time to recover. Abnormal heart rhythms sometimes come back after the treatment. You may need to take medicines. These may help your heart keep its normal rhythm. Follow-up care is a key part of your treatment and safety. Be sure to make and go to all appointments, and call your doctor if you are having problems. It's also a good idea to know your test results and keep a list of the medicines you take. How do you prepare for the procedure? Procedures can be stressful. This information will help you understand what you can expect. And it will help you safely prepare for your procedure. Preparing for the procedure    · Be sure you have someone to take you home.  Anesthesia and pain medicine will make it unsafe for you to drive or get home on your own.     · Understand exactly what procedure is planned, along with the risks, benefits, and other options.     · If you take aspirin or some other blood thinner, ask your doctor if you should stop taking it before your procedure. Make sure that you understand exactly what your doctor wants you to do. These medicines increase the risk of bleeding.     · Tell your doctor ALL the medicines, vitamins, supplements, and herbal remedies you take. Some may increase the risk of problems during your procedure. Your doctor will tell you if you should stop taking any of them before the procedure and how soon to do it.     · Make sure your doctor and the hospital have a copy of your advance directive. If you don't have one, you may want to prepare one. It lets others know your health care wishes. It's a good thing to have before any type of surgery or procedure. What happens on the day of the procedure? · Follow the instructions exactly about when to stop eating and drinking. If you don't, your procedure may be canceled. If your doctor told you to take your medicines on the day of the procedure, take them with only a sip of water.     · Take a bath or shower before you come in for your procedure. Do not apply lotions, perfumes, deodorants, or nail polish.     · Take off all jewelry and piercings. And take out contact lenses, if you wear them. At the hospital or surgery center   · Bring a picture ID.     · You will get medicine to make you sleepy.     · The procedure will take about 30 to 45 minutes. When should you call your doctor? · You have questions or concerns.     · You don't understand how to prepare for your procedure.     · You become ill before the procedure (such as fever, flu, or a cold).     · You need to reschedule or have changed your mind about having the procedure. Where can you learn more? Go to https://vidal.Mandelbrot Project. org and sign in to your [employer] account. Enter S549 in the [employer] box to learn more about \"Electrical Cardioversion: Before Your Procedure. \"     If you do not have an account, please click on the \"Sign Up Now\" link. Current as of: April 29, 2021               Content Version: 13.1  © 2006-2021 [employer]. Care instructions adapted under license by [facility] ([facility]). If you have questions about a medical condition or this instruction, always ask your healthcare professional. [address] any warranty or liability for your use of this information. Patient Education        Electrical Cardioversion: What to Expect at Home  Your Recovery     Electrical cardioversion is a treatment for an abnormal heartbeat, such as atrial fibrillation, supraventricular tachycardia, or ventricular tachycardia (VT). Your doctor used a brief electrical shock to reset your heart's rhythm. After the procedure, you may have redness, like a sunburn, where the patches were. The medicines you got to make you sleepy may make you feel drowsy for the rest of the day. Your doctor may have you take medicines to help the heart beat normally and to prevent blood clots. This care sheet gives you a general idea about how long it will take for you to recover. But each person recovers at a different pace. Follow the steps below to feel better as quickly as possible. How can you care for yourself at home? Medicines    · Be safe with medicines. Take your medicines exactly as prescribed. Call your doctor if you think you are having a problem with your medicine. You may take one or more of the following medicines:  ? Rate-control medicines to slow the heart rate. These include beta-blockers, calcium channel blockers, and digoxin. ? Rhythm control medicines that help the heart keep a normal rhythm. ? Blood thinners, also called anticoagulants, which help prevent blood clots. You will get more details on the specific medicines your doctor prescribes.  Be sure you know how to take your medicines safely.     · Do not take any vitamins, over-the-counter medicines, or herbal products without talking to your doctor first.   Exercise    · Start light exercise if your doctor says that it's okay. Even a small amount will help you get stronger, have more energy, and manage your stress. Walking is an easy way to get exercise. Start out by walking a little more than you did in the hospital. Bit by bit, increase the amount you walk.     · When you exercise, watch for signs that your heart is working too hard. You are pushing too hard if you cannot talk while you are exercising. If you become short of breath or dizzy or have chest pain, sit down and rest right away.     · Check your pulse regularly. Place two fingers on the artery at the palm side of your wrist in line with your thumb. If your heartbeat seems uneven or fast, talk to your doctor. Other instructions    · Ask your doctor when you can drive again.     · Do not smoke. If you need help quitting, talk to your doctor about stop-smoking programs and medicines. These can increase your chances of quitting for good.     · Limit alcohol. Follow-up care is a key part of your treatment and safety. Be sure to make and go to all appointments, and call your doctor if you are having problems. It's also a good idea to know your test results and keep a list of the medicines you take. When should you call for help? Call 911 anytime you think you may need emergency care. For example, call if:    · You passed out (lost consciousness).     · You have chest pain or pressure. This may occur with:  ? Sweating. ? Shortness of breath. ? Nausea or vomiting. ? Pain that spreads from the chest to the neck, jaw, or one or both shoulders or arms. ? A fast or uneven pulse. After calling 911, the  may tell you to chew 1 adult-strength or 2 to 4 low-dose aspirin. Wait for an ambulance. Do not try to drive yourself.     · You have symptoms of a stroke.  These may include:  ? Sudden numbness, tingling, weakness, or loss of movement in your face, arm, or leg, especially on only one side of your body. ? Sudden vision changes. ? Sudden trouble speaking. ? Sudden confusion or trouble understanding simple statements. ? Sudden problems with walking or balance. ? A sudden, severe headache that is different from past headaches. Call your doctor now or seek immediate medical care if:    · You feel dizzy or lightheaded, or you feel like you may faint.     · You have a fast or irregular heartbeat. Watch closely for any changes in your health, and be sure to contact your doctor if you have any problems. Where can you learn more? Go to https://White SourcepeAltatecheb.[employer]. org and sign in to your [employer] account. Enter A617 in the [employer] box to learn more about \"Electrical Cardioversion: What to Expect at Home. \"     If you do not have an account, please click on the \"Sign Up Now\" link. Current as of: April 29, 2021               Content Version: 13.1  © 2006-2021 Healthwise, Incorporated. Care instructions adapted under license by [facility] ([facility]). If you have questions about a medical condition or this instruction, always ask your healthcare professional. [place] [address] any warranty or liability for your use of this information.

## 2022-01-28 ENCOUNTER — HOSPITAL ENCOUNTER (OUTPATIENT)
Dept: CARDIAC CATH/INVASIVE PROCEDURES | Age: 62
Discharge: HOME | End: 2022-01-28
Payer: COMMERCIAL

## 2022-01-28 VITALS
DIASTOLIC BLOOD PRESSURE: 85 MMHG | HEIGHT: 63 IN | OXYGEN SATURATION: 98 % | TEMPERATURE: 97.7 F | SYSTOLIC BLOOD PRESSURE: 139 MMHG | RESPIRATION RATE: 21 BRPM | HEART RATE: 86 BPM | BODY MASS INDEX: 27.64 KG/M2 | WEIGHT: 156 LBS

## 2022-01-28 LAB
EKG ATRIAL RATE: 71 BPM
EKG DIAGNOSIS: NORMAL
EKG P AXIS: 48 DEGREES
EKG P-R INTERVAL: 192 MS
EKG Q-T INTERVAL: 424 MS
EKG QRS DURATION: 88 MS
EKG QTC CALCULATION (BAZETT): 460 MS
EKG R AXIS: -5 DEGREES
EKG T AXIS: 37 DEGREES
EKG VENTRICULAR RATE: 71 BPM
SARS-COV-2, NAAT: NOT DETECTED

## 2022-01-28 PROCEDURE — 93005 ELECTROCARDIOGRAM TRACING: CPT | Performed by: INTERNAL MEDICINE

## 2022-01-28 PROCEDURE — 93010 ELECTROCARDIOGRAM REPORT: CPT | Performed by: INTERNAL MEDICINE

## 2022-01-28 PROCEDURE — 93325 DOPPLER ECHO COLOR FLOW MAPG: CPT

## 2022-01-28 PROCEDURE — 87635 SARS-COV-2 COVID-19 AMP PRB: CPT

## 2022-01-28 PROCEDURE — 99152 MOD SED SAME PHYS/QHP 5/>YRS: CPT | Performed by: INTERNAL MEDICINE

## 2022-01-28 PROCEDURE — 2500000003 HC RX 250 WO HCPCS

## 2022-01-28 PROCEDURE — 2580000003 HC RX 258

## 2022-01-28 PROCEDURE — 93312 ECHO TRANSESOPHAGEAL: CPT

## 2022-01-28 PROCEDURE — 92960 CARDIOVERSION ELECTRIC EXT: CPT | Performed by: INTERNAL MEDICINE

## 2022-01-28 PROCEDURE — 6360000002 HC RX W HCPCS

## 2022-01-28 RX ORDER — SODIUM CHLORIDE 0.9 % (FLUSH) 0.9 %
5-40 SYRINGE (ML) INJECTION PRN
Status: DISCONTINUED | OUTPATIENT
Start: 2022-01-28 | End: 2022-01-29 | Stop reason: HOSPADM

## 2022-01-28 RX ORDER — SODIUM CHLORIDE 0.9 % (FLUSH) 0.9 %
5-40 SYRINGE (ML) INJECTION EVERY 12 HOURS SCHEDULED
Status: DISCONTINUED | OUTPATIENT
Start: 2022-01-28 | End: 2022-01-29 | Stop reason: HOSPADM

## 2022-01-28 RX ORDER — SODIUM CHLORIDE 9 MG/ML
25 INJECTION, SOLUTION INTRAVENOUS PRN
Status: DISCONTINUED | OUTPATIENT
Start: 2022-01-28 | End: 2022-01-29 | Stop reason: HOSPADM

## 2022-01-28 RX ORDER — SODIUM CHLORIDE 9 MG/ML
INJECTION, SOLUTION INTRAVENOUS CONTINUOUS
Status: DISCONTINUED | OUTPATIENT
Start: 2022-01-28 | End: 2022-01-29 | Stop reason: HOSPADM

## 2022-01-28 NOTE — H&P
H&P Update    I have reviewed the history and physical from CORTEZ Gamez NP, (see below) and examined the patient and find no relevant changes. I have reviewed with the patient and/or family the risks, benefits, and alternatives to the procedure. Pre-sedation Assessment    Patient:  Irene Hollis   :   1960  Intended Procedure: JOAO/Cardioversion      Wes Streeter nurses notes reviewed and agreed. Medications reviewed  Allergies: Allergies   Allergen Reactions    Codeine          Pre-Procedure Assessment/Plan:  ASA 2 - Patient with mild systemic disease with no functional limitations    Level of Sedation Plan: Moderate sedation    Post Procedure plan: Return to same level of care    --------------------------------      [address]   Electrophysiology        Date: 2022     Primary Cardiologist: Betina Barbosa MD  PCP: Jaylyn Champion MD      Chief Complaint:        Chief Complaint   Patient presents with    3 Month Follow-Up       No cc      History of Present Illness:     I saw Irene Hollis in the office for electrophysiology follow up today. She is a 64 y.o. female with a past medical history of atrial fibrillation, type II diabetes mellitus, mixed hyperlipidemia and essential hypertension. She underwent atrial fibrillation (PVI, CAFE), left atrial flutter (roofl line, mitral isthmus) and right atrial flutter (CTI) ablation on 10/21/21 with Dr. Jocelyne Kwon. She was started on flecainide 50mg BID and metoprolol was decreased to 25mg BID. Flecainide was changed to Rythmol due to side effects. She presents today for procedure follow up.     She was feeling fairly well following her ablation but about 2-3 weeks ago she felt like she went back into atrial fibrillation. She feels like she doesn't have \"relaxed breathing\" when going to sleep at night while in A fib. Denies any actual palpitations. No chest pain, edema or syncope. No bleeding problems.  She does occasionally realize she has missed a dose of Xarelto and found the pill on her dresser today which was yesterday's dose.     Allergies: Allergies   Allergen Reactions    Codeine        Home Medications:        Prior to Visit Medications    Medication Sig Taking?  Authorizing Provider   propafenone (RYTHMOL) 150 MG tablet Take 1 tablet by mouth every 8 hours Yes JAZMYN Carlisle - CNP   atorvastatin (LIPITOR) 40 MG tablet Take 1 tablet by mouth daily Yes Una Zheng, MD   glyBURIDE-metFORMIN (GLUCOVANCE) 5-500 MG per tablet Take 1 tablet by mouth twice a day Yes Una Morning, MD   metoprolol tartrate (LOPRESSOR) 25 MG tablet Take 1 tablet by mouth 2 times daily Yes Una Morning, MD   rivaroxaban (XARELTO) 20 MG TABS tablet TAKE ONE TABLET BY MOUTH DAILY Yes Una Morning, MD   valsartan-hydroCHLOROthiazide (DIOVAN-HCT) 160-12.5 MG per tablet Take 1 tablet by mouth daily Yes Una Morning, MD   lidocaine (XYLOCAINE) 5 % ointment   Yes Historical Provider, MD   valACYclovir (VALTREX) 500 MG tablet   Yes Historical Provider, MD   dicyclomine (BENTYL) 20 MG tablet Take 20 mg by mouth 4 times daily as needed Yes Historical Provider, MD   Apremilast 30 MG TABS Take 30 mg by mouth daily  Yes Historical Provider, MD         Past Medical History:  Past Medical History        Past Medical History:   Diagnosis Date    Atrial fibrillation, controlled ([facility] [address] 3/27/2013    Essential hypertension 11/1/2011    Melanoma ([facility] [address]      Mixed hyperlipidemia 11/1/2011    Psoriasis      Type II or unspecified type diabetes mellitus without mention of complication, not stated as uncontrolled              Past Surgical History:   Past Surgical History         Past Surgical History:   Procedure Laterality Date    BREAST REDUCTION SURGERY   2017    COLONOSCOPY N/A 10/23/2020     COLONOSCOPY WITH BIOPSY performed by [facility]ce [facility] at [facility] 12/28/2020    LUMBAR DISC SURGERY        ROTATOR CUFF REPAIR Right December 2014     Dr. Jason Loco at [facility]            Social History:   reports that she has never smoked. She has never used smokeless tobacco. She reports current alcohol use. She reports that she does not use drugs.      Family History:  Family History             Problem Relation Age of Onset    Other Mother      Heart Disease Mother      Heart Disease Father      Atrial Fibrillation Father      Cancer Sister              Review of Systems   Constitutional: Negative for chills, fatigue, fever and unexpected weight change. HENT: Negative for congestion, hearing loss, sinus pressure, sore throat and trouble swallowing. Respiratory: Negative for cough, shortness of breath and wheezing. Cardiovascular: Negative for chest pain, palpitations and leg swelling. Gastrointestinal: Negative for abdominal pain, blood in stool, constipation, diarrhea, nausea and vomiting. Genitourinary: Negative for hematuria. Musculoskeletal: Negative for arthralgias, back pain, gait problem and myalgias. Skin: Negative for color change, rash and wound. Neurological: Negative for dizziness, seizures, syncope, speech difficulty, weakness and light-headedness. Hematological: Does not bruise/bleed easily.         Physical Examination:      Vitals:     01/24/22 1331   BP: 90/64   Pulse: 70   SpO2: 97%          Wt Readings from Last 3 Encounters:   01/24/22 157 lb (71.2 kg)   10/28/21 156 lb 9.6 oz (71 kg)   10/21/21 157 lb (71.2 kg)         Physical Exam  Vitals reviewed. Constitutional:       General: She is not in acute distress. Appearance: Normal appearance. HENT:      Head: Normocephalic and atraumatic. Nose: Nose normal.      Mouth/Throat:      Mouth: Mucous membranes are moist.   Eyes:      Conjunctiva/sclera: Conjunctivae normal.      Pupils: Pupils are equal, round, and reactive to light.    Cardiovascular:      Rate and Rhythm: Normal rate. Rhythm irregularly irregular. Heart sounds: No murmur heard. No friction rub. No gallop. Pulmonary:      Effort: No respiratory distress. Breath sounds: No wheezing, rhonchi or rales. Abdominal:      General: Abdomen is flat. Bowel sounds are normal.      Palpations: Abdomen is soft. Musculoskeletal:         General: Normal range of motion. Right lower leg: No edema. Left lower leg: No edema. Skin:     General: Skin is warm and dry. Findings: No bruising. Neurological:      General: No focal deficit present. Mental Status: She is alert and oriented to person, place, and time. Motor: No weakness. Psychiatric:         Mood and Affect: Mood normal.         Behavior: Behavior normal.            Pertinent labs, diagnostic, device, and imaging results reviewed as a part of this visit     LABS     CBC:         Lab Results   Component Value Date     WBC 7.4 10/15/2021     HGB 13.2 10/15/2021     HCT 39.2 10/15/2021     MCV 97.4 10/15/2021      10/15/2021      BMP:         Lab Results   Component Value Date     CREATININE 0.8 10/15/2021     BUN 25 (H) 10/15/2021      10/15/2021     K 4.0 10/15/2021      10/15/2021     CO2 21 10/15/2021      Estimated Creatinine Clearance: 70 mL/min (based on SCr of 0.8 mg/dL).    No results found for: BNP     Thyroid:         Lab Results   Component Value Date     TSH 0.70 10/22/2018      Lipid Panel:         Lab Results   Component Value Date     CHOL 180 2020     HDL 62 2020     HDL 48 2011     TRIG 132 2020      LFTs:        Lab Results   Component Value Date     ALT 33 06/10/2021     AST 18 06/10/2021     ALKPHOS 96 06/10/2021     BILITOT 0.6 06/10/2021      Coags:         Lab Results   Component Value Date     PROTIME 16.2 (H) 2019     INR 1.39 (H) 2019     APTT 45.8 (H) 2021         EC2022   Atrial fibrillation at 96 BPM. Non-specific ST-T wave changes.     Echo: 3/27/13  EF 50-55%. Mildly dilated LA. Mild MR, AR, TR.     Stress test: 6/14/21   Normal myocardial perfusion study.    Normal LV size and systolic function.    Patient in atrial fibrillation.    Overall findings represent a low risk study.      EP Procedures:  1.  Atrial fibrillation (PVI, CAFE), left atrial flutter (roofl line, mitral isthmus) and right atrial flutter (CTI) ablation on 10/21/21, Dr. Juan Carter:     Persistent atrial fibrillation              - first seen on EKG 3/25/13              - s/p atrial fibrillation ablation on 10/21/21 with Dr. Keyla Jauregui              - EKG today with rate controlled A fib              - CHADS2-VASc 3 (gender, HTN, DM) on Xarelto 20mg QD              - discussed treatment options including rate versus rhythm control, she feels better while in sinus so recommended JOAO/cardioversion as she has missed a dose of Xarelto this week, she will see Dr. Keyla Jauregui after the procedure to discuss possible repeat ablation     Atrial flutter              - both R and L flutter seen on EP study s/p CTI, roof line and mitral isthmus ablation 10/21/21              - see above     Essential HTN              - controlled              - managed by PCP     Thank you for allowing to us to participate in the care of Adalberto Godwin.     Follow up with Dr. Keyla Jauregui after cardioversion.  Claude Hearn [address], APRN  The [address], [address] [facility]  Phone: [phone]  Fax:     [phone]

## 2022-01-28 NOTE — PRE SEDATION
Sedation Pre-Procedure Note    Patient Name: Nelson Mckeon   YOB: 1960  Room/Bed: Room/bed info not found  Medical Record Number: [id number]  Date: 1/28/2022   Time: 8:08 AM       Indication:  Persistent atrial fibrillation    Consent: I have discussed with the patient and/or the patient representative the indication, alternatives, and the possible risks and/or complications of the planned procedure and the anesthesia methods. The patient and/or patient representative appear to understand and agree to proceed. Vital Signs: There were no vitals filed for this visit. Past Medical History:   has a past medical history of Atrial fibrillation, controlled ([facility] [address] Essential hypertension, Melanoma ([facility] [address] Mixed hyperlipidemia, Psoriasis, and Type II or unspecified type diabetes mellitus without mention of complication, not stated as uncontrolled. Past Surgical History:   has a past surgical history that includes Rotator cuff repair (Right, December 2014); Lumbar disc surgery; Breast reduction surgery (2017); joint replacement (Right, 12/28/2020); and Colonoscopy (N/A, 10/23/2020). Medications:   Scheduled Meds:   Continuous Infusions:   PRN Meds:   Home Meds:   Prior to Admission medications    Medication Sig Start Date End Date Taking?  Authorizing Provider   propafenone (RYTHMOL) 150 MG tablet Take 1 tablet by mouth every 8 hours 1/24/22   Sophia Kayser, APRN - CNP   atorvastatin (LIPITOR) 40 MG tablet Take 1 tablet by mouth daily 11/29/21   Hal Moreno MD   glyBURIDE-metFORMIN (GLUCOVANCE) 5-500 MG per tablet Take 1 tablet by mouth twice a day 11/29/21   Hal Moreno MD   metoprolol tartrate (LOPRESSOR) 25 MG tablet Take 1 tablet by mouth 2 times daily 11/29/21   Hal Moreno MD   rivaroxaban (XARELTO) 20 MG TABS tablet TAKE ONE TABLET BY MOUTH DAILY 11/29/21   Hal Moreno MD   valsartan-hydroCHLOROthiazide (DIOVAN-HCT) 160-12.5 MG per tablet Take 1 tablet by mouth daily 11/29/21 2/27/22  Tanna Tapia MD   lidocaine (XYLOCAINE) 5 % ointment  10/26/20   Historical Provider, MD   valACYclovir (VALTREX) 500 MG tablet  10/26/20   Historical Provider, MD   dicyclomine (BENTYL) 20 MG tablet Take 20 mg by mouth 4 times daily as needed    Historical Provider, MD   Apremilast 30 MG TABS Take 30 mg by mouth daily  4/19/18   Historical Provider, MD     Coumadin Use Last 7 Days:  no  Antiplatelet drug therapy use last 7 days: no  Other anticoagulant use last 7 days: yes - Xarelto  Additional Medication Information: n/a      Pre-Sedation Documentation and Exam:   I have personally completed a history, physical exam & review of systems for this patient (see notes).     Mallampati Airway Assessment:  Mallampati Class I - (soft palate, fauces, uvula & anterior/posterior tonsillar pillars are visible)    Prior History of Anesthesia Complications:   none    ASA Classification:  Class 2 - A normal healthy patient with mild systemic disease    Sedation/ Anesthesia Plan:   intravenous sedation    Medications Planned:   midazolam (Versed) intravenously, fentanyl intravenously and Methohexital    Patient is an appropriate candidate for plan of sedation: yes    Electronically signed by Emily Carrasquillo MD on 1/28/2022 at 8:08 AM

## 2022-01-28 NOTE — PROCEDURES
[address]     Electrophysiology Procedure Note       Date of Procedure: 1/28/2022  Patient's Name: Veronica Banks  YOB: 1960   Medical Record Number: [id number]  Referring Physician: Britni att. providers found  Procedure Performed by: Suellen [place] MD    Procedures performed:  · Anesthesia: Monitored Anesthesia Care  · Level of sedation plan: Moderate sedation (conscious sedation) with intravenous Midazolam 4 mg, Fentanyl 100 mcg, and Methohexital 20 mg  · Sedation start time: 1144  · Sedation stop time: 1150  · Mallampati airway assessment class: I  · ASA class: 1  · Trans-esophageal echocardiography  · External Electrical cardioversion     Indication of the procedure: Symptomatic, persistent left atrial flutter     Details of procedure: The patient was brought to the cath lab area in a fasting and non-sedated state. The risks, benefits and alternatives of the procedure were discussed with the patient. The risks including, but not limited to, the risks of vascular injury, bleeding, infection, any pre-existing cardiac implantable electronic device malfunction, any pre-existing cardiac implantable electronic device's lead dislodgement, injury to cardiac and surrounding structures (including pneumothorax), stroke, myocardial infarction and death were discussed in detail. The patient was also counseled at length about the risks of melba Covid-19 in the lico-operative and post-operative states including the recovery window of their procedure. The patient was made aware that melba Covid-19 after a surgical procedure may worsen their prognosis for recovering from the virus and lend to a higher morbidity and or mortality risk. The patient was given the option of postponing their procedure. The patient was also presented reasonable alternatives to the proposed care, treatment, and services.  The discussion I have had with the patient encompassed risks, benefits, and side effects related to the alternatives and the risks related to not receiving the proposed care, treatment and services. The patient opted to proceed with the procedure. Written informed consent was signed and placed in the chart. A timeout protocol was completed to identify the patient and the procedure being performed. An independent trained observer assumed the sole responsibility of administering IV sedation medication - Versed, Fentanyl - at my direction and closely monitored the patient. A JOAO was performed which did not show any KHAI/LA clot/thrombus. Full JOAO reports will be dictated. Patient is on chronic anticoagulation therapy but missed a dose in the last 3 weeks. The patient was monitored continuously with ECG, pulse oximetry, blood pressure monitoring, and direct observation. We then administered intravenous Methohexital for sedation, and electrical DC cardioversion was performed using 200J, synchronized shock. Patient was converted to sinus rhythm immediately. Specimen collected: none       The patient tolerated the procedure well and there were no complications. Conclusion:   Successful external DC cardioversion of symptomatic, persistent left atrial flutter. Plan:   The patient can be discharged if remains stable. Will continue with pre-admission Rythmol 150mg po TID, pre-admission Metoprolol 25mg po BID, and pre-admission Xarelto 20mg daily. The patient will follow-up with Dr. Cassidy Menednez as an outpatient to discuss other therapeutic options for recurrent atrial fibrillation after her 10/2021 afib ablation, namely atrial fibrillation re-ablation. Thank you for allowing me to participate in the care of this patient. If you have any questions, please feel free to contact me.     Av Lucero MD, MS, [facility]  Cardiac Electrophysiology  [address]  [address] [facility], [address] [place] [address]  [place] [facility] [phone]

## 2022-02-25 NOTE — PROGRESS NOTES
Cardiac Electrophysiology Consultation   Date: 2/28/2022  Reason for Consultation: Persistent atrial fibrillation  Consult Requesting Physician:  Edinson Boswell MD  Primary Care Physician: Yury Livingston MD    Chief Complaint:   Chief Complaint   Patient presents with    Follow-up     no cc       HPI: Adalberto Godwin is a 64 y.o. patient with a history of atrial fibrillation, type II diabetes mellitus, mixed hyperlipidemia and essential hypertension. She was seen in office on 7/13/2020 and afib ablation was discussed as a treatment options she did not decide to proceed with the ablation at that time. She stated that she was asymptomatic with her atrial fibrillation and was unaware she had it before she was diagnosed. She was seen in office on 8/23/2021 to establish care and discuss treatment options for her atrial fibrillation. She did not endorse any identifiable exacerbating or alleviating factors for the atrial fibrillation. On 10/21/2021 she underwent atrial fibrillation (PVI, CAFE), left atrial flutter (roofl line, mitral isthmus) and right atrial flutter (CTI) ablation. She was started on flecainide 50mg BID and metoprolol was decreased to 25mg BID. Flecainide was changed to Rythmol due to side effects  She was seen in office on 1/24/2022 by Mike Santoro CNP for her 3 month post ablation follow up and EKG showed atrial fibrillation rate controlled at 96 bpm.    On 1/28/2022 she underwent successful external DC cardioversion of symptomatic, persistent left atrial flutter. Interval History: Today, she presents to office for management of her atrial fibrillation. EKG today shows atrial fibrillation rate controlled at 91 bpm. She states she was first told she had atrial fibrillation 10-15 years ago.  She denies having any symptoms today but states she did have symptoms when she was in atrial fibrillation in the past. She states that she had difficulty sleeping when she was in atrial fibrillation in the past.     Past Medical History:   Diagnosis Date    Atrial fibrillation, controlled ([facility] 3/27/2013    Essential hypertension 11/1/2011    Melanoma ([facility] [address]     Mixed hyperlipidemia 11/1/2011    Psoriasis     Type II or unspecified type diabetes mellitus without mention of complication, not stated as uncontrolled         Past Surgical History:   Procedure Laterality Date    BREAST REDUCTION SURGERY  2017    COLONOSCOPY N/A 10/23/2020    COLONOSCOPY WITH BIOPSY performed by Samina Jensen MD at [facility] 12/28/2020    LUMBAR [facility] December 2014    Dr. Anju Hughes at [facility]       Allergies: Allergies   Allergen Reactions    Codeine        Medication:   Prior to Admission medications    Medication Sig Start Date End Date Taking?  Authorizing Provider   propafenone (RYTHMOL) 150 MG tablet Take 1 tablet by mouth every 8 hours 1/24/22  Yes Hershall Mosque, APRN - CNP   atorvastatin (LIPITOR) 40 MG tablet Take 1 tablet by mouth daily 11/29/21  Yes Dre Nolan MD   glyBURIDE-metFORMIN (GLUCOVANCE) 5-500 MG per tablet Take 1 tablet by mouth twice a day 11/29/21  Yes rDe Nolan MD   metoprolol tartrate (LOPRESSOR) 25 MG tablet Take 1 tablet by mouth 2 times daily 11/29/21  Yes Dre Nolan MD   rivaroxaban (XARELTO) 20 MG TABS tablet TAKE ONE TABLET BY MOUTH DAILY 11/29/21  Yes Dre Nolan MD   lidocaine (XYLOCAINE) 5 % ointment  10/26/20  Yes Historical Provider, MD   valACYclovir (VALTREX) 500 MG tablet  10/26/20  Yes Historical Provider, MD   dicyclomine (BENTYL) 20 MG tablet Take 20 mg by mouth 4 times daily as needed   Yes Historical Provider, MD   Apremilast 30 MG TABS Take 30 mg by mouth daily  4/19/18  Yes Historical Provider, MD   valsartan-hydroCHLOROthiazide (DIOVAN-HCT) 160-12.5 MG per tablet Take 1 tablet by mouth daily 11/29/21 2/27/22  Raul Tanika Ruano MD       Social History:   reports that she has never smoked. She has never used smokeless tobacco. She reports current alcohol use. She reports that she does not use drugs. Family History:  family history includes Atrial Fibrillation in her father; Cancer in her sister; Heart Disease in her father and mother; Other in her mother. Reviewed. Denies family history of sudden cardiac death, arrhythmia, premature CAD    Review of System:    · General ROS: negative for - chills, fever   · Psychological ROS: negative for - anxiety or depression  · Ophthalmic ROS: negative for - eye pain or loss of vision  · ENT ROS: negative for - epistaxis, headaches, nasal discharge, sore throat   · Allergy and Immunology ROS: negative for - hives, nasal congestion   · Hematological and Lymphatic ROS: negative for - bleeding problems, blood clots, bruising or jaundice  · Endocrine ROS: negative for - skin changes, temperature intolerance or unexpected weight changes  · Respiratory ROS: negative for - cough, hemoptysis, pleuritic pain, SOB, sputum changes or wheezing  · Cardiovascular ROS: Per HPI. · Gastrointestinal ROS: negative for - abdominal pain, blood in stools, diarrhea, hematemesis, melena, nausea/vomiting or swallowing difficulty/pain  · Genito-Urinary ROS: negative for - dysuria or incontinence  · Musculoskeletal ROS: negative for - joint swelling or muscle pain  · Neurological ROS: negative for - confusion, dizziness, gait disturbance, headaches, numbness/tingling, seizures, speech problems, tremors, visual changes or weakness  · Dermatological ROS: negative for - rash    Physical Examination:  Vitals:    02/28/22 1405   BP: 116/66   Pulse: 93   SpO2: 100%       · Constitutional: Oriented. No distress. · Head: Normocephalic and atraumatic. · Mouth/Throat: Oropharynx is clear and moist.   · Eyes: Conjunctivae normal. EOM are normal.   · Neck: Normal range of motion. Neck supple. No rigidity.   No JVD present. · Cardiovascular: Normal rate, irregular rhythm, S1&S2 and intact distal pulses. · Pulmonary/Chest: Bilateral respiratory sounds. No wheezes. No rhonchi. · Abdominal: Soft. Bowel sounds present. No distension, No tenderness. · Musculoskeletal: No tenderness. No edema    · Lymphadenopathy: Has no cervical adenopathy. · Neurological: Alert and oriented. Cranial nerve appears intact, No Gross deficit   · Skin: Skin is warm and dry. No rash noted. · Psychiatric: Has a normal mood, affect and behavior     Labs:  Reviewed. ECG: Atrial fibrillation with v-rate of 91 bpm with QRS duration 94 ms. No pathologic Q waves, ventricular pre-excitation, or QT prolongation. Studies:   1. Event monitor:   none    2. Echo: 3/27/2013  LVEF 50-55 %  The left ventricular wall motion is normal.  The left atrium is mildly dilated. Mild mitral regurgitation. Mild aortic regurgitation. No pulmonic valve regurgitation. 3. Stress Test:  6/14/2021  Summary    Normal myocardial perfusion study.    Normal LV size and systolic function.    Patient in atrial fibrillation.    Overall findings represent a low risk study. 4. Cath: n/a    I independently reviewed and interpreted the ECG, MCOT, echocardiogram, stress test, and coronary angiography/PCI results and used them for my plan of care. Procedures:  1. Electrophysiology study with radiofrequency ablation of atrial fibrillation & left atrial flutter and pulmonary vein isolation 10/21/2022.  2. Successful external DC cardioversion of symptomatic, persistent left atrial flutter. 1/28/2022.     Assessment/Plan:     Persistent Atrial fibrillation and Atrial flutter   -EKG today shows atrial fibrillation with RVR, v-rate 109 bpm.  -First seen on EKG 3/25/2013 and every EKG in Epic shows afib /aflutter has persisted since that time.  -She has a [id number] Score 3 (HTN, DM, GENDER)  -On Eliquis 5 mg BID for  thromboembolic risk reduction.  -Tolerating well no signs or symptoms of abnormal bruising or bleeding.  -She was placed on Flecainide s/p ablation but did tolerate the medication so it was changed to propafenone 150 mg Q8H.    - Treatment options for atrial fibrillation including cardioversion, anti-arrhythmic medication therapy, rate control strategy, oral anticoagulation, and atrial fibrillation ablation were discussed with patient. Risks, benefits and alternative of each treatment options (care, treatment, and services) were explained. The patient was also presented reasonable alternatives to the proposed care, treatment, and services. The discussion I have had with the patient encompassed risks, benefits, and side effects related to the alternatives and the risks related to not receiving the proposed care, treatment and services. All questions were answered.     -I recommended that she undergo CV to restore NSR while awaiting for the ablation. Patient agreeable. We educated the patient that left atrial flutter/ atrial fibrillation is a worsening and progressive disease, with more frequent episodes that will ensue. Subsequent episodes usually become more sustained to the extent that many individuals would then develop persistent left atrial flutter. Once persistence is reached, permanent left atrial flutter /atrial fibrillation is inevitable. We also discussed the fact that left atrial flutter is associated with stroke, including life-threatening stroke, and therefore oral anticoagulation is warranted depending on the patient's [id number] score. We discussed different management options for left atrial flutter/ atrial fibrillation including their risks and benefits. These options include use of cardioversion which provides an effective immediate therapy with success rates of 90% or higher, but it provides no short nor long term efficacy.  Anti-arrhythmic medications has been very difficult to control left atrial flutter, both in regards to heart rate and rhythm control even with a powerful anti-arrhythmic medication (amiodarone). Left atrial flutter ablation is a potentially curative therapy with very reasonable success rate. The risks, benefits and alternatives of the left atrial flutter/ atrial fibrillation ablation procedure were discussed with the patient. The risks including, but not limited to, bleeding, infection, radiation exposure, injury to vascular, cardiac and surrounding structures (including pneumothorax), stroke, cardiac perforation, tamponade, need for emergent open heart surgery, need for pacemaker implantation, injury to the phrenic nerve, injury to the esophagus, myocardial infarction and death were discussed in detail. The patient was also counseled at length about the risks of melba Covid-19 in the lico-operative and post-operative states including the recovery window of their procedure. The patient was made aware that melba Covid-19 after a surgical procedure may worsen their prognosis for recovering from the virus and lend to a higher morbidity and or mortality risk. The patient was given the option of postponing their procedure. The patient was also presented reasonable alternatives to the proposed care, treatment, and services. The discussion I have had with the patient encompassed risks, benefits, and side effects related to the alternatives and the risks related to not receiving the proposed care, treatment and services. I spent 40 minutes face to face with the patient, with greater than 50% of that time spent in counseling on the above. The patient opted to proceed with the left atrial flutter /atrial fibrillation ablation. We will schedule for a radiofrequency ablation with [employer] system with a JOAO procedure immediately prior to this ablation. We will hold Xarelto for 12 hours prior to procedure. We will order BMP, CBC, PT/INR, and Type & Screen prior to the procedure.        Follow up three months after procedure with Nevaeh Alvarez CNP. Thank you for allowing me to participate in the care of Judy Montejo. All questions and concerns were addressed to the patient/family. Alternatives to my treatment were discussed. This note was scribed in the presence of Dr. Vicente Villeda MD by Jn Galicia, MAXIMILIAN. The scribe's documentation has been prepared under my direction and personally reviewed by me in its entirety. I confirm that the note above accurately reflects all work, physical examination, the discussion of treatments and procedures, and medical decision making performed by me.     Vicente Villeda MD, MS, [facility], [facility]  Cardiac Electrophysiology  [address]  [address] [facility], [address] [place] [address]  [facility] [address]  [phone]

## 2022-02-28 ENCOUNTER — OFFICE VISIT (OUTPATIENT)
Dept: CARDIOLOGY CLINIC | Age: 62
End: 2022-02-28
Payer: COMMERCIAL

## 2022-02-28 VITALS
HEART RATE: 93 BPM | OXYGEN SATURATION: 100 % | WEIGHT: 154.6 LBS | BODY MASS INDEX: 27.39 KG/M2 | DIASTOLIC BLOOD PRESSURE: 66 MMHG | SYSTOLIC BLOOD PRESSURE: 116 MMHG | HEIGHT: 63 IN

## 2022-02-28 DIAGNOSIS — I48.19 PERSISTENT ATRIAL FIBRILLATION (HCC): Primary | ICD-10-CM

## 2022-02-28 PROCEDURE — G8482 FLU IMMUNIZE ORDER/ADMIN: HCPCS | Performed by: INTERNAL MEDICINE

## 2022-02-28 PROCEDURE — 1036F TOBACCO NON-USER: CPT | Performed by: INTERNAL MEDICINE

## 2022-02-28 PROCEDURE — 3017F COLORECTAL CA SCREEN DOC REV: CPT | Performed by: INTERNAL MEDICINE

## 2022-02-28 PROCEDURE — G8419 CALC BMI OUT NRM PARAM NOF/U: HCPCS | Performed by: INTERNAL MEDICINE

## 2022-02-28 PROCEDURE — G8427 DOCREV CUR MEDS BY ELIG CLIN: HCPCS | Performed by: INTERNAL MEDICINE

## 2022-02-28 PROCEDURE — 99215 OFFICE O/P EST HI 40 MIN: CPT | Performed by: INTERNAL MEDICINE

## 2022-02-28 PROCEDURE — 93000 ELECTROCARDIOGRAM COMPLETE: CPT | Performed by: INTERNAL MEDICINE

## 2022-02-28 NOTE — PATIENT INSTRUCTIONS
If you have any question regarding your ablation or would like to proceed with scheduling please contact Dr. Genaro Guy Nurse Cinthia at [phone]. Atrial Fibrillation Ablation Pre procedure Instructions    Date: 4-7-2022    Arrive at:  11:30 am    Procedure time: 1:00 pm    The morning of your procedure you will park in the hospital parking lot and report directly to the cath lab to check in. At the information desk stay right and go all the way to the end of the tao, this will take you directly to your check in desk for the cath lab. Pre-Procedure Instructions   1. You will need to fast (nothing to eat or drink) after midnight the day of your procedure  2. Do NOT chew gum or eat mints the day of your procedure. 3. You will need to hold your propafenone for 7 days prior to the procedure. 4. You will need to hold your Xarelto for 12 hours prior to the procedure. 5. You will need to hold all diabetic medications including, glyBURIDE-metFORMIN  the morning of the procedure. If you take Lantus/Levemir only take ½ your normal dose the evening before. 6. Do not use any lotions, creams or perfume the morning of procedure. 7. You will need to complete pre-procedure lab work 4-6 days prior to your procedure. 8. You will need to get a COVID test  4-6 days prior to your procedure, regardless of your vaccination status. You can get you COVID test at the [facility] lab (SEE BELOW). [facility] offer COVID testing at no cost. Please contact the location in which you would like to get the COVID testing completed to make sure it is one of the participating location. If you complete the testing at a location other than [facility] please bring results with you the day of your procedure. 9. Please have a responsible adult to drive you home after procedure, you should go home same day, but there is always a possibility of an overnight stay.   10. Cath lab will provide you with all post procedure instructions  11. A 3 month follow up will be scheduled with Dr. Bladimir Soni Nurse practitioner Magaly Wolfe CNP post procedure                                          [facility]  [address] [place] [address] Drive. [address], [place] [address]  Phone: [phone]  The hours are Mon -Fri. 6:30 am  4:00 pm   Saturday 8:00 am  noon  No appointment necessary    You may complete pre procedure labs & COVID test at this location. COVID TESTING CAN ONLY BE COMPLETED   Between hours of 12:00 pm & 4:00 pm  Monday through Friday  No Appointment necessary. There are parking spaces behind the [address] [place] [address] were you see Dr. Kemal Bernstein designated for COVID test where you can pull up and call number listed on the pole and they will come out and complete covid test while you are in your car.            -----------------------------------------------------------------------------------------------------------------------------------------------    If you have any question regarding your cardioversion or would like to proceed with scheduling please contact Dr. Bladimir Soni Nurse Manju Gilliland at [phone]. Cardioversion Pre Procedure Instructions     Date: 3-    Arrive at: 8:30 am    Procedure time: 9:30 am      The morning of your procedure you will park in the [facility] AND [facility] AT [facility] and report directly to the cath lab to check in. At the information desk stay right and go all the way to the end of the tao, this will take you directly to your check in desk for the cath lab. Pre-Procedure Instructions   1. Do not eat or drink anything after midnight the day of your procedure. 2. Take your Xarelto n the morning of the procedure with sips of water. 3. You will need to hold all diabetic medications including, glyBURIDE-metFORMIN  the morning of the procedure. If you take Lantus/Levemir only take ½ your normal dose the evening before.   All other medications can be taken in the morning with sips of water. 4. Do not use any lotions, creams or perfume the morning of procedure. 5. ou complete the testing at a location other than [facility] please bring results with you the day of your procedure. 6. Please have a responsible adult to drive you home after procedure. It is recommended you do not drive for 24 hours after procedure and that someone stay with you for precautionary measures. 7. Cath lab will provide you with all post procedure instructions. [address] [place] [address]/[facility] Lab services  [address] [place] [address]  Phone: [phone]  The hours are Mon -Fri. 6:30 am  4:00 pm   Saturday 8:00 am  noon  No appointment necessary    You may complete pre procedure labs & COVID test at this location. COVID TESTING CAN ONLY BE COMPLETED   Between hours of 12:00 pm & 4:00 pm  Monday through Friday  No Appointment necessary. There are parking spaces behind the [address] [place] [address] were you see Dr. Cassidy Menendez designated for COVID test where you can pull up and call number listed on the pole and they will come out and complete covid test while you are in your car. Patient Education        Learning About Atrial Fibrillation  What is atrial fibrillation? Atrial fibrillation (say \"AY-tree-eric rwx-fqut-ENY-shun\") is a common type of irregular heartbeat (arrhythmia). Normally, the heart beats in a strong, steady rhythm. In atrial fibrillation, a problem with the heart's electrical system causes the two upper chambers of the heart (called the atria) to quiver, or fibrillate. Atrial fibrillation can be dangerous. This is because if the heartbeat isn't strong and steady, blood can collect, or pool, in the atria. And pooled blood is more likely to form clots. Clots can travel to the brain, block blood flow, and cause a stroke.  Atrial fibrillation can also lead to heart failure. This condition also upsets the normal rhythm between the atria and the lower chambers of the heart. (These chambers are called the ventricles.) The ventricles may beat fast and without a regular rhythm. What are the symptoms? Some people feel symptoms when they have episodes of atrial fibrillation. But other people don't notice any symptoms. If you have symptoms, you may feel:  · A fluttering, racing, or pounding feeling in your chest called palpitations. · Weak or tired. · Dizzy or lightheaded. · Short of breath. · Chest pain. · Confused. You may notice signs of atrial fibrillation when you check your pulse. Your pulse may seem uneven or fast.  What can you expect when you have atrial fibrillation? At first, spells of atrial fibrillation may come on suddenly and last a short time. They may go away on their own or with treatment. Over time, the spells may last longer and occur more often. They often don't go away on their own. How is it treated? Treatments can help you feel better and prevent future problems, especially stroke and heart failure. Your treatment will depend on the cause of your atrial fibrillation, your symptoms, and your risk for stroke. Types of treatment include:  · Heart rate treatment. Medicine may be used to slow your heart rate. Your heartbeat may still be irregular. But these medicines keep your heart from beating too fast. They may also help relieve symptoms. · Heart rhythm treatment. Different treatments may be used to try to stop atrial fibrillation and keep it from returning. They can also relieve symptoms. These treatments include medicine, electrical cardioversion to shock the heart back to a normal rhythm, a procedure called catheter ablation, and heart surgery. · Stroke prevention. You and your doctor can decide how to lower your risk. You may decide to take a blood-thinning medicine called an anticoagulant.   What is a heart-healthy lifestyle for atrial fibrillation? You can live well and help manage atrial fibrillation by having a heart-healthy lifestyle. This lifestyle may help reduce how often you have episodes of atrial fibrillation. Don't smoke. Avoid secondhand smoke too. Quitting smoking is the best thing you can do for your heart. Be active. Talk to your doctor about what type and level of exercise is safe for you. Eat a heart-healthy diet. These foods include vegetables, fruits, nuts, beans, lean meat, fish, and whole grains. Limit sodium, alcohol, and sugar. Avoid alcohol if it triggers symptoms. Stay at a healthy weight. Lose weight if you need to. Losing weight can help relieve symptoms. Manage other health problems. These problems include diabetes, high blood pressure, and high cholesterol. If you think you may have a problem with alcohol or drug use, talk to your doctor. Manage stress. Options like yoga, biofeedback, and meditation may help. Where can you learn more? Go to https://InitMe.[employer]. org and sign in to your [email or web address] account. Enter W272 in the [employer] box to learn more about \"Learning About Atrial Fibrillation. \"     If you do not have an account, please click on the \"Sign Up Now\" link. Current as of: April 29, 2021               Content Version: 13.1  © 2006-2021 Healthwise, Incorporated. Care instructions adapted under license by [facility] ([facility]). If you have questions about a medical condition or this instruction, always ask your healthcare professional. [place] [address] any warranty or liability for your use of this information. Patient Education        Learning About Catheter Ablation for Heart Rhythm Problems  What is catheter ablation? Catheter ablation is a procedure that treats heart rhythm problems. These problems include atrial fibrillation, supraventricular tachycardia (SVT), atrial flutter, and ventricular tachycardia.   Your heart should have a strong, steady beat. That beat is controlled by the heart's electrical system. Sometimes that system misfires. This causes a heartbeat that is too fast and isn't steady. Catheter ablation is a way to get into your heart and fix the problem. Ablation is not surgery. How is catheter ablation done? Your doctor inserts thin tubes called catheters into a blood vessel in your groin, arm, or neck. Then your doctor feeds them into the heart. Wires in the catheters help the doctor find the problem areas. Then the doctor uses the wires to send energy to destroy the tiny areas of heart tissue that are causing the problems. It may seem like a bad idea to destroy parts of your heart on purpose. But the areas that are destroyed are very tiny. They should not affect your heart's ability to do its job. You may be awake during the procedure. Or you may be asleep. The doctor will give you medicines to help you feel relaxed and to numb the areas where the catheters go in. You may feel a little uncomfortable, but you should not feel pain. What can you expect after catheter ablation? You may stay overnight in the hospital. How long you stay in the hospital depends on the type of ablation you have. Do not exercise hard or lift anything heavy for a week. You will probably be able to go back to work and to your normal routine in 1 or 2 days. You may have swelling, bruising, or a small lump around the site where the catheters went into your body. These should go away in 3 to 4 weeks. You may have to take some medicines for a while. Follow-up care is a key part of your treatment and safety. Be sure to make and go to all appointments, and call your doctor if you are having problems. It's also a good idea to know your test results and keep a list of the medicines you take. Where can you learn more? Go to https://chjohn paul.[employer]. org and sign in to your [employer] account.  Enter J671 in the [facility] box to learn more about \"Learning About Catheter Ablation for Heart Rhythm Problems. \"     If you do not have an account, please click on the \"Sign Up Now\" link. Current as of: April 29, 2021               Content Version: 13.1  © 2006-2021 [employer]. Care instructions adapted under license by [facility] ([facility]). If you have questions about a medical condition or this instruction, always ask your healthcare professional. [place] [address] any warranty or liability for your use of this information. Patient Education        Electrophysiology Study and Catheter Ablation: Before Your Procedure  What is an electrophysiology study and catheter ablation? An electrophysiology study is a test to see if there is a problem with your heart rhythm and to find out how to fix it. It is also called an EP study. A catheter ablation procedure is sometimes done at the same time. This procedure destroys (ablates) small areas of your heart that are causing your heart rhythm problem. The doctor puts plastic tubes called catheters into blood vessels in your groin, arm, or neck. The doctor then uses an X-ray machine to guide long wires through the tubes to your heart. The doctor can use these wires to record your heart's electrical signals. If a problem with your heart can be fixed with ablation, the doctor can use the wires to destroy a small part of your heart tissue. This is most often done with radio waves. You may get medicine that relaxes you or puts you in a light sleep. Or you might be asleep during the procedure. The places where the catheters go in will be numb. An EP study and ablation can take 2 to 6 hours. In rare cases, it can take longer. If you have an EP study only and you don't need more treatment, you may go home the same day. But if you also have ablation, you may stay overnight in the hospital.  How do you prepare for the procedure? Procedures can be stressful.  This information will help you understand what you can expect. And it will help you safely prepare for your procedure. Preparing for the procedure    · Be sure you have someone to take you home. Anesthesia and pain medicine will make it unsafe for you to drive or get home on your own.     · Understand exactly what procedure is planned, along with the risks, benefits, and other options.     · Tell your doctor ALL the medicines, vitamins, supplements, and herbal remedies you take. Some may increase the risk of problems during your procedure. Your doctor will tell you if you should stop taking any of them before the procedure and how soon to do it.     · If you take aspirin or some other blood thinner, ask your doctor if you should stop taking it before your procedure. Make sure that you understand exactly what your doctor wants you to do. These medicines increase the risk of bleeding.     · Make sure your doctor and the hospital have a copy of your advance directive. If you don't have one, you may want to prepare one. It lets others know your health care wishes. It's a good thing to have before any type of surgery or procedure. What happens on the day of the procedure? · Follow the instructions exactly about when to stop eating and drinking. If you don't, your procedure may be canceled. If your doctor told you to take your medicines on the day of the procedure, take them with only a sip of water.     · Take a bath or shower before you come in for your procedure. Do not apply lotions, perfumes, deodorants, or nail polish.     · Take off all jewelry and piercings. And take out contact lenses, if you wear them. At the hospital or surgery center   · Bring a picture ID.     · You will be kept comfortable and safe by your anesthesia provider. You may get medicine that relaxes you or puts you in a light sleep. The area being worked on will be numb.  Or the anesthesia may make you sleep.     · This procedure can take 2 to 6 Electrophysiology Study and Catheter Ablation: What to Expect at [address] [place] [address] Drive had an electrophysiology study for a problem with your heartbeat. You may also have had a catheter ablation to try to correct the problem. You may have swelling, bruising, or a small lump around the sites where the catheters went into your body. These should go away in 3 to 4 weeks. You can do light activities at home. Don't do anything strenuous until your doctor says it is okay. This may be for several days. This care sheet gives you a general idea about how long it will take for you to recover. But each person recovers at a different pace. Follow the steps below to get better as quickly as possible. How can you care for yourself at home? Activity    · If the doctor gave you a sedative:  ? For 24 hours, don't do anything that requires attention to detail, such as going to work, making important decisions, or signing any legal documents. It takes time for the medicine's effects to completely wear off.  ? For your safety, do not drive or operate any machinery that could be dangerous. Wait until the medicine wears off and you can think clearly and react easily.     · Do not do strenuous exercise and do not lift, pull, or push anything heavy until your doctor says it is okay. This may be for several days.     · Ask your doctor when it is okay to have sex. Diet    · You can eat your normal diet. If your stomach is upset, try bland, low-fat foods like plain rice, broiled chicken, toast, and yogurt.     · Drink plenty of fluids (unless your doctor tells you not to). Medicines    · Your doctor will tell you if and when you can restart your medicines. You will also get instructions about taking any new medicines.     · If you take aspirin or some other blood thinner, ask your doctor if and when to start taking it again. Make sure that you understand exactly what your doctor wants you to do.     · Be safe with medicines. Take your medicines exactly as prescribed. Call your doctor if you think you are having a problem with your medicine. Catheter site care    · For 1 or 2 days, keep a bandage over the spot where the catheter was inserted. The bandage probably will fall off in this time.     · Put ice or a cold pack on the area for 10 to 20 minutes at a time to help with soreness or swelling. Put a thin cloth between the ice and your skin.     · You may shower 24 to 48 hours after the procedure, if your doctor okays it. Pat the incision dry.     · Do not soak the catheter site until it is healed. Don't take a bath for 1 week, or until your doctor tells you it is okay.     · Watch for bleeding from the site. A small amount of blood (up to the size of a quarter) on the bandage can be normal.     · If you are bleeding, lie down and press on the area for 15 minutes to try to make it stop. If the bleeding does not stop, call your doctor or seek immediate medical care. Follow-up care is a key part of your treatment and safety. Be sure to make and go to all appointments, and call your doctor if you are having problems. It's also a good idea to know your test results and keep a list of the medicines you take. When should you call for help? Call 911  anytime you think you may need emergency care. For example, call if:    · You passed out (lost consciousness).     · You have symptoms of a heart attack. These may include:  ? Chest pain or pressure, or a strange feeling in the chest.  ? Sweating. ? Shortness of breath. ? Nausea or vomiting. ? Pain, pressure, or a strange feeling in the back, neck, jaw, or upper belly, or in one or both shoulders or arms. ? Lightheadedness or sudden weakness. ? A fast or irregular heartbeat. After you call 911, the  may tell you to chew 1 adult-strength or 2 to 4 low-dose aspirin. Wait for an ambulance. Do not try to drive yourself.     · You have symptoms of a stroke.  These may include:  ? Sudden numbness, tingling, weakness, or loss of movement in your face, arm, or leg, especially on only one side of your body. ? Sudden vision changes. ? Sudden trouble speaking. ? Sudden confusion or trouble understanding simple statements. ? Sudden problems with walking or balance. ? A sudden, severe headache that is different from past headaches. Call your doctor now or seek immediate medical care if:    · You are bleeding from the area where the catheter was put in your blood vessel.     · You have a fast-growing, painful lump at the catheter site.     · You have signs of infection, such as:  ? Increased pain, swelling, warmth, or redness. ? Red streaks leading from the catheter site. ? Pus draining from the catheter site. ? A fever.     · Your leg, arm, or hand is painful, looks blue, or feels cold, numb, or tingly. Watch closely for any changes in your health, and be sure to contact your doctor if you have any problems. Where can you learn more? Go to https://"[employer].O[employer]ight Systems. org and sign in to your [employer] account. Enter [phone] in the [facility] box to learn more about \"Electrophysiology Study and Catheter Ablation: What to Expect at Home. \"     If you do not have an account, please click on the \"Sign Up Now\" link. Current as of: April 29, 2021               Content Version: 13.1  © 7467-2428 Healthwise, Incorporated. Care instructions adapted under license by [facility] ([facility]). If you have questions about a medical condition or this instruction, always ask your healthcare professional. [place] [address] any warranty or liability for your use of this information.

## 2022-03-07 DIAGNOSIS — I48.19 PERSISTENT ATRIAL FIBRILLATION (HCC): ICD-10-CM

## 2022-03-07 LAB
ABO/RH: NORMAL
ANION GAP SERPL CALCULATED.3IONS-SCNC: 15 MMOL/L (ref 3–16)
ANTIBODY SCREEN: NORMAL
BUN BLDV-MCNC: 22 MG/DL (ref 7–20)
CALCIUM SERPL-MCNC: 10 MG/DL (ref 8.3–10.6)
CHLORIDE BLD-SCNC: 103 MMOL/L (ref 99–110)
CO2: 22 MMOL/L (ref 21–32)
CREAT SERPL-MCNC: 0.9 MG/DL (ref 0.6–1.2)
GFR AFRICAN AMERICAN: >60
GFR NON-AFRICAN AMERICAN: >60
GLUCOSE BLD-MCNC: 90 MG/DL (ref 70–99)
HCT VFR BLD CALC: 36.3 % (ref 36–48)
HEMOGLOBIN: 12.4 G/DL (ref 12–16)
MCH RBC QN AUTO: 33.1 PG (ref 26–34)
MCHC RBC AUTO-ENTMCNC: 34.1 G/DL (ref 31–36)
MCV RBC AUTO: 97.1 FL (ref 80–100)
PDW BLD-RTO: 12.9 % (ref 12.4–15.4)
PLATELET # BLD: 271 K/UL (ref 135–450)
PMV BLD AUTO: 9.1 FL (ref 5–10.5)
POTASSIUM SERPL-SCNC: 4.7 MMOL/L (ref 3.5–5.1)
RBC # BLD: 3.74 M/UL (ref 4–5.2)
SODIUM BLD-SCNC: 140 MMOL/L (ref 136–145)
WBC # BLD: 7.4 K/UL (ref 4–11)

## 2022-03-08 LAB — SARS-COV-2: NOT DETECTED

## 2022-03-11 ENCOUNTER — HOSPITAL ENCOUNTER (OUTPATIENT)
Dept: CARDIAC CATH/INVASIVE PROCEDURES | Age: 62
Discharge: HOME | End: 2022-03-11
Payer: COMMERCIAL

## 2022-03-11 LAB
EKG ATRIAL RATE: 64 BPM
EKG DIAGNOSIS: NORMAL
EKG P AXIS: -4 DEGREES
EKG P-R INTERVAL: 184 MS
EKG Q-T INTERVAL: 442 MS
EKG QRS DURATION: 86 MS
EKG QTC CALCULATION (BAZETT): 455 MS
EKG R AXIS: -1 DEGREES
EKG T AXIS: 31 DEGREES
EKG VENTRICULAR RATE: 64 BPM

## 2022-03-11 PROCEDURE — 92960 CARDIOVERSION ELECTRIC EXT: CPT | Performed by: INTERNAL MEDICINE

## 2022-03-11 PROCEDURE — 2580000003 HC RX 258

## 2022-03-11 PROCEDURE — 93005 ELECTROCARDIOGRAM TRACING: CPT | Performed by: INTERNAL MEDICINE

## 2022-03-11 PROCEDURE — 93010 ELECTROCARDIOGRAM REPORT: CPT | Performed by: INTERNAL MEDICINE

## 2022-03-11 PROCEDURE — 92960 CARDIOVERSION ELECTRIC EXT: CPT

## 2022-03-11 PROCEDURE — 99152 MOD SED SAME PHYS/QHP 5/>YRS: CPT | Performed by: INTERNAL MEDICINE

## 2022-03-11 PROCEDURE — 2500000003 HC RX 250 WO HCPCS

## 2022-03-11 RX ORDER — SODIUM CHLORIDE 9 MG/ML
INJECTION, SOLUTION INTRAVENOUS CONTINUOUS
Status: DISCONTINUED | OUTPATIENT
Start: 2022-03-11 | End: 2022-03-12 | Stop reason: HOSPADM

## 2022-03-11 RX ORDER — SODIUM CHLORIDE 0.9 % (FLUSH) 0.9 %
5-40 SYRINGE (ML) INJECTION EVERY 12 HOURS SCHEDULED
Status: DISCONTINUED | OUTPATIENT
Start: 2022-03-11 | End: 2022-03-11 | Stop reason: SDUPTHER

## 2022-03-11 RX ORDER — SODIUM CHLORIDE 0.9 % (FLUSH) 0.9 %
5-40 SYRINGE (ML) INJECTION EVERY 12 HOURS SCHEDULED
Status: DISCONTINUED | OUTPATIENT
Start: 2022-03-11 | End: 2022-03-12 | Stop reason: HOSPADM

## 2022-03-11 RX ORDER — SODIUM CHLORIDE 9 MG/ML
25 INJECTION, SOLUTION INTRAVENOUS PRN
Status: DISCONTINUED | OUTPATIENT
Start: 2022-03-11 | End: 2022-03-12 | Stop reason: HOSPADM

## 2022-03-11 RX ORDER — SODIUM CHLORIDE 0.9 % (FLUSH) 0.9 %
5-40 SYRINGE (ML) INJECTION PRN
Status: DISCONTINUED | OUTPATIENT
Start: 2022-03-11 | End: 2022-03-12 | Stop reason: HOSPADM

## 2022-03-11 RX ORDER — SODIUM CHLORIDE 9 MG/ML
25 INJECTION, SOLUTION INTRAVENOUS PRN
Status: DISCONTINUED | OUTPATIENT
Start: 2022-03-11 | End: 2022-03-11 | Stop reason: SDUPTHER

## 2022-03-11 RX ORDER — SODIUM CHLORIDE 0.9 % (FLUSH) 0.9 %
5-40 SYRINGE (ML) INJECTION PRN
Status: DISCONTINUED | OUTPATIENT
Start: 2022-03-11 | End: 2022-03-11 | Stop reason: SDUPTHER

## 2022-03-11 NOTE — H&P
Cardiac Electrophysiology Consultation   Date: 3/11/2022  Reason for Consultation: Persistent atrial fibrillation  Consult Requesting Physician:  Wyatt Morataya MD  Primary Care Physician: Abdon Miller MD     Chief Complaint:        Chief Complaint   Patient presents with    Follow-up       no cc       HPI: Nelson Mckeon is a 64 y.o. patient with a history of atrial fibrillation, type II diabetes mellitus, mixed hyperlipidemia and essential hypertension. She was seen in office on 7/13/2020 and afib ablation was discussed as a treatment options she did not decide to proceed with the ablation at that time. She stated that she was asymptomatic with her atrial fibrillation and was unaware she had it before she was diagnosed.      She was seen in office on 8/23/2021 to establish care and discuss treatment options for her atrial fibrillation. She did not endorse any identifiable exacerbating or alleviating factors for the atrial fibrillation. On 10/21/2021 she underwent atrial fibrillation (PVI, CAFE), left atrial flutter (roofl line, mitral isthmus) and right atrial flutter (CTI) ablation. She was started on flecainide 50mg BID and metoprolol was decreased to 25mg BID. Flecainide was changed to Rythmol due to side effects  She was seen in office on 1/24/2022 by Jono Trevizo CNP for her 3 month post ablation follow up and EKG showed atrial fibrillation rate controlled at 96 bpm.     On 1/28/2022 she underwent successful external DC cardioversion of symptomatic, persistent left atrial flutter. Interval History: Today, she presents to office for management of her atrial fibrillation. EKG today shows atrial fibrillation rate controlled at 91 bpm. She states she was first told she had atrial fibrillation 10-15 years ago.  She denies having any symptoms today but states she did have symptoms when she was in atrial fibrillation in the past. She states that she had difficulty sleeping when she was in atrial fibrillation in the past.      Past Medical History        Past Medical History:   Diagnosis Date    Atrial fibrillation, controlled ([facility] [address] 3/27/2013    Essential hypertension 11/1/2011    Melanoma ([facility] [address]      Mixed hyperlipidemia 11/1/2011    Psoriasis      Type II or unspecified type diabetes mellitus without mention of complication, not stated as uncontrolled              Past Surgical History         Past Surgical History:   Procedure Laterality Date    BREAST REDUCTION SURGERY   2017    COLONOSCOPY N/A 10/23/2020     COLONOSCOPY WITH BIOPSY performed by Julio Rosales MD at [facility] Right 12/28/2020    LUMBAR [address]        ROTATOR CUFF REPAIR Right December 2014     Dr. Onur Morgan at [facility]            Allergies: Allergies   Allergen Reactions    Codeine           Medication:   Home Medications           Prior to Admission medications    Medication Sig Start Date End Date Taking?  Authorizing Provider   propafenone (RYTHMOL) 150 MG tablet Take 1 tablet by mouth every 8 hours 1/24/22   Yes JAZMYN James - CNP   atorvastatin (LIPITOR) 40 MG tablet Take 1 tablet by mouth daily 11/29/21   Yes Estefany Saldana MD   glyBURIDE-metFORMIN (GLUCOVANCE) 5-500 MG per tablet Take 1 tablet by mouth twice a day 11/29/21   Yes Estefany Saldana MD   metoprolol tartrate (LOPRESSOR) 25 MG tablet Take 1 tablet by mouth 2 times daily 11/29/21   Yes Estefany Saldana MD   rivaroxaban (XARELTO) 20 MG TABS tablet TAKE ONE TABLET BY MOUTH DAILY 11/29/21   Yes Estefany Saldana MD   lidocaine (XYLOCAINE) 5 % ointment   10/26/20   Yes Historical Provider, MD   valACYclovir (VALTREX) 500 MG tablet   10/26/20   Yes Historical Provider, MD   dicyclomine (BENTYL) 20 MG tablet Take 20 mg by mouth 4 times daily as needed     Yes Historical Provider, MD   Apremilast 30 MG TABS Take 30 mg by mouth daily  4/19/18   Yes Historical Provider, MD valsartan-hydroCHLOROthiazide (DIOVAN-HCT) 160-12.5 MG per tablet Take 1 tablet by mouth daily 11/29/21 2/27/22   Ilan Ng MD            Social History:   reports that she has never smoked. She has never used smokeless tobacco. She reports current alcohol use. She reports that she does not use drugs.         Family History:  family history includes Atrial Fibrillation in her father; Cancer in her sister; Heart Disease in her father and mother; Other in her mother. Reviewed. Denies family history of sudden cardiac death, arrhythmia, premature CAD     Review of System:     · General ROS: negative for - chills, fever   · Psychological ROS: negative for - anxiety or depression  · Ophthalmic ROS: negative for - eye pain or loss of vision  · ENT ROS: negative for - epistaxis, headaches, nasal discharge, sore throat   · Allergy and Immunology ROS: negative for - hives, nasal congestion   · Hematological and Lymphatic ROS: negative for - bleeding problems, blood clots, bruising or jaundice  · Endocrine ROS: negative for - skin changes, temperature intolerance or unexpected weight changes  · Respiratory ROS: negative for - cough, hemoptysis, pleuritic pain, SOB, sputum changes or wheezing  · Cardiovascular ROS: Per HPI. · Gastrointestinal ROS: negative for - abdominal pain, blood in stools, diarrhea, hematemesis, melena, nausea/vomiting or swallowing difficulty/pain  · Genito-Urinary ROS: negative for - dysuria or incontinence  · Musculoskeletal ROS: negative for - joint swelling or muscle pain  · Neurological ROS: negative for - confusion, dizziness, gait disturbance, headaches, numbness/tingling, seizures, speech problems, tremors, visual changes or weakness  · Dermatological ROS: negative for - rash     Physical Examination:      Vitals:     02/28/22 1405   BP: 116/66   Pulse: 93   SpO2: 100%         · Constitutional: Oriented. No distress. · Head: Normocephalic and atraumatic.    · Mouth/Throat: Oropharynx is clear and moist.   · Eyes: Conjunctivae normal. EOM are normal.   · Neck: Normal range of motion. Neck supple. No rigidity. No JVD present. · Cardiovascular: Normal rate, irregular rhythm, S1&S2 and intact distal pulses. · Pulmonary/Chest: Bilateral respiratory sounds. No wheezes. No rhonchi. · Abdominal: Soft. Bowel sounds present. No distension, No tenderness. · Musculoskeletal: No tenderness. No edema    · Lymphadenopathy: Has no cervical adenopathy. · Neurological: Alert and oriented. Cranial nerve appears intact, No Gross deficit   · Skin: Skin is warm and dry. No rash noted. · Psychiatric: Has a normal mood, affect and behavior      Labs:  Reviewed.      ECG: Atrial fibrillation with v-rate of 91 bpm with QRS duration 94 ms. No pathologic Q waves, ventricular pre-excitation, or QT prolongation.      Studies:   1. Event monitor:   none     2. Echo: 3/27/2013  LVEF 50-55 %  The left ventricular wall motion is normal.  The left atrium is mildly dilated. Mild mitral regurgitation. Mild aortic regurgitation. No pulmonic valve regurgitation.     3. Stress Test:  6/14/2021  Summary    Normal myocardial perfusion study.    Normal LV size and systolic function.    Patient in atrial fibrillation.    Overall findings represent a low risk study.      4. Cath: n/a     I independently reviewed and interpreted the ECG, MCOT, echocardiogram, stress test, and coronary angiography/PCI results and used them for my plan of care. Procedures:  1. Electrophysiology study with radiofrequency ablation of atrial fibrillation & left atrial flutter and pulmonary vein isolation 10/21/2022.  2. Successful external DC cardioversion of symptomatic, persistent left atrial flutter.  1/28/2022.     Assessment/Plan:      Persistent Atrial fibrillation and Atrial flutter   -EKG today shows atrial fibrillation with RVR, v-rate 109 bpm.  -First seen on EKG 3/25/2013 and every EKG in Epic shows afib /aflutter has persisted since that time.  -She has a [id number] Score 3 (HTN, DM, GENDER)  -On Eliquis 5 mg BID for  thromboembolic risk reduction.  -Tolerating well no signs or symptoms of abnormal bruising or bleeding.  -She was placed on Flecainide s/p ablation but did tolerate the medication so it was changed to propafenone 150 mg Q8H.    - Treatment options for atrial fibrillation including cardioversion, anti-arrhythmic medication therapy, rate control strategy, oral anticoagulation, and atrial fibrillation ablation were discussed with patient. Risks, benefits and alternative of each treatment options (care, treatment, and services) were explained. The patient was also presented reasonable alternatives to the proposed care, treatment, and services. The discussion I have had with the patient encompassed risks, benefits, and side effects related to the alternatives and the risks related to not receiving the proposed care, treatment and services. All questions were answered.      -I recommended that she undergo CV to restore NSR while awaiting for the ablation. Patient agreeable.      We educated the patient that left atrial flutter/ atrial fibrillation is a worsening and progressive disease, with more frequent episodes that will ensue. Subsequent episodes usually become more sustained to the extent that many individuals would then develop persistent left atrial flutter. Once persistence is reached, permanent left atrial flutter /atrial fibrillation is inevitable. We also discussed the fact that left atrial flutter is associated with stroke, including life-threatening stroke, and therefore oral anticoagulation is warranted depending on the patient's [id number] score.     We discussed different management options for left atrial flutter/ atrial fibrillation including their risks and benefits.  These options include use of cardioversion which provides an effective immediate therapy with success rates of 90% or higher, but it provides no short nor long term efficacy. Anti-arrhythmic medications has been very difficult to control left atrial flutter, both in regards to heart rate and rhythm control even with a powerful anti-arrhythmic medication (amiodarone). Left atrial flutter ablation is a potentially curative therapy with very reasonable success rate.      The risks, benefits and alternatives of the left atrial flutter/ atrial fibrillation ablation procedure were discussed with the patient. The risks including, but not limited to, bleeding, infection, radiation exposure, injury to vascular, cardiac and surrounding structures (including pneumothorax), stroke, cardiac perforation, tamponade, need for emergent open heart surgery, need for pacemaker implantation, injury to the phrenic nerve, injury to the esophagus, myocardial infarction and death were discussed in detail. The patient was also counseled at length about the risks of melba Covid-19 in the lico-operative and post-operative states including the recovery window of their procedure. The patient was made aware that melba Covid-19 after a surgical procedure may worsen their prognosis for recovering from the virus and lend to a higher morbidity and or mortality risk. The patient was given the option of postponing their procedure. The patient was also presented reasonable alternatives to the proposed care, treatment, and services. The discussion I have had with the patient encompassed risks, benefits, and side effects related to the alternatives and the risks related to not receiving the proposed care, treatment and services.      I spent 40 minutes face to face with the patient, with greater than 50% of that time spent in counseling on the above.     The patient opted to proceed with the left atrial flutter /atrial fibrillation ablation.  We will schedule for a radiofrequency ablation with [employer] system with a JOAO procedure immediately prior to this ablation. We will hold Xarelto for 12 hours prior to procedure. We will order BMP, CBC, PT/INR, and Type & Screen prior to the procedure.         Follow up three months after procedure with Ya Rivera CNP.     Thank you for allowing me to participate in the care of Carmin Gilford. All questions and concerns were addressed to the patient/family. Alternatives to my treatment were discussed.      I have reviewed the history and physical and examined the patient and find no relevant changes. I have reviewed with the patient and/or family the risks and benefits to the proposed procedure. The patient was presented with the option of postponing the proposed procedure. The patient was also presented reasonable alternatives to the proposed care, treatment, and services.  The discussion I have had with the patient encompassed risks, benefits, and side effects related to the alternatives and the risks related to not receiving the proposed care, treatment and services.      Anya Tamez MD, [facility] [address], [place] [address] Electrophysiology  [address], [address] [place] [address]  [place] [place] [facility] [address]  [phone]

## 2022-03-11 NOTE — PROCEDURES
[facility]     Electrophysiology Procedure Note       Date of Procedure: 3/11/2022  Patient's Name: Dawson Sommers  YOB: 1960   Medical Record Number: [id number]  Referring Physician: Britni att. providers found  Procedure Performed by: Genoveva Castle MD    Procedures performed:  · Anesthesia: Monitored Anesthesia Care  · Level of sedation plan: Moderate sedation (conscious sedation) with intravenous Methohexital 50 mg  · Sedation start time: 0858  · Sedation stop time: 0900  · Mallampati airway assessment class: I  · ASA class: 1  · External Electrical cardioversion     Indication of the procedure: Symptomatic, persistent atrial flutter      Details of procedure: The patient was brought to the cath lab area in a fasting and non-sedated state. The risks, benefits and alternatives of the procedure were discussed with the patient. The risks including, but not limited to, the risks of vascular injury, bleeding, infection, any pre-existing cardiac implantable electronic device malfunction, any pre-existing cardiac implantable electronic device's lead dislodgement, injury to cardiac and surrounding structures (including pneumothorax), stroke, myocardial infarction and death were discussed in detail. The patient was also counseled at length about the risks of melba Covid-19 in the lico-operative and post-operative states including the recovery window of their procedure. The patient was made aware that melba Covid-19 after a surgical procedure may worsen their prognosis for recovering from the virus and lend to a higher morbidity and or mortality risk. The patient was given the option of postponing their procedure. The patient was also presented reasonable alternatives to the proposed care, treatment, and services.  The discussion I have had with the patient encompassed risks, benefits, and side effects related to the alternatives and the risks related to not receiving the proposed care, treatment and services. The patient opted to proceed with the procedure. Written informed consent was signed and placed in the chart. A timeout protocol was completed to identify the patient and the procedure being performed. An independent trained observer assumed the sole responsibility of administering IV sedation medication - Versed, Fentanyl - at my direction and closely monitored the patient. Patient is on chronic anticoagulation therapy with Xarelto uninterrupted for at least the last 3 weeks if not longer. The patient was monitored continuously with ECG, pulse oximetry, blood pressure monitoring, and direct observation. We then administered intravenous Methohexital for sedation, and electrical DC cardioversion was performed using 200J, synchronized shock. Patient was converted to sinus rhythm immediately. Specimen collected: none       The patient tolerated the procedure well and there were no complications. Conclusion:   Successful external DC cardioversion of symptomatic, persistent atrial flutter. Plan:   The patient can be discharged if remains stable. Will continue with pre-admission medications Rythmol 150mg po q8hrs, metoprolol 25mg po BID, and Xarelto 20mg daily. The patient will follow-up with CORTEZ Low NP, within 1-2 weeks to confirm continued sinus rhythm. The patient is already scheduled for an ablation for left atrial flutter. Thank you for allowing me to participate in the care of this patient. If you have any questions, please feel free to contact me.     Bobie Closs, MD, MS, [facility]  Cardiac Electrophysiology  [address]  [address] [facility], [address] [place] [address]  [facility] [address]  [phone]

## 2022-03-11 NOTE — PRE SEDATION
Sedation Pre-Procedure Note    Patient Name: Flor Huffman   YOB: 1960  Room/Bed: Room/bed info not found  Medical Record Number: [id number]  Date: 3/11/2022   Time: 8:51 AM       Indication:  Persistent left atrial flutter    Consent: I have discussed with the patient and/or the patient representative the indication, alternatives, and the possible risks and/or complications of the planned procedure and the anesthesia methods. The patient and/or patient representative appear to understand and agree to proceed. Vital Signs: There were no vitals filed for this visit. Past Medical History:   has a past medical history of Atrial fibrillation, controlled ([facility] [address] Essential hypertension, Melanoma ([facility] [address] Mixed hyperlipidemia, Psoriasis, and Type II or unspecified type diabetes mellitus without mention of complication, not stated as uncontrolled. Past Surgical History:   has a past surgical history that includes Rotator cuff repair (Right, December 2014); Lumbar disc surgery; Breast reduction surgery (2017); joint replacement (Right, 12/28/2020); and Colonoscopy (N/A, 10/23/2020). Medications:   Scheduled Meds:   Continuous Infusions:   PRN Meds:   Home Meds:   Prior to Admission medications    Medication Sig Start Date End Date Taking?  Authorizing Provider   propafenone (RYTHMOL) 150 MG tablet Take 1 tablet by mouth every 8 hours 1/24/22   JAZMYN Carlisle - CNP   atorvastatin (LIPITOR) 40 MG tablet Take 1 tablet by mouth daily 11/29/21   [facility] Marce, MD   glyBURIDE-metFORMIN (GLUCOVANCE) 5-500 MG per tablet Take 1 tablet by mouth twice a day 11/29/21   [facility] Marce, MD   metoprolol tartrate (LOPRESSOR) 25 MG tablet Take 1 tablet by mouth 2 times daily 11/29/21   [facility] Marce, MD   rivaroxaban (XARELTO) 20 MG TABS tablet TAKE ONE TABLET BY MOUTH DAILY 11/29/21   [facility] Marce, MD   valsartan-hydroCHLOROthiazide (DIOVAN-HCT) 160-12.5 MG per tablet Take 1 tablet by mouth daily 11/29/21 2/27/22  Tanna Tapia MD   lidocaine (XYLOCAINE) 5 % ointment  10/26/20   Historical Provider, MD   valACYclovir (VALTREX) 500 MG tablet  10/26/20   Historical Provider, MD   dicyclomine (BENTYL) 20 MG tablet Take 20 mg by mouth 4 times daily as needed    Historical Provider, MD   Apremilast 30 MG TABS Take 30 mg by mouth daily  4/19/18   Historical Provider, MD     Coumadin Use Last 7 Days:  no  Antiplatelet drug therapy use last 7 days: no  Other anticoagulant use last 7 days: yes - Xarelto  Additional Medication Information:  n/a      Pre-Sedation Documentation and Exam:   I have personally completed a history, physical exam & review of systems for this patient (see notes).     Mallampati Airway Assessment:  Mallampati Class I - (soft palate, fauces, uvula & anterior/posterior tonsillar pillars are visible)    Prior History of Anesthesia Complications:   none    ASA Classification:  Class 2 - A normal healthy patient with mild systemic disease    Sedation/ Anesthesia Plan:   intravenous sedation    Medications Planned:   midazolam (Versed) intravenously, fentanyl intravenously and Methohexital    Patient is an appropriate candidate for plan of sedation: yes    Electronically signed by Emily Carrasquillo MD on 3/11/2022 at 8:51 AM

## 2022-03-24 ENCOUNTER — NURSE ONLY (OUTPATIENT)
Dept: CARDIOLOGY CLINIC | Age: 62
End: 2022-03-24
Payer: COMMERCIAL

## 2022-03-24 ENCOUNTER — TELEPHONE (OUTPATIENT)
Dept: CARDIOLOGY CLINIC | Age: 62
End: 2022-03-24

## 2022-03-24 VITALS — HEIGHT: 63 IN | BODY MASS INDEX: 27.39 KG/M2 | DIASTOLIC BLOOD PRESSURE: 64 MMHG | SYSTOLIC BLOOD PRESSURE: 122 MMHG

## 2022-03-24 DIAGNOSIS — I48.0 PAF (PAROXYSMAL ATRIAL FIBRILLATION) (HCC): Primary | ICD-10-CM

## 2022-03-24 PROCEDURE — 93000 ELECTROCARDIOGRAM COMPLETE: CPT | Performed by: INTERNAL MEDICINE

## 2022-03-24 NOTE — PROGRESS NOTES
Patient into office for EKG. EKG appears to be NSR. Sent to Dr. Tracie Estes for review. She has an area on her left breast where CV pad was place which is light red in color and is causing discomfort. I advised the the pain should lessen as she heals but that she may notice that it changes colors as it heals. Advised to call the office if pain worsens and that she can take tylenol PRN for pain. Verbalized understanding.

## 2022-03-24 NOTE — TELEPHONE ENCOUNTER
Called Brenda this morning to cancel her appointment this afternoon with Christianne Lewis NP due to her being sick and Leonie Ventura is wanting to know if she can see another doctor today. She states that ever since her Cardioversion she has had a bruise on her chest, she said it hurts to touch it and it's painful.        You can reach Leonie Ventura at #[phone]

## 2022-04-04 ENCOUNTER — TELEPHONE (OUTPATIENT)
Dept: CARDIOLOGY CLINIC | Age: 62
End: 2022-04-04

## 2022-04-04 DIAGNOSIS — I48.0 PAF (PAROXYSMAL ATRIAL FIBRILLATION) (HCC): Primary | ICD-10-CM

## 2022-04-04 NOTE — TELEPHONE ENCOUNTER
Called and confirmed that patient does want to cancel procedure. She says that she does not feel like her body is physically ready for another procedure at this time. She would like to schedule sometime in May if possible. Updated Susie Davis and e-mail to Solomon Pimentel.

## 2022-04-04 NOTE — TELEPHONE ENCOUNTER
Pt is rescheduled to 5/19/22 with an arrival time of 11:30. Went over pre-procedure instructions together. Added to [place] and form was faxed to Cath Lab. Pt will need lab orders place into chart.

## 2022-04-04 NOTE — TELEPHONE ENCOUNTER
Benita Garcia called in stating that she wants to cancel her JOAO on 4/7/22 and she wants to talk to [facility], about rescheduling the JOAO.        Benita Garcia can be reached at [phone]

## 2022-04-28 ENCOUNTER — HOSPITAL ENCOUNTER (OUTPATIENT)
Dept: GENERAL RADIOLOGY | Age: 62
Discharge: HOME | End: 2022-04-28
Payer: COMMERCIAL

## 2022-04-28 ENCOUNTER — HOSPITAL ENCOUNTER (OUTPATIENT)
Age: 62
Discharge: HOME | End: 2022-04-28
Payer: COMMERCIAL

## 2022-04-28 ENCOUNTER — OFFICE VISIT (OUTPATIENT)
Dept: INTERNAL MEDICINE CLINIC | Age: 62
End: 2022-04-28
Payer: COMMERCIAL

## 2022-04-28 VITALS
HEART RATE: 98 BPM | TEMPERATURE: 97.3 F | SYSTOLIC BLOOD PRESSURE: 102 MMHG | RESPIRATION RATE: 16 BRPM | BODY MASS INDEX: 27.99 KG/M2 | DIASTOLIC BLOOD PRESSURE: 64 MMHG | WEIGHT: 158 LBS | OXYGEN SATURATION: 99 %

## 2022-04-28 DIAGNOSIS — R07.82 INTERCOSTAL PAIN: Primary | ICD-10-CM

## 2022-04-28 DIAGNOSIS — R07.82 INTERCOSTAL PAIN: ICD-10-CM

## 2022-04-28 PROCEDURE — G8419 CALC BMI OUT NRM PARAM NOF/U: HCPCS | Performed by: INTERNAL MEDICINE

## 2022-04-28 PROCEDURE — 3017F COLORECTAL CA SCREEN DOC REV: CPT | Performed by: INTERNAL MEDICINE

## 2022-04-28 PROCEDURE — G8427 DOCREV CUR MEDS BY ELIG CLIN: HCPCS | Performed by: INTERNAL MEDICINE

## 2022-04-28 PROCEDURE — 1036F TOBACCO NON-USER: CPT | Performed by: INTERNAL MEDICINE

## 2022-04-28 PROCEDURE — 71046 X-RAY EXAM CHEST 2 VIEWS: CPT

## 2022-04-28 PROCEDURE — 99214 OFFICE O/P EST MOD 30 MIN: CPT | Performed by: INTERNAL MEDICINE

## 2022-04-28 RX ORDER — LIDOCAINE 50 MG/G
1 PATCH TOPICAL DAILY
Qty: 10 PATCH | Refills: 0 | Status: SHIPPED | OUTPATIENT
Start: 2022-04-28 | End: 2022-04-29

## 2022-04-28 SDOH — ECONOMIC STABILITY: FOOD INSECURITY: WITHIN THE PAST 12 MONTHS, YOU WORRIED THAT YOUR FOOD WOULD RUN OUT BEFORE YOU GOT MONEY TO BUY MORE.: NEVER TRUE

## 2022-04-28 SDOH — ECONOMIC STABILITY: FOOD INSECURITY: WITHIN THE PAST 12 MONTHS, THE FOOD YOU BOUGHT JUST DIDN'T LAST AND YOU DIDN'T HAVE MONEY TO GET MORE.: NEVER TRUE

## 2022-04-28 ASSESSMENT — ENCOUNTER SYMPTOMS
NAUSEA: 0
ABDOMINAL PAIN: 0
VOMITING: 0
BLOOD IN STOOL: 0
SHORTNESS OF BREATH: 0
COUGH: 0
SORE THROAT: 0
BOWEL INCONTINENCE: 0

## 2022-04-28 ASSESSMENT — SOCIAL DETERMINANTS OF HEALTH (SDOH): HOW HARD IS IT FOR YOU TO PAY FOR THE VERY BASICS LIKE FOOD, HOUSING, MEDICAL CARE, AND HEATING?: NOT HARD AT ALL

## 2022-04-28 ASSESSMENT — PATIENT HEALTH QUESTIONNAIRE - PHQ9
SUM OF ALL RESPONSES TO PHQ QUESTIONS 1-9: 0
2. FEELING DOWN, DEPRESSED OR HOPELESS: 0
SUM OF ALL RESPONSES TO PHQ9 QUESTIONS 1 & 2: 0
SUM OF ALL RESPONSES TO PHQ QUESTIONS 1-9: 0
1. LITTLE INTEREST OR PLEASURE IN DOING THINGS: 0

## 2022-04-28 NOTE — PROGRESS NOTES
Bijan Anderson (:  1960) is a 64 y.o. female, Established patient, here for evaluation of the following chief complaint(s):  Fall (monday while in [facility] fell on cement hit right side of chest possibly purse hit chest)         ASSESSMENT/PLAN:  1. Intercostal pain  Started after trauma from fall  Uncontrolled  -     XR CHEST (2 VW); Future  -     lidocaine (LIDODERM) 5 %; Place 1 patch onto the skin daily for 10 days 12 hours on, 12 hours off., Disp-10 patch, R-0 Normal  Advised to take deep breaths frequently to avoid hypoventilation. Return in about 2 months (around 2022). Subjective   SUBJECTIVE/OBJECTIVE:  Fall  The accident occurred 2 days ago. The fall occurred while walking. She fell from an unknown height. She landed on concrete. There was no blood loss. Pain location: R chest. The pain is moderate. Exacerbated by: deep breath. Pertinent negatives include no abdominal pain, bowel incontinence, fever, headaches, nausea or vomiting. She has tried NSAID for the symptoms. The treatment provided significant relief. Chest Pain   This is a new problem. The current episode started in the past 7 days. The problem occurs constantly. The pain is present in the lateral region. The pain is moderate. The quality of the pain is described as sharp. The pain does not radiate. Pertinent negatives include no abdominal pain, cough, dizziness, fever, headaches, nausea, palpitations, shortness of breath, vomiting or weakness. She has tried NSAIDs for the symptoms. The treatment provided significant relief. Review of Systems   Constitutional: Negative for fatigue and fever. HENT: Negative for nosebleeds and sore throat. Respiratory: Negative for cough and shortness of breath. Cardiovascular: Positive for chest pain. Negative for palpitations and leg swelling. Gastrointestinal: Negative for abdominal pain, blood in stool, bowel incontinence, nausea and vomiting.    Neurological: Negative for dizziness, weakness and headaches. Objective   Physical Exam  Exam conducted with a chaperone present (Kala Finnegan). Constitutional:       Appearance: Normal appearance. HENT:      Head: Normocephalic and atraumatic. Eyes:      General: No scleral icterus. Conjunctiva/sclera: Conjunctivae normal.   Cardiovascular:      Rate and Rhythm: Normal rate and regular rhythm. Pulses: Normal pulses. Heart sounds: Normal heart sounds. Pulmonary:      Effort: Pulmonary effort is normal.      Breath sounds: Normal breath sounds. Chest:      Chest wall: Tenderness (R chest wall) present. Breasts:      Right: Tenderness present. Comments: Contusion over R breast   Musculoskeletal:         General: No swelling. Skin:     General: Skin is warm and dry. Neurological:      Mental Status: She is alert and oriented to person, place, and time. Mental status is at baseline. Psychiatric:         Mood and Affect: Mood normal.         Behavior: Behavior normal.              An electronic signature was used to authenticate this note.     --Mk Pringle MD

## 2022-04-29 DIAGNOSIS — R07.82 INTERCOSTAL PAIN: Primary | ICD-10-CM

## 2022-04-29 RX ORDER — LIDOCAINE 4 G/G
1 PATCH TOPICAL DAILY
Qty: 10 PATCH | Refills: 0 | Status: SHIPPED | OUTPATIENT
Start: 2022-04-29 | End: 2022-05-09

## 2022-05-09 ENCOUNTER — OFFICE VISIT (OUTPATIENT)
Dept: INTERNAL MEDICINE CLINIC | Age: 62
End: 2022-05-09
Payer: COMMERCIAL

## 2022-05-09 VITALS
BODY MASS INDEX: 28.03 KG/M2 | HEART RATE: 98 BPM | SYSTOLIC BLOOD PRESSURE: 124 MMHG | OXYGEN SATURATION: 99 % | TEMPERATURE: 98.1 F | DIASTOLIC BLOOD PRESSURE: 60 MMHG | WEIGHT: 158.25 LBS

## 2022-05-09 DIAGNOSIS — R21 SKIN ERUPTION: Primary | ICD-10-CM

## 2022-05-09 PROCEDURE — G8427 DOCREV CUR MEDS BY ELIG CLIN: HCPCS | Performed by: NURSE PRACTITIONER

## 2022-05-09 PROCEDURE — 1036F TOBACCO NON-USER: CPT | Performed by: NURSE PRACTITIONER

## 2022-05-09 PROCEDURE — 3017F COLORECTAL CA SCREEN DOC REV: CPT | Performed by: NURSE PRACTITIONER

## 2022-05-09 PROCEDURE — G8419 CALC BMI OUT NRM PARAM NOF/U: HCPCS | Performed by: NURSE PRACTITIONER

## 2022-05-09 PROCEDURE — 99213 OFFICE O/P EST LOW 20 MIN: CPT | Performed by: NURSE PRACTITIONER

## 2022-05-09 RX ORDER — VALACYCLOVIR HYDROCHLORIDE 1 G/1
1000 TABLET, FILM COATED ORAL 3 TIMES DAILY
Qty: 21 TABLET | Refills: 0 | Status: SHIPPED | OUTPATIENT
Start: 2022-05-09 | End: 2022-05-16

## 2022-05-09 ASSESSMENT — ENCOUNTER SYMPTOMS
SHORTNESS OF BREATH: 0
COUGH: 0

## 2022-05-09 NOTE — PATIENT INSTRUCTIONS
Start valtrex    Patient Education        Shingles: Care Instructions  Your Care Instructions     Shingles (herpes zoster) causes pain and a blistered rash. The rash can appear anywhere on the body but will be on only one side of the body, the left or right. It will be in a band, a strip, or a small area. The pain can be very severe. Shingles can also cause tingling or itching in the area of the rash. The blisters scab over after a few days and heal in 2 to 4 weeks. Medicines can help you feel better and may help prevent more serious problems caused byshingles. Shingles is caused by the same virus that causes chickenpox. When you have chickenpox, the virus gets into your nerve roots and stays there (becomes dormant) long after you get over the chickenpox. If the virus becomes activeagain, it can cause shingles. Follow-up care is a key part of your treatment and safety. Be sure to make and go to all appointments, and call your doctor if you are having problems. It's also a good idea to know your test results and keep alist of the medicines you take. How can you care for yourself at home?  Be safe with medicines. Take your medicines exactly as prescribed. Call your doctor if you think you are having a problem with your medicine. Antiviral medicine helps you get better faster.  Try not to scratch or pick at the blisters. They will crust over and fall off on their own if you leave them alone.  Put cool, wet cloths on the area to relieve pain and itching. You can also use calamine lotion. Try not to use so much lotion that it cakes and is hard to get off.  Put cornstarch or baking soda on the sores to help dry them out so they heal faster.  Do not use thick ointment, such as petroleum jelly, on the sores. This will keep them from drying and healing.  To help remove loose crusts, soak them in tap water. This can help decrease oozing, and dry and soothe the skin.    Take an over-the-counter pain medicine, such as acetaminophen (Tylenol), ibuprofen (Advil, Motrin), or naproxen (Aleve). Read and follow all instructions on the label.  Avoid close contact with people until the blisters have healed. It is very important for you to avoid contact with anyone who has never had chickenpox or the chickenpox vaccine. Armando Bang babies and anyone who is pregnant or has a hard time fighting infection (such as someone with HIV, diabetes, or cancer) is especially at risk. When should you call for help? Call your doctor now or seek immediate medical care if:     You have a new or higher fever.      You have a severe headache and a stiff neck.      You lose the ability to think clearly.      The rash spreads to your forehead, nose, eyes, or eyelids.      You have eye pain, or your vision gets worse.      You have new pain in your face, or you cannot move the muscles in your face.      Blisters spread to new parts of your body. Watch closely for changes in your health, and be sure to contact your doctor if:     The rash has not healed after 2 to 4 weeks.      You still have pain after the rash has healed. Where can you learn more? Go to [email or web address] and sign in to your [employer] account. Lore Zaragoza in the [facility] box to learn more about \"Shingles: Care Instructions. \"     If you do not have an account, please click on the \"Sign Up Now\" link. Current as of: July 1, 2021               Content Version: 13.2  © 2006-2022 Healthwise, Incorporated. Care instructions adapted under license by [facility] ([facility]). If you have questions about a medical condition or this instruction, always ask your healthcare professional. [address] any warranty or liability for your use of this information.

## 2022-05-09 NOTE — PROGRESS NOTES
Subjective     CC:   Chief Complaint   Patient presents with    Breast Problem     Has a rash/ blisters under her right breast.  Unsure if she is allergic to pain patch ( Nikia Lanza)       SALTY Traore is a 65 yo female, visit today for skin problem. Saw PCP 4/28/2022 for right breast bruising following a fall. She started salon pas for the pain. She developed blisters 3-4 days ago where the patch had been. The rash is painful. Describes this as \"just hurting. \" This has not spread. She stopped applying the patch. Blisters have crusted over. Review of Systems   Constitutional: Negative for chills and fever. Respiratory: Negative for cough and shortness of breath. Skin: Positive for rash. Objective   Vitals:    05/09/22 1311   BP: 124/60   Site: Left Upper Arm   Position: Sitting   Cuff Size: Medium Adult   Pulse: 98   Temp: 98.1 °F (36.7 °C)   TempSrc: Oral   SpO2: 99%   Weight: 158 lb 4 oz (71.8 kg)     Body mass index is 28.03 kg/m². Wt Readings from Last 3 Encounters:   05/09/22 158 lb 4 oz (71.8 kg)   04/28/22 158 lb (71.7 kg)   02/28/22 154 lb 9.6 oz (70.1 kg)     BP Readings from Last 3 Encounters:   05/09/22 124/60   04/28/22 102/64   03/24/22 122/64      Physical Exam  Vitals reviewed. Constitutional:       General: She is not in acute distress. HENT:      Head: Normocephalic and atraumatic. Pulmonary:      Effort: Pulmonary effort is normal.   Skin:     General: Skin is warm and dry. Findings: Rash present. Rash is vesicular. Comments: Vesicular rash on erythematous base right breast.  Crusted. Neurological:      General: No focal deficit present. Mental Status: She is alert and oriented to person, place, and time. Psychiatric:         Mood and Affect: Mood normal.         Behavior: Behavior normal.         Thought Content: Thought content normal.         Judgment: Judgment normal.          Diagnosis Orders   1.  Skin eruption  valACYclovir (VALTREX) 1 g tablet           Plan   1. Skin eruption: Appearance of rash and prodromal pain consistent with shingles. - valACYclovir (VALTREX) 1 g tablet; Take 1 tablet by mouth 3 times daily for 7 days  Dispense: 21 tablet; Refill: 0   -Start Valtrex. Patient is taking as needed Valtrex, not currently taking. She is aware she should not take her as needed dosing while taking this. No follow-ups on file. OR sooner with questions, concerns, worsening symptoms      -Patient verbalized understanding and agreement to plan. Please note that this chart was generated using dragon dictation software. Although every effort was made to ensure the accuracy of this automated transcription, some errors in transcription may have occurred.

## 2022-05-16 NOTE — TELEPHONE ENCOUNTER
Received a call from Terry, at the  that pt called in to cancel ablation. Ablation was cancelled, Amos Branham was update and Andres Baca in the cath lab was notified. Per Dr. Avila Shape do not call to reschedule, pt will call in if wanting to reschedule.

## 2022-05-26 DIAGNOSIS — E78.2 MIXED HYPERLIPIDEMIA: ICD-10-CM

## 2022-05-26 NOTE — TELEPHONE ENCOUNTER
LAST OFFICE VISIT :04/28/2022    Future Appointments   Date Time Provider Mitra Zapata   7/8/2022 10:30 AM Dilcia Reed MD [facility]

## 2022-05-27 ENCOUNTER — TELEPHONE (OUTPATIENT)
Dept: INTERNAL MEDICINE CLINIC | Age: 62
End: 2022-05-27

## 2022-05-27 DIAGNOSIS — E78.2 MIXED HYPERLIPIDEMIA: ICD-10-CM

## 2022-05-27 DIAGNOSIS — E11.9 TYPE 2 DIABETES MELLITUS WITHOUT COMPLICATION, WITHOUT LONG-TERM CURRENT USE OF INSULIN (HCC): ICD-10-CM

## 2022-05-27 RX ORDER — ATORVASTATIN CALCIUM 40 MG/1
TABLET, FILM COATED ORAL
Qty: 90 TABLET | Refills: 1 | Status: SHIPPED | OUTPATIENT
Start: 2022-05-27

## 2022-05-27 RX ORDER — ATORVASTATIN CALCIUM 40 MG/1
TABLET, FILM COATED ORAL
Qty: 90 TABLET | OUTPATIENT
Start: 2022-05-27

## 2022-05-27 RX ORDER — GLYBURIDE-METFORMIN HYDROCHLORIDE 5; 500 MG/1; MG/1
TABLET ORAL
Qty: 180 TABLET | Refills: 1 | Status: SHIPPED | OUTPATIENT
Start: 2022-05-27

## 2022-05-27 NOTE — TELEPHONE ENCOUNTER
Patient calling for refills on the following medications Metoprolol tartrate 25 MG says she takent her last dose on 05/26/22, and Atorvastatin (LIPITOR) 40 MG tablet   Please Advise

## 2022-07-08 ENCOUNTER — OFFICE VISIT (OUTPATIENT)
Dept: INTERNAL MEDICINE CLINIC | Age: 62
End: 2022-07-08
Payer: COMMERCIAL

## 2022-07-08 VITALS
HEIGHT: 63 IN | RESPIRATION RATE: 18 BRPM | SYSTOLIC BLOOD PRESSURE: 122 MMHG | WEIGHT: 159 LBS | BODY MASS INDEX: 28.17 KG/M2 | OXYGEN SATURATION: 98 % | HEART RATE: 86 BPM | DIASTOLIC BLOOD PRESSURE: 70 MMHG

## 2022-07-08 DIAGNOSIS — E78.2 MIXED HYPERLIPIDEMIA: ICD-10-CM

## 2022-07-08 DIAGNOSIS — I10 ESSENTIAL HYPERTENSION: ICD-10-CM

## 2022-07-08 DIAGNOSIS — E11.9 TYPE 2 DIABETES MELLITUS WITHOUT COMPLICATION, WITHOUT LONG-TERM CURRENT USE OF INSULIN (HCC): Primary | ICD-10-CM

## 2022-07-08 PROCEDURE — G8427 DOCREV CUR MEDS BY ELIG CLIN: HCPCS | Performed by: INTERNAL MEDICINE

## 2022-07-08 PROCEDURE — 2022F DILAT RTA XM EVC RTNOPTHY: CPT | Performed by: INTERNAL MEDICINE

## 2022-07-08 PROCEDURE — 1036F TOBACCO NON-USER: CPT | Performed by: INTERNAL MEDICINE

## 2022-07-08 PROCEDURE — 99214 OFFICE O/P EST MOD 30 MIN: CPT | Performed by: INTERNAL MEDICINE

## 2022-07-08 PROCEDURE — 3046F HEMOGLOBIN A1C LEVEL >9.0%: CPT | Performed by: INTERNAL MEDICINE

## 2022-07-08 PROCEDURE — G8419 CALC BMI OUT NRM PARAM NOF/U: HCPCS | Performed by: INTERNAL MEDICINE

## 2022-07-08 PROCEDURE — 3017F COLORECTAL CA SCREEN DOC REV: CPT | Performed by: INTERNAL MEDICINE

## 2022-07-08 RX ORDER — VALSARTAN AND HYDROCHLOROTHIAZIDE 160; 12.5 MG/1; MG/1
1 TABLET, FILM COATED ORAL DAILY
Qty: 90 TABLET | Refills: 1 | Status: SHIPPED | OUTPATIENT
Start: 2022-07-08 | End: 2022-10-06

## 2022-07-08 ASSESSMENT — PATIENT HEALTH QUESTIONNAIRE - PHQ9
SUM OF ALL RESPONSES TO PHQ QUESTIONS 1-9: 0
SUM OF ALL RESPONSES TO PHQ QUESTIONS 1-9: 0
2. FEELING DOWN, DEPRESSED OR HOPELESS: 0
SUM OF ALL RESPONSES TO PHQ QUESTIONS 1-9: 0
SUM OF ALL RESPONSES TO PHQ QUESTIONS 1-9: 0
1. LITTLE INTEREST OR PLEASURE IN DOING THINGS: 0
SUM OF ALL RESPONSES TO PHQ9 QUESTIONS 1 & 2: 0

## 2022-07-08 ASSESSMENT — ENCOUNTER SYMPTOMS
BLOOD IN STOOL: 0
COUGH: 0
NAUSEA: 0
VOMITING: 0
ABDOMINAL PAIN: 0
SORE THROAT: 0
SHORTNESS OF BREATH: 0

## 2022-07-08 NOTE — PROGRESS NOTES
Gregoria Cuevas (:  1960) is a 64 y.o. female, Established patient, here for evaluation of the following chief complaint(s):  Hypertension         ASSESSMENT/PLAN:  1. Type 2 diabetes mellitus without complication, without long-term current use of insulin (HCC)  - Stable   - Continue current dose of glyburide-metformin  -     Hemoglobin A1C; Future  -      DIABETES FOOT EXAM  2. Essential hypertension  - Stable   - Continue current dose of metoprolol, valsartan-hydrochlorothiazide  -     metoprolol tartrate (LOPRESSOR) 25 MG tablet; Take 1 tablet by mouth 2 times daily, Disp-180 tablet, R-1Normal  -     valsartan-hydroCHLOROthiazide (DIOVAN-HCT) 160-12.5 MG per tablet; Take 1 tablet by mouth daily, Disp-90 tablet, R-1Normal  3. Mixed hyperlipidemia  - Stable   - Continue current dose of atorvastatin  -     Lipid Panel; Future      Return in about 3 months (around 10/8/2022). Subjective   SUBJECTIVE/OBJECTIVE:  Diabetes  She presents for her follow-up diabetic visit. She has type 2 diabetes mellitus. Her disease course has been stable. Pertinent negatives for hypoglycemia include no dizziness. Pertinent negatives for diabetes include no chest pain, no fatigue and no weakness. There are no hypoglycemic complications. There are no diabetic complications. Risk factors for coronary artery disease include dyslipidemia, hypertension and diabetes mellitus. Current diabetic treatment includes oral agent (dual therapy). She is compliant with treatment all of the time. She is following a diabetic diet. She participates in exercise intermittently. An ACE inhibitor/angiotensin II receptor blocker is being taken. Hypertension  This is a chronic problem. The current episode started more than 1 year ago. The problem is controlled. Pertinent negatives include no chest pain, palpitations or shortness of breath. Past treatments include angiotensin blockers, beta blockers and diuretics.  The current treatment provides significant improvement. There are no compliance problems. Review of Systems   Constitutional: Negative for fatigue and fever. HENT: Negative for nosebleeds and sore throat. Respiratory: Negative for cough and shortness of breath. Cardiovascular: Negative for chest pain, palpitations and leg swelling. Gastrointestinal: Negative for abdominal pain, blood in stool, nausea and vomiting. Neurological: Negative for dizziness and weakness. Objective   Physical Exam  Constitutional:       Appearance: Normal appearance. HENT:      Head: Normocephalic and atraumatic. Eyes:      General: No scleral icterus. Conjunctiva/sclera: Conjunctivae normal.   Cardiovascular:      Rate and Rhythm: Normal rate and regular rhythm. Pulses: Normal pulses. Heart sounds: Normal heart sounds. Pulmonary:      Effort: Pulmonary effort is normal.      Breath sounds: Normal breath sounds. Musculoskeletal:         General: No swelling. Skin:     General: Skin is warm and dry. Neurological:      Mental Status: She is alert and oriented to person, place, and time. Mental status is at baseline. Psychiatric:         Mood and Affect: Mood normal.         Behavior: Behavior normal.            Visual inspection:  Deformity/amputation: absent  Skin lesions/pre-ulcerative calluses: absent  Edema: right- negative, left- negative    Sensory exam:  Monofilament sensation: normal  (minimum of 5 random plantar locations tested, avoiding callused areas - > 1 area with absence of sensation is + for neuropathy)    Plus at least one of the following:  Pulses: normal,   Proprioception: Intact  Vibration (128 Hz): Intact    An electronic signature was used to authenticate this note.     --Gabriel Newman MD

## 2022-07-22 ENCOUNTER — NURSE ONLY (OUTPATIENT)
Dept: INTERNAL MEDICINE CLINIC | Age: 62
End: 2022-07-22
Payer: COMMERCIAL

## 2022-07-22 DIAGNOSIS — E11.9 TYPE 2 DIABETES MELLITUS WITHOUT COMPLICATION, WITHOUT LONG-TERM CURRENT USE OF INSULIN (HCC): ICD-10-CM

## 2022-07-22 DIAGNOSIS — E78.2 MIXED HYPERLIPIDEMIA: ICD-10-CM

## 2022-07-22 DIAGNOSIS — Z23 NEED FOR PROPHYLACTIC VACCINATION WITH TETANUS-DIPHTHERIA (TD): ICD-10-CM

## 2022-07-22 LAB
CHOLESTEROL, TOTAL: 206 MG/DL (ref 0–199)
HDLC SERPL-MCNC: 51 MG/DL (ref 40–60)
LDL CHOLESTEROL CALCULATED: 101 MG/DL
TRIGL SERPL-MCNC: 270 MG/DL (ref 0–150)
VLDLC SERPL CALC-MCNC: 54 MG/DL

## 2022-07-22 PROCEDURE — 99999 PR OFFICE/OUTPT VISIT,PROCEDURE ONLY: CPT | Performed by: INTERNAL MEDICINE

## 2022-07-22 PROCEDURE — 90471 IMMUNIZATION ADMIN: CPT | Performed by: INTERNAL MEDICINE

## 2022-07-22 PROCEDURE — 90715 TDAP VACCINE 7 YRS/> IM: CPT | Performed by: INTERNAL MEDICINE

## 2022-07-22 PROCEDURE — 36415 COLL VENOUS BLD VENIPUNCTURE: CPT | Performed by: INTERNAL MEDICINE

## 2022-07-23 LAB
ESTIMATED AVERAGE GLUCOSE: 151.3 MG/DL
HBA1C MFR BLD: 6.9 %

## 2022-10-17 ENCOUNTER — OFFICE VISIT (OUTPATIENT)
Dept: INTERNAL MEDICINE CLINIC | Age: 62
End: 2022-10-17
Payer: COMMERCIAL

## 2022-10-17 VITALS
HEART RATE: 91 BPM | SYSTOLIC BLOOD PRESSURE: 119 MMHG | BODY MASS INDEX: 27.81 KG/M2 | OXYGEN SATURATION: 97 % | DIASTOLIC BLOOD PRESSURE: 78 MMHG | TEMPERATURE: 97.9 F | WEIGHT: 157 LBS

## 2022-10-17 DIAGNOSIS — I10 ESSENTIAL HYPERTENSION: ICD-10-CM

## 2022-10-17 DIAGNOSIS — E11.9 TYPE 2 DIABETES MELLITUS WITHOUT COMPLICATION, WITHOUT LONG-TERM CURRENT USE OF INSULIN (HCC): Primary | ICD-10-CM

## 2022-10-17 DIAGNOSIS — I48.19 PERSISTENT ATRIAL FIBRILLATION (HCC): ICD-10-CM

## 2022-10-17 DIAGNOSIS — E78.2 MIXED HYPERLIPIDEMIA: ICD-10-CM

## 2022-10-17 LAB — HBA1C MFR BLD: 6.5 %

## 2022-10-17 PROCEDURE — 99214 OFFICE O/P EST MOD 30 MIN: CPT | Performed by: INTERNAL MEDICINE

## 2022-10-17 PROCEDURE — 3017F COLORECTAL CA SCREEN DOC REV: CPT | Performed by: INTERNAL MEDICINE

## 2022-10-17 PROCEDURE — G8484 FLU IMMUNIZE NO ADMIN: HCPCS | Performed by: INTERNAL MEDICINE

## 2022-10-17 PROCEDURE — G8419 CALC BMI OUT NRM PARAM NOF/U: HCPCS | Performed by: INTERNAL MEDICINE

## 2022-10-17 PROCEDURE — 2022F DILAT RTA XM EVC RTNOPTHY: CPT | Performed by: INTERNAL MEDICINE

## 2022-10-17 PROCEDURE — 83036 HEMOGLOBIN GLYCOSYLATED A1C: CPT | Performed by: INTERNAL MEDICINE

## 2022-10-17 PROCEDURE — 1036F TOBACCO NON-USER: CPT | Performed by: INTERNAL MEDICINE

## 2022-10-17 PROCEDURE — G8427 DOCREV CUR MEDS BY ELIG CLIN: HCPCS | Performed by: INTERNAL MEDICINE

## 2022-10-17 PROCEDURE — 3044F HG A1C LEVEL LT 7.0%: CPT | Performed by: INTERNAL MEDICINE

## 2022-10-17 ASSESSMENT — ENCOUNTER SYMPTOMS
VOMITING: 0
NAUSEA: 0
SHORTNESS OF BREATH: 0
COUGH: 0
ABDOMINAL PAIN: 0
SORE THROAT: 0
BLOOD IN STOOL: 0

## 2022-10-25 RX ORDER — PROPAFENONE HYDROCHLORIDE 150 MG/1
TABLET, FILM COATED ORAL
Qty: 90 TABLET | Refills: 1 | Status: SHIPPED | OUTPATIENT
Start: 2022-10-25

## 2022-11-04 ENCOUNTER — TELEPHONE (OUTPATIENT)
Dept: INTERNAL MEDICINE CLINIC | Age: 62
End: 2022-11-04

## 2022-11-04 NOTE — TELEPHONE ENCOUNTER
Wonder where her paperwork is [facility] paperwork .      Please call [phone]

## 2022-11-08 NOTE — TELEPHONE ENCOUNTER
Spoke to patient , she is going to have them fax over the forms again since we did not receive them.

## 2022-11-28 ENCOUNTER — TELEPHONE (OUTPATIENT)
Dept: CARDIOLOGY CLINIC | Age: 62
End: 2022-11-28

## 2022-11-28 NOTE — TELEPHONE ENCOUNTER
Reji Walsh called into the office wanting a second opinion and would like to see another provider based on her health, other than Dr. Chelsea Duke. She would like a end of the year appointment with a new provider and can be reached at: [phone].

## 2022-11-28 NOTE — TELEPHONE ENCOUNTER
She had been seen by Dr. Britney Mg as well and can be seen at [facility]. Her last follow up with Dr. Britney Mg 6/23/2021. Thanks.

## 2022-11-29 NOTE — TELEPHONE ENCOUNTER
I called and spoke to [facility] and got her scheduled with Dr. Pricilla Gallegos at the [facility] for Friday 12/2/22

## 2022-12-01 ENCOUNTER — TELEPHONE (OUTPATIENT)
Dept: INTERNAL MEDICINE CLINIC | Age: 62
End: 2022-12-01

## 2022-12-01 DIAGNOSIS — E78.2 MIXED HYPERLIPIDEMIA: ICD-10-CM

## 2022-12-01 RX ORDER — ATORVASTATIN CALCIUM 40 MG/1
TABLET, FILM COATED ORAL
Qty: 90 TABLET | Refills: 1 | Status: SHIPPED | OUTPATIENT
Start: 2022-12-01

## 2022-12-01 NOTE — TELEPHONE ENCOUNTER
Pt  called for an Rx for Atorvastatin 40 mg, #90, 1 po qd. & refills;    [address]

## 2022-12-02 ENCOUNTER — TELEPHONE (OUTPATIENT)
Dept: CARDIOLOGY CLINIC | Age: 62
End: 2022-12-02

## 2022-12-02 ENCOUNTER — OFFICE VISIT (OUTPATIENT)
Dept: CARDIOLOGY CLINIC | Age: 62
End: 2022-12-02
Payer: COMMERCIAL

## 2022-12-02 VITALS
WEIGHT: 157 LBS | OXYGEN SATURATION: 99 % | HEIGHT: 63 IN | BODY MASS INDEX: 27.82 KG/M2 | DIASTOLIC BLOOD PRESSURE: 74 MMHG | HEART RATE: 88 BPM | SYSTOLIC BLOOD PRESSURE: 122 MMHG

## 2022-12-02 DIAGNOSIS — I48.92 LEFT ATRIAL FLUTTER BY ELECTROCARDIOGRAM (HCC): Primary | ICD-10-CM

## 2022-12-02 DIAGNOSIS — I48.92 RIGHT ATRIAL FLUTTER BY ELECTROCARDIOGRAM (HCC): ICD-10-CM

## 2022-12-02 DIAGNOSIS — I10 ESSENTIAL HYPERTENSION: ICD-10-CM

## 2022-12-02 DIAGNOSIS — E78.2 MIXED HYPERLIPIDEMIA: ICD-10-CM

## 2022-12-02 DIAGNOSIS — I48.19 PERSISTENT ATRIAL FIBRILLATION (HCC): ICD-10-CM

## 2022-12-02 PROCEDURE — G8419 CALC BMI OUT NRM PARAM NOF/U: HCPCS | Performed by: INTERNAL MEDICINE

## 2022-12-02 PROCEDURE — 99214 OFFICE O/P EST MOD 30 MIN: CPT | Performed by: INTERNAL MEDICINE

## 2022-12-02 PROCEDURE — G8484 FLU IMMUNIZE NO ADMIN: HCPCS | Performed by: INTERNAL MEDICINE

## 2022-12-02 PROCEDURE — 1036F TOBACCO NON-USER: CPT | Performed by: INTERNAL MEDICINE

## 2022-12-02 PROCEDURE — G8427 DOCREV CUR MEDS BY ELIG CLIN: HCPCS | Performed by: INTERNAL MEDICINE

## 2022-12-02 PROCEDURE — 3078F DIAST BP <80 MM HG: CPT | Performed by: INTERNAL MEDICINE

## 2022-12-02 PROCEDURE — 93000 ELECTROCARDIOGRAM COMPLETE: CPT | Performed by: INTERNAL MEDICINE

## 2022-12-02 PROCEDURE — 3017F COLORECTAL CA SCREEN DOC REV: CPT | Performed by: INTERNAL MEDICINE

## 2022-12-02 PROCEDURE — 3074F SYST BP LT 130 MM HG: CPT | Performed by: INTERNAL MEDICINE

## 2022-12-02 RX ORDER — PROPAFENONE HYDROCHLORIDE 150 MG/1
150 TABLET, FILM COATED ORAL EVERY 8 HOURS
Qty: 270 TABLET | Refills: 3 | Status: SHIPPED | OUTPATIENT
Start: 2022-12-02

## 2022-12-02 NOTE — PROGRESS NOTES
[facility]      Cardiology Progress Note    Roxana Gomez  1960 December 2, 2022     CC: \"I do not feel that I am out of rhythm. \"     HPI:  The patient is 58 y.o. female with a past medical history significant for DM II, HTN, HLD and persistent atrial fibrillation, left and right atrial flutter. Referral to Dr. Michael Estes 7/2020 and again 8/2021. Since that time, on 10/21/2021 s/p AFIB, L flutter and R flutter ablation. Flecainide changed to Rhythmol due to side effects. EKG 1/24/22 controlled AFIB followed by s/p successful DCCV 1/28/22 persistent L flutter followed by s/p persistent symptomatic atrial flutter DCCV on 3/11/22. EKG 3/24/22 SR. Has not followed up in our clinic since that time. Today, she reports \"I have not been following up with Dr. Michael Estes. \" When told she is in atrial flutter-fibrillation today or since her cardioversion in March. She notes the random, occasional waking up with chest discomfort that is brief and fleeting. Otherwise, the patient denies exertional chest pain/pressure, dyspnea at rest, BORJA, PND, orthopnea, palpitations, heart racing, fluttering, pounding, lightheadedness, dizziness, weight changes, changes in LE edema, near syncope and syncope. She reports she has only been taking her Rythmol BID not every 8 hours as prescribe per her medication list. Otherwise reports compliance and tolerating medical therapy.          Past Medical History:   Diagnosis Date    Atrial fibrillation, controlled ([address] 3/27/2013    Essential hypertension 11/1/2011    Melanoma ([address]     Mixed hyperlipidemia 11/1/2011    Psoriasis     Type II or unspecified type diabetes mellitus without mention of complication, not stated as uncontrolled      Past Surgical History:   Procedure Laterality Date    BREAST REDUCTION SURGERY  2017    CARDIOVERSION N/A 03/07/2022    COLONOSCOPY N/A 10/23/2020    COLONOSCOPY WITH BIOPSY performed by Kelly Styles MD at [facility] REPLACEMENT Right 12/28/2020    LUMBAR DISC SURGERY      ROTATOR CUFF REPAIR Right December 2014    Dr. Debra Alan at [facility] surgical center     Family History   Problem Relation Age of Onset    Other Mother     Heart Disease Mother     Heart Disease Father     Atrial Fibrillation Father     Cancer Sister      Social History     Tobacco Use    Smoking status: Never    Smokeless tobacco: Never   Vaping Use    Vaping Use: Never used   Substance Use Topics    Alcohol use: Yes     Comment: rare    Drug use: No       Allergies   Allergen Reactions    Codeine Nausea And Vomiting     Current Outpatient Medications   Medication Sig Dispense Refill    atorvastatin (LIPITOR) 40 MG tablet Take 1 tablet by mouth daily 90 tablet 1    propafenone (RYTHMOL) 150 MG tablet TAKE ONE TABLET BY MOUTH EVERY 8 HOURS (Patient taking differently: Patient taking twice a day.) 90 tablet 1    metoprolol tartrate (LOPRESSOR) 25 MG tablet Take 1 tablet by mouth 2 times daily 180 tablet 1    valsartan-hydroCHLOROthiazide (DIOVAN-HCT) 160-12.5 MG per tablet Take 1 tablet by mouth daily 90 tablet 1    glyBURIDE-metFORMIN (GLUCOVANCE) 5-500 MG per tablet Take 1 tablet by mouth twice a day 180 tablet 1    rivaroxaban (XARELTO) 20 MG TABS tablet TAKE ONE TABLET BY MOUTH DAILY 90 tablet 3    valACYclovir (VALTREX) 500 MG tablet       dicyclomine (BENTYL) 20 MG tablet Take 20 mg by mouth 4 times daily as needed      Apremilast 30 MG TABS Take 30 mg by mouth daily        No current facility-administered medications for this visit. Review of Systems:  Constitutional: no unanticipated weight loss. There's been no change in energy level, sleep pattern, or activity level. No fevers, chills. Eyes: No visual changes or diplopia. No scleral icterus. ENT: No Headaches, hearing loss or vertigo. No mouth sores or sore throat. Cardiovascular: as reviewed in HPI  Respiratory: No cough or wheezing, no sputum production. No hematemesis.     Gastrointestinal: No abdominal pain, appetite loss, blood in stools. No change in bowel or bladder habits. Genitourinary: No dysuria, trouble voiding, or hematuria. Musculoskeletal:  No gait disturbance, no joint complaints. Integumentary: No rash or pruritis. Neurological: No headache, diplopia, change in muscle strength, numbness or tingling. Psychiatric: No anxiety or depression. Endocrine: No temperature intolerance. No excessive thirst, fluid intake, or urination. No tremor. Hematologic/Lymphatic: No abnormal bruising or bleeding, blood clots or swollen lymph nodes. Allergic/Immunologic: No nasal congestion or hives. Physical Exam:   /74   Pulse 88   Ht 5' 3\" (1.6 m)   Wt 157 lb (71.2 kg)   SpO2 99%   BMI 27.81 kg/m²   Wt Readings from Last 3 Encounters:   12/02/22 157 lb (71.2 kg)   10/17/22 157 lb (71.2 kg)   07/08/22 159 lb (72.1 kg)     Constitutional: The patient is oriented to person, place, and time. Appears well-developed and well-nourished. In no acute distress. Head: Normocephalic and atraumatic. Pupils equal and round. Neck: Neck supple. No JVP or carotid bruit appreciated. No mass and no thyromegaly present. No lymphadenopathy present. Cardiovascular: Irregularly irregular. Normal heart sounds. Exam reveals no gallop and no friction rub. No murmur heard. Pulmonary/Chest: Effort normal and breath sounds normal. No respiratory distress. No wheezes, rhonchi or rales. Abdominal: Soft, non-tender. Bowel sounds are normal. Exhibits no organomegaly, mass or bruit. Extremities: No edema. No cyanosis or clubbing. Pulses are 2+ radial and carotid bilaterally. Neurological: No gross cranial nerve deficit. Coordination normal.   Skin: Skin is warm and dry. There is no rash or diaphoresis. Psychiatric: Patient has a normal mood and affect.  Speech is normal and behavior is normal.     Lab Review:    Lab Results   Component Value Date/Time    TRIG 270 07/22/2022 08:37 AM    HDL 51 07/22/2022 08:37 AM    HDL 48 11/01/2011 11:10 AM    LDLCALC 101 07/22/2022 08:37 AM    LDLDIRECT 158 11/11/2014 09:44 AM    LABVLDL 54 07/22/2022 08:37 AM     Lab Results   Component Value Date/Time     03/07/2022 12:09 PM     Lab Results   Component Value Date/Time    HGB 12.4 03/07/2022 12:09 PM    HCT 36.3 03/07/2022 12:09 PM      Lab Results   Component Value Date/Time     03/07/2022 12:09 PM      Lab Results   Component Value Date/Time    K 4.7 03/07/2022 12:09 PM    K 4.4 06/10/2021 05:26 PM     Lab Results   Component Value Date/Time    BUN 22 03/07/2022 12:09 PM    CREATININE 0.9 03/07/2022 12:09 PM     EKG Interpretation:   12/2/22: Atrial flutter-fibrillation Low voltage in precordial leads. Voltage criteria for LVH  (R(I)+S(III) exceeds 2.00 mV). ~89 bpm.   12/2/22: controlled atrial fibrillation-flutter     Image Review:     ECHO:  3/27/13  EF 50-55%    Stress test: 6/14/21   Normal myocardial perfusion study. Normal LV size and systolic function. Patient in atrial fibrillation. Overall findings represent a low risk study. Assessment/Plan:      Atrial fibrillation and R&LFlutter   Managed per Dr. Arben Richmond consultation 7/2020-decision to not proceed with ablation at that time. Re-established 8/2021  10/21/2021 AFIB, L flutter and R flutter ablation. Flecainide changed to Rhythmol due to side effects. EKG 1/24/22 controlled AFIB  S/p DCCV 1/28/22 persistent L flutter  S/p persistent symptomatic atrial flutter DCCV 3/11/22    -Today, she returns in follow up after her last cardioversion 3/11/22 and has not follow up with our clinic since that time.   -she reports she is not taking Rythmol TID but taking BID. I have instructed her to take as instructed every 8 hours.   -Leaving for [facility] next week for family matters returning 12/20/22.   -She is currently on Rhythmol, Lopressor, Xarelto   EKG today atrial flutter Low voltage in precordial leads.   Voltage criteria for LVH (R(I)+S(III) exceeds 2.00 mV). ~89 bpm.  -She does not want to consider repeat ablation at this time  -we will plan for DCCV upon her return after 12/20/22. Essential hypertension  Controlled on medical therapy. She will continue current medical management. BMP 3/7/22 BUN 22 otherwise wnl. Mixed hyperlipidemia  10/2019 HDL 65, LDLc 73,   7/2020 HDL 62, LDL 92,   7/2022 HDL 51, , ,    7/2022 A1c 6.9, 10/17/22 A1c 6.5  Denies any myaglias. Lipitor 40 mg daily. LDL goal <100 since she has no known CAD but has DM-II. DM-II  On glyburide-metformin       Instructed to obtain flu vaccine this season, annually and obtain COVID-19 vaccine per guidelines. She will follow up after DCCV. Thank you very much for allowing me to participate in the care of your patient. Please do not hesitate to contact me if you have any questions. Sincerely,  Nando Delgadillo MD      [facility] [address] [place] [address]  Ph: [phone]  Fax: [phone]    This note was scribed in the presence of Dr. Ryan Billings MD by Anibal Ferrari RN.

## 2022-12-02 NOTE — TELEPHONE ENCOUNTER
Patient seen today in the office and will need schedule upon her return from traveling 12/20/22 but prior to Dr. Pricilla Gallegos leaving out of town for the holidays. Any questions. Let me know. Thanks. Cardioversion with Dr. Pricilla Gallegos at [facility] [place]   The morning of your Procedure-cardioversion you will park in the hospital parking lot and report directly to the cath lab to check in. [address]. DATE: ____________     TIME: _____________    ARRIVAL TIME: ______________      Pre-Procedure Instructions:  Do not eat or drink anything 8 hours before your procedure. Hold all diabetic medications the morning of the procedure including, Metfomin or any diabetic medications. If you take (long acting insulin) Lantus/Levemir only take ½ your normal dose the evening before. All other medications can be taken in the morning with sips of water. Do not hold anticoagulation therapy: warfarin, eliquis, xarelto therapy. Do not use any lotions, creams or perfume the morning of procedure. Labs prior to procedure (has to be less than 13 days)  Please arrive 1 hour prior to procedure time. Please have a responsible adult to drive you home after procedure. It is recommended you do not drive for 24 hours after procedure. Cath lab will provide you with all post procedure instructions. If you have any questions regarding the procedure itself or medications please call [phone] and ask to speak with a nurse. Kay-Procedure  will call to schedule.

## 2022-12-02 NOTE — PATIENT INSTRUCTIONS
Dr. Chelita Wilkes at [facility] 59 with Dr. Isabel Daigle at [facility] [place]   The morning of your Procedure-cardioversion you will park in the hospital parking lot and report directly to the cath lab to check in. [address] [facility]. DATE: ____________     TIME: _____________    ARRIVAL TIME: ______________      Pre-Procedure Instructions:  Do not eat or drink anything 8 hours before your procedure. Hold all diabetic medications the morning of the procedure including, Metfomin or any diabetic medications. If you take (long acting insulin) Lantus/Levemir only take ½ your normal dose the evening before. All other medications can be taken in the morning with sips of water. Do not hold anticoagulation therapy: warfarin, eliquis, xarelto therapy. Do not use any lotions, creams or perfume the morning of procedure. Please arrive 1 hour prior to procedure time. Please have a responsible adult to drive you home after procedure. It is recommended you do not drive for 24 hours after procedure. Cath lab will provide you with all post procedure instructions. If you have any questions regarding the procedure itself or medications please call [phone] and ask to speak with a nurse. Kay-Procedure  will call to schedule.

## 2022-12-05 NOTE — TELEPHONE ENCOUNTER
[facility] for patient to return call to get scheduled for procedure. Cardioversion with Dr. George Uribe at [facility] [place]   The morning of your Procedure-cardioversion you will park in the hospital parking lot and report directly to the cath lab to check in. [address] [facility]. DATE: ____________      TIME: _____________     ARRIVAL TIME: ______________        Pre-Procedure Instructions:  Do not eat or drink anything 8 hours before your procedure. Hold all diabetic medications the morning of the procedure including, Metfomin or any diabetic medications. If you take (long acting insulin) Lantus/Levemir only take ½ your normal dose the evening before. All other medications can be taken in the morning with sips of water. Do not hold anticoagulation therapy: warfarin, eliquis, xarelto therapy. Do not use any lotions, creams or perfume the morning of procedure. Labs prior to procedure (has to be less than 13 days)  Please arrive 1 hour prior to procedure time. Please have a responsible adult to drive you home after procedure. It is recommended you do not drive for 24 hours after procedure. Cath lab will provide you with all post procedure instructions. If you have any questions regarding the procedure itself or medications please call [phone] and ask to speak with a nurse.

## 2022-12-06 NOTE — TELEPHONE ENCOUNTER
Kay Ute  You 17 minutes ago (4:50 PM)     Please let Dr William Fuchs and Jeremy Chirinos know of this procedure. Pt didn't want to miss anymore work and couldn't schedule before Zaragoza's holiday vacation time.  This was her next day off so we scheduled with Jeremy Chirinos

## 2022-12-06 NOTE — TELEPHONE ENCOUNTER
Spoke with the patient and got her scheduled for the procedure. We are going to do this while  is out of the office. This is what works best for her work schedule. We went over the pre-procedure instructions below and she verbalized understanding. Cardioversion with Dr. Geno Christian at [facility] [place]   The morning of your Procedure-cardioversion you will park in the hospital parking lot and report directly to the cath lab to check in. [address] DATE:12/30/2022     Arrival time: 7:00 am   Procedure time: 8:00 am         Pre-Procedure Instructions:  Do not eat or drink anything 8 hours before your procedure. Hold all diabetic medications the morning of the procedure including, Metfomin or any diabetic medications. If you take (long acting insulin) Lantus/Levemir only take ½ your normal dose the evening before. All other medications can be taken in the morning with sips of water. Do not hold anticoagulation therapy: warfarin, eliquis, xarelto therapy. Do not use any lotions, creams or perfume the morning of procedure. Labs prior to procedure (has to be less than 13 days)  Please arrive 1 hour prior to procedure time. Please have a responsible adult to drive you home after procedure. It is recommended you do not drive for 24 hours after procedure. Cath lab will provide you with all post procedure instructions. If you have any questions regarding the procedure itself or medications please call [phone] and ask to speak with a nurse.      [employer] updated & emailed cath lab

## 2022-12-27 ENCOUNTER — HOSPITAL ENCOUNTER (OUTPATIENT)
Age: 62
Discharge: HOME | End: 2022-12-27
Payer: COMMERCIAL

## 2022-12-27 DIAGNOSIS — I48.19 PERSISTENT ATRIAL FIBRILLATION (HCC): ICD-10-CM

## 2022-12-27 DIAGNOSIS — I48.92 RIGHT ATRIAL FLUTTER BY ELECTROCARDIOGRAM (HCC): ICD-10-CM

## 2022-12-27 DIAGNOSIS — I48.92 LEFT ATRIAL FLUTTER BY ELECTROCARDIOGRAM (HCC): ICD-10-CM

## 2022-12-27 LAB
ANION GAP SERPL CALCULATED.3IONS-SCNC: 13 MMOL/L (ref 3–16)
BUN BLDV-MCNC: 28 MG/DL (ref 7–20)
CALCIUM SERPL-MCNC: 9.9 MG/DL (ref 8.3–10.6)
CHLORIDE BLD-SCNC: 103 MMOL/L (ref 99–110)
CO2: 24 MMOL/L (ref 21–32)
CREAT SERPL-MCNC: 1.2 MG/DL (ref 0.6–1.2)
GFR SERPL CREATININE-BSD FRML MDRD: 51 ML/MIN/{1.73_M2}
GLUCOSE BLD-MCNC: 122 MG/DL (ref 70–99)
HCT VFR BLD CALC: 39.7 % (ref 36–48)
HEMOGLOBIN: 13.2 G/DL (ref 12–16)
MCH RBC QN AUTO: 32.6 PG (ref 26–34)
MCHC RBC AUTO-ENTMCNC: 33.3 G/DL (ref 31–36)
MCV RBC AUTO: 98 FL (ref 80–100)
PDW BLD-RTO: 12.4 % (ref 12.4–15.4)
PLATELET # BLD: 347 K/UL (ref 135–450)
PMV BLD AUTO: 9.1 FL (ref 5–10.5)
POTASSIUM SERPL-SCNC: 4.4 MMOL/L (ref 3.5–5.1)
RBC # BLD: 4.05 M/UL (ref 4–5.2)
SODIUM BLD-SCNC: 140 MMOL/L (ref 136–145)
WBC # BLD: 6.6 K/UL (ref 4–11)

## 2022-12-27 PROCEDURE — 80048 BASIC METABOLIC PNL TOTAL CA: CPT

## 2022-12-27 PROCEDURE — 85027 COMPLETE CBC AUTOMATED: CPT

## 2022-12-27 PROCEDURE — 36415 COLL VENOUS BLD VENIPUNCTURE: CPT

## 2022-12-29 DIAGNOSIS — I48.91 ATRIAL FIBRILLATION, UNSPECIFIED TYPE (HCC): ICD-10-CM

## 2022-12-29 RX ORDER — SODIUM CHLORIDE 0.9 % (FLUSH) 0.9 %
5-40 SYRINGE (ML) INJECTION PRN
OUTPATIENT
Start: 2022-12-30

## 2022-12-29 RX ORDER — SODIUM CHLORIDE 0.9 % (FLUSH) 0.9 %
5-40 SYRINGE (ML) INJECTION EVERY 12 HOURS SCHEDULED
OUTPATIENT
Start: 2022-12-30

## 2022-12-29 RX ORDER — SODIUM CHLORIDE 9 MG/ML
INJECTION, SOLUTION INTRAVENOUS PRN
OUTPATIENT
Start: 2022-12-30

## 2022-12-29 NOTE — TELEPHONE ENCOUNTER
Roma Wright from the cath lab called to confirm cancellation of procedure  Dr. July Fung notified      Lvm notifying pt to call to reschedule once she is feeling better

## 2022-12-29 NOTE — TELEPHONE ENCOUNTER
Patricia Anderson states that she has developed cold symptoms and does not think that she can continue with her procedure scheduled for .       Patricia Anderson can be reached for [phone]

## 2022-12-30 ENCOUNTER — HOSPITAL ENCOUNTER (OUTPATIENT)
Dept: CARDIAC CATH/INVASIVE PROCEDURES | Age: 62
Discharge: HOME | End: 2022-12-30

## 2023-01-04 NOTE — TELEPHONE ENCOUNTER
Spoke with the patient and she still isn't feeling well. She is going to call me when feeling better or I will touch base end of next week .

## 2023-01-10 ENCOUNTER — TELEMEDICINE (OUTPATIENT)
Dept: INTERNAL MEDICINE CLINIC | Age: 63
End: 2023-01-10
Payer: COMMERCIAL

## 2023-01-10 DIAGNOSIS — R05.1 ACUTE COUGH: ICD-10-CM

## 2023-01-10 DIAGNOSIS — B96.89 ACUTE BACTERIAL SINUSITIS: Primary | ICD-10-CM

## 2023-01-10 DIAGNOSIS — J01.90 ACUTE BACTERIAL SINUSITIS: Primary | ICD-10-CM

## 2023-01-10 PROCEDURE — G8419 CALC BMI OUT NRM PARAM NOF/U: HCPCS | Performed by: INTERNAL MEDICINE

## 2023-01-10 PROCEDURE — 3017F COLORECTAL CA SCREEN DOC REV: CPT | Performed by: INTERNAL MEDICINE

## 2023-01-10 PROCEDURE — G8484 FLU IMMUNIZE NO ADMIN: HCPCS | Performed by: INTERNAL MEDICINE

## 2023-01-10 PROCEDURE — 1036F TOBACCO NON-USER: CPT | Performed by: INTERNAL MEDICINE

## 2023-01-10 PROCEDURE — G8427 DOCREV CUR MEDS BY ELIG CLIN: HCPCS | Performed by: INTERNAL MEDICINE

## 2023-01-10 PROCEDURE — 99214 OFFICE O/P EST MOD 30 MIN: CPT | Performed by: INTERNAL MEDICINE

## 2023-01-10 RX ORDER — AMOXICILLIN AND CLAVULANATE POTASSIUM 875; 125 MG/1; MG/1
1 TABLET, FILM COATED ORAL 2 TIMES DAILY
Qty: 14 TABLET | Refills: 0 | Status: SHIPPED | OUTPATIENT
Start: 2023-01-10 | End: 2023-01-17

## 2023-01-10 RX ORDER — CETIRIZINE HYDROCHLORIDE 10 MG/1
10 TABLET ORAL DAILY
Qty: 5 TABLET | Refills: 0 | Status: SHIPPED | OUTPATIENT
Start: 2023-01-10 | End: 2023-01-15

## 2023-01-10 RX ORDER — GUAIFENESIN 600 MG/1
600 TABLET, EXTENDED RELEASE ORAL 2 TIMES DAILY
Qty: 10 TABLET | Refills: 0 | Status: SHIPPED | OUTPATIENT
Start: 2023-01-10 | End: 2023-01-15

## 2023-01-10 ASSESSMENT — ENCOUNTER SYMPTOMS
SHORTNESS OF BREATH: 0
ABDOMINAL PAIN: 0
SORE THROAT: 0
COUGH: 1
VOMITING: 0
NAUSEA: 0
BLOOD IN STOOL: 0
SINUS PRESSURE: 1

## 2023-01-10 NOTE — PROGRESS NOTES
Shanel Horowitz (:  1960) is a Established patient, here for evaluation of the following:    Assessment & Plan   Below is the assessment and plan developed based on review of pertinent history, physical exam, labs, studies, and medications. 1. Acute bacterial sinusitis  Uncontrolled  Symptoms present for more than 10 days with no improvement   Prescribed amoxicillin-clavulanate (AUGMENTIN) 875-125 MG per tablet; Take 1 tablet by mouth 2 times daily for 7 days, Disp-14 tablet, R-0Normal  -     cetirizine (ZYRTEC) 10 MG tablet; Take 1 tablet by mouth daily for 5 days, Disp-5 tablet, R-0Normal  2. Acute cough  Uncontrolled  Treating sinusitis as mentioned above  -     guaiFENesin (MUCINEX) 600 MG extended release tablet; Take 1 tablet by mouth 2 times daily for 5 days, Disp-10 tablet, R-0Normal  -     RAPID INFLUENZA A/B ANTIGENS; Future    Return if symptoms worsen or fail to improve. Subjective   Sinus Problem  This is a new problem. The current episode started in the past 7 days. The problem has been gradually worsening since onset. There has been no fever. The pain is moderate. Associated symptoms include congestion, coughing and sinus pressure. Pertinent negatives include no headaches, shortness of breath or sore throat. Review of Systems   Constitutional:  Negative for fatigue and fever. HENT:  Positive for congestion, postnasal drip and sinus pressure. Negative for nosebleeds and sore throat. Respiratory:  Positive for cough. Negative for shortness of breath. Cardiovascular:  Negative for chest pain, palpitations and leg swelling. Gastrointestinal:  Negative for abdominal pain, blood in stool, nausea and vomiting. Neurological:  Negative for dizziness, weakness and headaches.         Objective   Patient-Reported Vitals  No data recorded     Physical Exam  [INSTRUCTIONS:  \"[x]\" Indicates a positive item  \"[]\" Indicates a negative item  -- DELETE ALL ITEMS NOT EXAMINED]    Constitutional: [x] Appears well-developed and well-nourished [x] No apparent distress      [] Abnormal -     Mental status: [x] Alert and awake  [x] Oriented to person/place/time [x] Able to follow commands    [] Abnormal -     Eyes:   EOM    [x]  Normal    [] Abnormal -   Sclera  [x]  Normal    [] Abnormal -          Discharge [x]  None visible   [] Abnormal -     HENT: [x] Normocephalic, atraumatic  [] Abnormal -   [] Mouth/Throat: Mucous membranes are moist    External Ears [x] Normal  [] Abnormal -    Neck: [x] No visualized mass [] Abnormal -     Pulmonary/Chest: [x] Respiratory effort normal   [x] No visualized signs of difficulty breathing or respiratory distress        [] Abnormal -      Musculoskeletal:   [] Normal gait with no signs of ataxia         [x] Normal range of motion of neck        [] Abnormal -     Neurological:        [x] No Facial Asymmetry (Cranial nerve 7 motor function) (limited exam due to video visit)          [x] No gaze palsy        [] Abnormal -          Skin:        [x] No significant exanthematous lesions or discoloration noted on facial skin         [] Abnormal -            Psychiatric:       [x] Normal Affect [] Abnormal -        [x] No Hallucinations    Other pertinent observable physical exam findings:-             Cyrena Pac, was evaluated through a synchronous (real-time) audio-video encounter. The patient (or guardian if applicable) is aware that this is a billable service, which includes applicable co-pays. This Virtual Visit was conducted with patient's (and/or legal guardian's) consent. The visit was conducted pursuant to the emergency declaration under the [address] [place] [address], [address] [place] [address] authority and the [employer] and [employer] General Act. Patient identification was verified, and a caregiver was present when appropriate.    The patient was located at Home: [address] [place] [id number].    Provider was located at Home ([address]): Tucker Frankel, MD

## 2023-01-12 ASSESSMENT — ENCOUNTER SYMPTOMS: SINUS COMPLAINT: 1

## 2023-02-17 ENCOUNTER — HOSPITAL ENCOUNTER (OUTPATIENT)
Age: 63
Setting detail: SPECIMEN
Discharge: HOME | End: 2023-02-17
Payer: COMMERCIAL

## 2023-02-17 DIAGNOSIS — Z01.818 PREOP TESTING: Primary | ICD-10-CM

## 2023-02-17 DIAGNOSIS — Z01.818 PREOP TESTING: ICD-10-CM

## 2023-02-17 LAB
ANION GAP SERPL CALCULATED.3IONS-SCNC: 11 MMOL/L (ref 3–16)
BASOPHILS ABSOLUTE: 0 K/UL (ref 0–0.2)
BASOPHILS RELATIVE PERCENT: 0.8 %
BUN BLDV-MCNC: 20 MG/DL (ref 7–20)
CALCIUM SERPL-MCNC: 9.7 MG/DL (ref 8.3–10.6)
CHLORIDE BLD-SCNC: 100 MMOL/L (ref 99–110)
CO2: 25 MMOL/L (ref 21–32)
CREAT SERPL-MCNC: 0.9 MG/DL (ref 0.6–1.2)
EOSINOPHILS ABSOLUTE: 0.1 K/UL (ref 0–0.6)
EOSINOPHILS RELATIVE PERCENT: 1.8 %
GFR SERPL CREATININE-BSD FRML MDRD: >60 ML/MIN/{1.73_M2}
GLUCOSE BLD-MCNC: 188 MG/DL (ref 70–99)
HCT VFR BLD CALC: 37.6 % (ref 36–48)
HEMOGLOBIN: 12.9 G/DL (ref 12–16)
LYMPHOCYTES ABSOLUTE: 2 K/UL (ref 1–5.1)
LYMPHOCYTES RELATIVE PERCENT: 32.4 %
MCH RBC QN AUTO: 33.3 PG (ref 26–34)
MCHC RBC AUTO-ENTMCNC: 34.3 G/DL (ref 31–36)
MCV RBC AUTO: 97.2 FL (ref 80–100)
MONOCYTES ABSOLUTE: 0.5 K/UL (ref 0–1.3)
MONOCYTES RELATIVE PERCENT: 8 %
NEUTROPHILS ABSOLUTE: 3.6 K/UL (ref 1.7–7.7)
NEUTROPHILS RELATIVE PERCENT: 57 %
PDW BLD-RTO: 13.1 % (ref 12.4–15.4)
PLATELET # BLD: 290 K/UL (ref 135–450)
PMV BLD AUTO: 8.6 FL (ref 5–10.5)
POTASSIUM SERPL-SCNC: 4.3 MMOL/L (ref 3.5–5.1)
RBC # BLD: 3.87 M/UL (ref 4–5.2)
SODIUM BLD-SCNC: 136 MMOL/L (ref 136–145)
WBC # BLD: 6.2 K/UL (ref 4–11)

## 2023-02-17 PROCEDURE — 85025 COMPLETE CBC W/AUTO DIFF WBC: CPT

## 2023-02-17 PROCEDURE — 80048 BASIC METABOLIC PNL TOTAL CA: CPT

## 2023-02-17 PROCEDURE — 36415 COLL VENOUS BLD VENIPUNCTURE: CPT

## 2023-02-24 ENCOUNTER — HOSPITAL ENCOUNTER (OUTPATIENT)
Dept: CARDIAC CATH/INVASIVE PROCEDURES | Age: 63
Discharge: HOME | End: 2023-02-24
Payer: COMMERCIAL

## 2023-02-24 ENCOUNTER — TELEPHONE (OUTPATIENT)
Dept: CARDIOLOGY CLINIC | Age: 63
End: 2023-02-24

## 2023-02-24 VITALS
BODY MASS INDEX: 27.82 KG/M2 | DIASTOLIC BLOOD PRESSURE: 75 MMHG | WEIGHT: 157 LBS | SYSTOLIC BLOOD PRESSURE: 130 MMHG | RESPIRATION RATE: 15 BRPM | HEIGHT: 63 IN | HEART RATE: 78 BPM | TEMPERATURE: 97 F | OXYGEN SATURATION: 99 %

## 2023-02-24 DIAGNOSIS — I10 ESSENTIAL HYPERTENSION: ICD-10-CM

## 2023-02-24 LAB
EKG ATRIAL RATE: 69 BPM
EKG DIAGNOSIS: NORMAL
EKG P AXIS: 47 DEGREES
EKG P-R INTERVAL: 244 MS
EKG Q-T INTERVAL: 420 MS
EKG QRS DURATION: 116 MS
EKG QTC CALCULATION (BAZETT): 450 MS
EKG R AXIS: 2 DEGREES
EKG T AXIS: 57 DEGREES
EKG VENTRICULAR RATE: 69 BPM

## 2023-02-24 PROCEDURE — 2580000003 HC RX 258

## 2023-02-24 PROCEDURE — 2500000003 HC RX 250 WO HCPCS

## 2023-02-24 PROCEDURE — 93005 ELECTROCARDIOGRAM TRACING: CPT | Performed by: STUDENT IN AN ORGANIZED HEALTH CARE EDUCATION/TRAINING PROGRAM

## 2023-02-24 PROCEDURE — 92960 CARDIOVERSION ELECTRIC EXT: CPT

## 2023-02-24 RX ORDER — SODIUM CHLORIDE 0.9 % (FLUSH) 0.9 %
5-40 SYRINGE (ML) INJECTION PRN
Status: DISCONTINUED | OUTPATIENT
Start: 2023-02-24 | End: 2023-02-25 | Stop reason: HOSPADM

## 2023-02-24 RX ORDER — VALSARTAN AND HYDROCHLOROTHIAZIDE 160; 12.5 MG/1; MG/1
TABLET, FILM COATED ORAL
Qty: 90 TABLET | Refills: 0 | Status: SHIPPED | OUTPATIENT
Start: 2023-02-24

## 2023-02-24 RX ORDER — SODIUM CHLORIDE 0.9 % (FLUSH) 0.9 %
5-40 SYRINGE (ML) INJECTION EVERY 12 HOURS SCHEDULED
Status: DISCONTINUED | OUTPATIENT
Start: 2023-02-24 | End: 2023-02-25 | Stop reason: HOSPADM

## 2023-02-24 RX ORDER — SODIUM CHLORIDE 9 MG/ML
INJECTION, SOLUTION INTRAVENOUS PRN
Status: DISCONTINUED | OUTPATIENT
Start: 2023-02-24 | End: 2023-02-25 | Stop reason: HOSPADM

## 2023-02-24 NOTE — PROCEDURES
External Cardioversion     Procedures performed:     IV sedation. External Electrical cardioversion      Indication of the procedure: Atrial fibrillation     Patient atrial fibrillation/flutter       Details of procedure: The patient was brought to the cath lab area in a fasting and non-sedated state. The risks, benefits and alternatives of the procedure were discussed with the patient. The patient opted to proceed with the procedure. Written informed consent was signed and placed in the chart. A timeout protocol was completed to identify the patient and the procedure being performed. Moderate Sedation:  Start time: 0800  Stop time: 0825  0 mg versed   0 mcg fentanyl   An independent trained observed pushed medications at my direction. We monitored the patient's level of consciousness and vital signs/physiologic status throughout the procedure duration (see start and start times above). Then we used brevital for sedation 40mg based on her weight    JOAO was not performed patient consistent with [facility] and confirmed has not missed any doses     Then we used brevital for sedation and electrical DC cardioversion was perfomred using 200J, synchronized shock. Patient was converted to sinus rhythm. The patient tolerated the procedure well and there were no complications. Patient was transported to the holding area in stable condition. Conclusion:     Successful external DC cardioversion of atrial fibrillation. Plan:      The patient can be discharged if remains stable. Will continue with anticoagulation therapy.   Follow up in the office in 2-4 weeks with Dr. Dixon Beckman MD  Interventional Cardiology  2/24/2023  9:05 AM   (O): [phone]

## 2023-02-24 NOTE — DISCHARGE INSTRUCTIONS
CARDIOVERSION      No driving for 24 hours after procedure  Do not make important / legal decisions within 24 hours after procedure  Advance your diet as tolerated  May use 1% hydrocortisone cream or aloe to chest redness if necessary  Continue all of your medication as directed  Continue your blood thinner  Call your doctor for the following symptoms:   -Fever   -Difficulty swallowing   -Chest pain   -Difficulty breathing    Follow up with your doctor

## 2023-02-24 NOTE — TELEPHONE ENCOUNTER
I called and spoke to Pari Gil from [employer].   Brenda's Cardioversion is approved/ approval #[id number]  Call reference #[id number]

## 2023-02-24 NOTE — H&P
H&P Update     The patient is 58 y.o. female with a past medical history significant for DM II, HTN, HLD and persistent atrial fibrillation, left and right atrial flutter. Referral to Dr. Juanjo Davidson 2020 and again 2021. Since that time, on 10/21/2021 s/p AFIB, L flutter and R flutter ablation. Flecainide changed to Rhythmol due to side effects. EKG 22 controlled AFIB followed by s/p successful DCCV 22 persistent L flutter followed by s/p persistent symptomatic atrial flutter DCCV on 3/11/22. EKG 3/24/22 SR. Has not followed up in our clinic since that time. Today, she reports \"I have not been following up with Dr. Juanjo Davidson. \" When told she is in atrial flutter-fibrillation today or since her cardioversion in March. She notes the random, occasional waking up with chest discomfort that is brief and fleeting. Otherwise, the patient denies exertional chest pain/pressure, dyspnea at rest, BORJA, PND, orthopnea, palpitations, heart racing, fluttering, pounding, lightheadedness, dizziness, weight changes, changes in LE edema, near syncope and syncope. She reports she has only been taking her Rythmol BID not every 8 hours as prescribe per her medication list. Otherwise reports compliance and tolerating medical therapy. I have reviewed the history and physical and examined the patient and find no relevant changes. I have reviewed with the patient and/or family the risks, benefits, and alternatives to the procedure. Pre-sedation Assessment    Patient:  Jessica Parra   :   1960    Intended Procedure: DCCV without JOAO    Vitals:    23 0740   BP: 130/75   Pulse: 78   Resp: 15   Temp: 97 °F (36.1 °C)   SpO2: 99%       Nursing notes reviewed and agreed. Medications reviewed  Allergies:    Allergies   Allergen Reactions    Codeine Nausea And Vomiting         Pre-Procedure Assessment/Plan:  ASA 2 - Patient with mild systemic disease with no functional limitations    Mallampati Airway Assessment:  Mallampati Class I - (soft palate, fauces, uvula & anterior/posterior tonsillar pillars are visible)    Level of Sedation Plan:Moderate sedation    Post Procedure plan: Return to same level of care    Naveen Woodall MD  Interventional Cardiology  9:07 AM

## 2023-02-24 NOTE — TELEPHONE ENCOUNTER
Kay,     If Chandler Bowman is not in the office today, are you able to help with pre-cert / prior auth for cardioversion?

## 2023-02-24 NOTE — TELEPHONE ENCOUNTER
[facility] care coordinator with [employer]/ [employer], left message on [employer] voice mail. [employer] handles the healthcare plans with [employer]. Pt had reach out to them wanting to go over the benefits to she what her responsibly would be, pt is scheduled for CV 2/24     Rory Suggs notice there was not a authorization on file, with this being outpatient procedure, it will need a PA on file, please reach out to [employer] to start the PA process.        [employer]  [phone]

## 2023-02-24 NOTE — TELEPHONE ENCOUNTER
Carli,     I don't do anything with preauth's. But Macarena Alanis at [place] should be able too.  I'll loop her in for you and hopefully she can help because Lisbet Elam is off on Friday's.s

## 2023-02-24 NOTE — PRE SEDATION
Sedation Pre-Procedure Note    Patient Name: Rebekah Haley   YOB: 1960  Room/Bed: Room/bed info not found  Medical Record Number: [id number]  Date: 2/24/2023   Time: 9:04 AM       Indication:  atrial fibrillation    Consent: I have discussed with the patient and/or the patient representative the indication, alternatives, and the possible risks and/or complications of the planned procedure and the anesthesia methods. The patient and/or patient representative appear to understand and agree to proceed. Vital Signs:   Vitals:    02/24/23 0740   BP: 130/75   Pulse: 78   Resp: 15   Temp: 97 °F (36.1 °C)   SpO2: 99%       Past Medical History:   has a past medical history of Atrial fibrillation, controlled ([address] Essential hypertension, Melanoma ([facility] [address] Mixed hyperlipidemia, Psoriasis, and Type II or unspecified type diabetes mellitus without mention of complication, not stated as uncontrolled. Past Surgical History:   has a past surgical history that includes Rotator cuff repair (Right, December 2014); Lumbar disc surgery; Breast reduction surgery (2017); joint replacement (Right, 12/28/2020); Colonoscopy (N/A, 10/23/2020); and Cardioversion (N/A, 03/07/2022). Medications:   Scheduled Meds:    sodium chloride flush  5-40 mL IntraVENous 2 times per day    sodium chloride flush  5-40 mL IntraVENous 2 times per day     Continuous Infusions:    sodium chloride      sodium chloride       PRN Meds: sodium chloride flush, sodium chloride, sodium chloride flush, sodium chloride  Home Meds:   Prior to Admission medications    Medication Sig Start Date End Date Taking? Authorizing Provider   rivaroxaban (XARELTO) 20 MG TABS tablet TAKE ONE TABLET BY MOUTH DAILY 12/30/22   Cathie Meier MD   propafenone (RYTHMOL) 150 MG tablet Take 1 tablet by mouth in the morning and 1 tablet at noon and 1 tablet in the evening. TAKE ONE TABLET BY MOUTH EVERY 8 HOURS.  12/2/22   Anila Wall MD   atorvastatin (LIPITOR) 40 MG tablet Take 1 tablet by mouth daily 12/1/22   Casa Lopez MD   metoprolol tartrate (LOPRESSOR) 25 MG tablet Take 1 tablet by mouth 2 times daily 7/8/22 12/2/22  Casa Lopez MD   valsartan-hydroCHLOROthiazide (DIOVAN-HCT) 160-12.5 MG per tablet Take 1 tablet by mouth daily 7/8/22 12/2/22  Casa Lopez MD   glyBURIDE-metFORMIN (GLUCOVANCE) 5-500 MG per tablet Take 1 tablet by mouth twice a day 5/27/22   Tobias Campbell MD   valACYclovir (VALTREX) 500 MG tablet  10/26/20   Historical Provider, MD   dicyclomine (BENTYL) 20 MG tablet Take 20 mg by mouth 4 times daily as needed    Historical Provider, MD   Apremilast 30 MG TABS Take 30 mg by mouth daily  4/19/18   Historical Provider, MD     Coumadin Use Last 7 Days:  no  Antiplatelet drug therapy use last 7 days: no  Other anticoagulant use last 7 days: yes - asa  Additional Medication Information:  as above      Pre-Sedation Documentation and Exam:   I have personally completed a history, physical exam & review of systems for this patient (see notes).     Mallampati Airway Assessment:  Mallampati Class I - (soft palate, fauces, uvula & anterior/posterior tonsillar pillars are visible)    Prior History of Anesthesia Complications:   none    ASA Classification:  Class 2 - A normal healthy patient with mild systemic disease    Sedation/ Anesthesia Plan:   intravenous sedation    Medications Planned:   midazolam (Versed) intravenously and fentanyl intravenously    Patient is an appropriate candidate for plan of sedation: yes    Electronically signed by Jimmie Lomax MD on 2/24/2023 at 9:04 AM

## 2023-03-14 DIAGNOSIS — I10 ESSENTIAL HYPERTENSION: ICD-10-CM

## 2023-03-14 DIAGNOSIS — E11.9 TYPE 2 DIABETES MELLITUS WITHOUT COMPLICATION, WITHOUT LONG-TERM CURRENT USE OF INSULIN (HCC): ICD-10-CM

## 2023-03-15 RX ORDER — GLYBURIDE-METFORMIN HYDROCHLORIDE 5; 500 MG/1; MG/1
TABLET ORAL
Qty: 180 TABLET | Refills: 1 | Status: SHIPPED | OUTPATIENT
Start: 2023-03-15

## 2023-04-20 ENCOUNTER — TELEPHONE (OUTPATIENT)
Dept: INTERNAL MEDICINE CLINIC | Age: 63
End: 2023-04-20

## 2023-04-20 NOTE — TELEPHONE ENCOUNTER
Pt is out of Otezela 30 mg, 1 po bid. Her Dermatologist won't prescribe it until she has her  appt  in August.   Pt asked if Dr. Clint Lai will send in an Rx/    to [address] [place] [address].   Fax [phone] [address] Member # [id number]

## 2023-05-30 DIAGNOSIS — I10 ESSENTIAL HYPERTENSION: ICD-10-CM

## 2023-05-30 RX ORDER — VALSARTAN AND HYDROCHLOROTHIAZIDE 160; 12.5 MG/1; MG/1
1 TABLET, FILM COATED ORAL DAILY
Qty: 90 TABLET | Refills: 1 | Status: SHIPPED | OUTPATIENT
Start: 2023-05-30

## 2023-06-05 RX ORDER — APREMILAST 30 MG/1
TABLET, FILM COATED ORAL
Qty: 60 TABLET | Refills: 0 | Status: SHIPPED | OUTPATIENT
Start: 2023-06-05

## 2023-06-09 DIAGNOSIS — E78.2 MIXED HYPERLIPIDEMIA: ICD-10-CM

## 2023-06-11 RX ORDER — ATORVASTATIN CALCIUM 40 MG/1
TABLET, FILM COATED ORAL
Qty: 90 TABLET | Refills: 1 | Status: SHIPPED | OUTPATIENT
Start: 2023-06-11

## 2023-07-17 ENCOUNTER — TELEPHONE (OUTPATIENT)
Dept: INTERNAL MEDICINE CLINIC | Age: 63
End: 2023-07-17

## 2023-07-17 RX ORDER — APREMILAST 30 MG/1
TABLET, FILM COATED ORAL
Qty: 60 TABLET | Refills: 0 | Status: SHIPPED | OUTPATIENT
Start: 2023-07-17

## 2023-07-17 NOTE — TELEPHONE ENCOUNTER
Please advise patient to go to urgent care or emergency for evaluation and treatment as soon as possible.

## 2023-07-17 NOTE — TELEPHONE ENCOUNTER
Pt just returned from [place] yesterday. She is extremely stressed, having headaches, not eating because she is nauseous and stomach pain. SHe started having Diarrhea for a couple of days. She wants to know what her doctor advises.

## 2023-08-22 ENCOUNTER — OFFICE VISIT (OUTPATIENT)
Dept: CARDIOLOGY CLINIC | Age: 63
End: 2023-08-22
Payer: COMMERCIAL

## 2023-08-22 VITALS
BODY MASS INDEX: 26.22 KG/M2 | SYSTOLIC BLOOD PRESSURE: 118 MMHG | DIASTOLIC BLOOD PRESSURE: 72 MMHG | HEIGHT: 63 IN | OXYGEN SATURATION: 98 % | WEIGHT: 148 LBS | HEART RATE: 78 BPM

## 2023-08-22 DIAGNOSIS — I48.19 PERSISTENT ATRIAL FIBRILLATION (HCC): Primary | ICD-10-CM

## 2023-08-22 DIAGNOSIS — E78.2 MIXED HYPERLIPIDEMIA: ICD-10-CM

## 2023-08-22 DIAGNOSIS — I48.92 RIGHT ATRIAL FLUTTER BY ELECTROCARDIOGRAM (HCC): ICD-10-CM

## 2023-08-22 DIAGNOSIS — I10 ESSENTIAL HYPERTENSION: ICD-10-CM

## 2023-08-22 DIAGNOSIS — I48.92 LEFT ATRIAL FLUTTER BY ELECTROCARDIOGRAM (HCC): ICD-10-CM

## 2023-08-22 PROCEDURE — 99214 OFFICE O/P EST MOD 30 MIN: CPT | Performed by: INTERNAL MEDICINE

## 2023-08-22 PROCEDURE — 93000 ELECTROCARDIOGRAM COMPLETE: CPT | Performed by: INTERNAL MEDICINE

## 2023-08-22 PROCEDURE — 3074F SYST BP LT 130 MM HG: CPT | Performed by: INTERNAL MEDICINE

## 2023-08-22 PROCEDURE — 3078F DIAST BP <80 MM HG: CPT | Performed by: INTERNAL MEDICINE

## 2023-08-22 NOTE — PROGRESS NOTES
hypertension 11/1/2011    Melanoma ([address])     Mixed hyperlipidemia 11/1/2011    Psoriasis     Type II or unspecified type diabetes mellitus without mention of complication, not stated as uncontrolled      Past Surgical History:   Procedure Laterality Date    BREAST REDUCTION SURGERY  2017    CARDIOVERSION N/A 03/07/2022    COLONOSCOPY N/A 10/23/2020    COLONOSCOPY WITH BIOPSY performed by Suzan Howell MD at [facility] Right 12/28/2020    LUMBAR [address] [place] Right December 2014    Dr. Chad Mcclendon at [facility]     Family History   Problem Relation Age of Onset    Other Mother     Heart Disease Mother     Heart Disease Father     Atrial Fibrillation Father     Cancer Sister      Social History     Tobacco Use    Smoking status: Never    Smokeless tobacco: Never   Vaping Use    Vaping Use: Never used   Substance Use Topics    Alcohol use: Yes     Comment: rare    Drug use: No       Allergies   Allergen Reactions    Codeine Nausea And Vomiting     Current Outpatient Medications   Medication Sig Dispense Refill    Aspirin 81 MG CAPS Take by mouth      Multiple Vitamin (MULTIVITAMIN PO) Take by mouth      OTEZLA 30 MG TABS TAKE 1 TABLET BY MOUTH DAILY 60 tablet 0    metoprolol tartrate (LOPRESSOR) 25 MG tablet Take 1 tablet by mouth 2 times daily 180 tablet 1    propafenone (RYTHMOL) 150 MG tablet Take 1 tablet by mouth in the morning and 1 tablet at noon and 1 tablet in the evening.  TAKE ONE TABLET BY MOUTH EVERY 8 HOURS. 270 tablet 3    glyBURIDE-metFORMIN (GLUCOVANCE) 5-500 MG per tablet TAKE ONE TABLET BY MOUTH TWICE A  tablet 1    atorvastatin (LIPITOR) 40 MG tablet TAKE ONE TABLET BY MOUTH DAILY 90 tablet 1    valsartan-hydroCHLOROthiazide (DIOVAN-HCT) 160-12.5 MG per tablet Take 1 tablet by mouth daily 90 tablet 1    rivaroxaban (XARELTO) 20 MG TABS tablet TAKE ONE TABLET BY MOUTH DAILY 90 tablet 3    valACYclovir (VALTREX) 500 MG tablet

## 2023-09-06 ENCOUNTER — OFFICE VISIT (OUTPATIENT)
Dept: CARDIOLOGY CLINIC | Age: 63
End: 2023-09-06
Payer: COMMERCIAL

## 2023-09-06 VITALS
BODY MASS INDEX: 26.61 KG/M2 | OXYGEN SATURATION: 97 % | HEIGHT: 63 IN | HEART RATE: 92 BPM | DIASTOLIC BLOOD PRESSURE: 72 MMHG | SYSTOLIC BLOOD PRESSURE: 110 MMHG | WEIGHT: 150.2 LBS

## 2023-09-06 DIAGNOSIS — I48.4 ATYPICAL ATRIAL FLUTTER (HCC): ICD-10-CM

## 2023-09-06 DIAGNOSIS — I48.19 PERSISTENT ATRIAL FIBRILLATION (HCC): Primary | ICD-10-CM

## 2023-09-06 DIAGNOSIS — I48.3 TYPICAL ATRIAL FLUTTER (HCC): ICD-10-CM

## 2023-09-06 PROCEDURE — 93000 ELECTROCARDIOGRAM COMPLETE: CPT | Performed by: INTERNAL MEDICINE

## 2023-09-06 PROCEDURE — 99215 OFFICE O/P EST HI 40 MIN: CPT | Performed by: INTERNAL MEDICINE

## 2023-09-06 PROCEDURE — 3074F SYST BP LT 130 MM HG: CPT | Performed by: INTERNAL MEDICINE

## 2023-09-06 PROCEDURE — 3078F DIAST BP <80 MM HG: CPT | Performed by: INTERNAL MEDICINE

## 2023-09-06 NOTE — PROGRESS NOTES
atrial flutter. I had a very extensive discussion about treatment for atypical and typical flutter. Unfortunately medication in this scenario are usually suboptimal in managing the arrhythmia. She clearly would need extensive repeat ablation and if there is considerable scar in the posterior wall would also consider a convergent procedure. There is no point in repeating a cardioversion. After the ablation procedure, I will change the medication to amiodarone which she will take for 3 months then stop followed by a 30-day event monitor. The risks and benefits of the procedure were discussed in great detail and the patient chooses to proceed with ablation for both typical and atypical flutter. JOAO immediately before the procedure. The risks, benefits and alternatives of [x]atrial fibrillation [x]atrial flutter ablation procedure were discussed with the patient. The risks including, but not limited to, bleeding, infection, radiation exposure, injury to vascular, cardiac and surrounding structures (including pneumothorax), stroke, cardiac perforation, tamponade, need for emergent open heart surgery, need for pacemaker implantation, injury to the phrenic nerve, injury to the esophagus, myocardial infarction and death were discussed in detail. The patient was given the option of postponing their procedure. The patient was also presented reasonable alternatives to the proposed care, treatment, and services. The discussion I have had with the patient encompassed risks, benefits, and side effects related to the alternatives and the risks related to not receiving the proposed care, treatment and services. Type 2 diabetes mellitus  -Continue current medical management.  -Hb A1c 6.4 (04/12/2023)    Essential hypertension  - Controlled. - Goal BP <130/80.   - Continue medical therapy. Follow ups:   Follow up one month after the procedure with myself and three months after procedure with Harper Méndez CNP

## 2023-09-06 NOTE — PATIENT INSTRUCTIONS
If you have any question regarding your ablation please contact Dr. Shannon Adhikari Nurse at [phone]. Our  Martha Rapp will call to schedule your procedure. Atrial fibrillation and Typical Atrial Flutter Ablation Pre procedure Instructions    Date:______________________________    Arrive at:__________________________    Procedure time:____________________    The morning of your procedure you will park in the hospital parking lot and report directly to the cath lab to check in. At the information desk stay right and go all the way to the end of the tao, this will take you directly to your check in desk for the cath lab. Pre-Procedure Instructions   You will need to fast (nothing to eat or drink) after midnight the day of your procedure. Do NOT chew gum or eat mints the day of your procedure  You will need to hold your Xarelto the morning of the procedure. You will need to hold Aspirin for 7 days prior to the procedure. You will need to hold your valsartan-hydroCHLOROthiazide the morning of the procedure. You will need to hold all diabetic medications including, glyBURIDE-metFORMIN (GLUCOVANCE)   the morning of the procedure. You will need to hold your propafenone and metoprolol the morning of the procedure. Do not use any lotions, creams or perfume the morning of procedure. You will need to complete pre-procedure lab work 5-7 days prior to your procedure. Please have a responsible adult to drive you home after procedure, you should go home same day, but there is always a possibility of an overnight stay. Cath lab will provide you with all post procedure instructions  Follow up one month with Dr. Fam Tanner after the procedure and three months with Elba Urrutia CNP after procedure                                          [facility]  [address] [place] [address],[address], [facility]  Phone: [phone]  The hours are Mon -Fri.   6:30 am -

## 2023-09-13 ENCOUNTER — TELEPHONE (OUTPATIENT)
Dept: CARDIOLOGY CLINIC | Age: 63
End: 2023-09-13

## 2023-09-13 DIAGNOSIS — I48.3 TYPICAL ATRIAL FLUTTER (HCC): ICD-10-CM

## 2023-09-13 DIAGNOSIS — I48.19 PERSISTENT ATRIAL FIBRILLATION (HCC): Primary | ICD-10-CM

## 2023-09-13 NOTE — TELEPHONE ENCOUNTER
Please reach out to patient and schedule JOAO atrial fibrillation and typical atrial flutter ablation with Dr. Talisha Ren at [facility]. Anesthesia is needed   Echo tech is needed for JOAO. Include Carto Rep in email [email or web address]      Atrial fibrillation and Typical Atrial Flutter Ablation Pre procedure Instructions     Date:______________________________     Arrive at:__________________________     Procedure time:____________________     The morning of your procedure you will park in the hospital parking lot and report directly to the cath lab to check in. At the information desk stay right and go all the way to the end of the tao, this will take you directly to your check in desk for the cath lab. Pre-Procedure Instructions   You will need to fast (nothing to eat or drink) after midnight the day of your procedure. Do NOT chew gum or eat mints the day of your procedure  You will need to hold your Xarelto the morning of the procedure. You will need to hold Aspirin for 7 days prior to the procedure. You will need to hold your valsartan-hydroCHLOROthiazide the morning of the procedure. You will need to hold all diabetic medications including, glyBURIDE-metFORMIN (GLUCOVANCE)   the morning of the procedure. You will need to hold your propafenone and metoprolol the morning of the procedure. Do not use any lotions, creams or perfume the morning of procedure. Please have a responsible adult to drive you home after procedure, you should go home same day, but there is always a possibility of an overnight stay. Cath lab will provide you with all post procedure instructions  Follow up one month with Dr. Talisha Ren after the procedure and three months with Toshia Turner CNP after procedure                                             [facility]  [address] [place] [address]   [facility]  Phone: [phone]  The hours are Mon -Fri.   6:30

## 2023-09-13 NOTE — TELEPHONE ENCOUNTER
Patient called the office wanting to schedule an ablation with Dr. Matilde Rawls.      Brenda's callback: [phone]

## 2023-09-14 ENCOUNTER — OFFICE VISIT (OUTPATIENT)
Dept: INTERNAL MEDICINE CLINIC | Age: 63
End: 2023-09-14
Payer: COMMERCIAL

## 2023-09-14 ENCOUNTER — PROCEDURE VISIT (OUTPATIENT)
Dept: CARDIOLOGY CLINIC | Age: 63
End: 2023-09-14

## 2023-09-14 VITALS
BODY MASS INDEX: 26.24 KG/M2 | TEMPERATURE: 97.3 F | DIASTOLIC BLOOD PRESSURE: 64 MMHG | WEIGHT: 148.13 LBS | SYSTOLIC BLOOD PRESSURE: 114 MMHG | HEART RATE: 82 BPM | OXYGEN SATURATION: 97 %

## 2023-09-14 DIAGNOSIS — I48.19 PERSISTENT ATRIAL FIBRILLATION (HCC): ICD-10-CM

## 2023-09-14 DIAGNOSIS — R05.1 ACUTE COUGH: ICD-10-CM

## 2023-09-14 DIAGNOSIS — J01.00 ACUTE MAXILLARY SINUSITIS, RECURRENCE NOT SPECIFIED: Primary | ICD-10-CM

## 2023-09-14 PROCEDURE — 3074F SYST BP LT 130 MM HG: CPT | Performed by: NURSE PRACTITIONER

## 2023-09-14 PROCEDURE — 99213 OFFICE O/P EST LOW 20 MIN: CPT | Performed by: NURSE PRACTITIONER

## 2023-09-14 PROCEDURE — 3078F DIAST BP <80 MM HG: CPT | Performed by: NURSE PRACTITIONER

## 2023-09-14 RX ORDER — FLUTICASONE PROPIONATE 50 MCG
1 SPRAY, SUSPENSION (ML) NASAL DAILY
Qty: 16 G | Refills: 0 | Status: SHIPPED | OUTPATIENT
Start: 2023-09-14

## 2023-09-14 RX ORDER — AMOXICILLIN AND CLAVULANATE POTASSIUM 875; 125 MG/1; MG/1
1 TABLET, FILM COATED ORAL 2 TIMES DAILY
Qty: 20 TABLET | Refills: 0 | Status: SHIPPED | OUTPATIENT
Start: 2023-09-14 | End: 2023-09-24

## 2023-09-14 RX ORDER — BENZONATATE 100 MG/1
100 CAPSULE ORAL 3 TIMES DAILY PRN
Qty: 30 CAPSULE | Refills: 0 | Status: SHIPPED | OUTPATIENT
Start: 2023-09-14 | End: 2023-09-24

## 2023-09-14 ASSESSMENT — ENCOUNTER SYMPTOMS
RHINORRHEA: 1
SORE THROAT: 0
VOMITING: 0
NAUSEA: 0
SINUS PAIN: 1
COUGH: 1
SINUS PRESSURE: 1
DIARRHEA: 0

## 2023-09-14 NOTE — PROGRESS NOTES
Date: 9/14/2023                                               Subjective/Objective:     Chief Complaint   Patient presents with    Cough     Worse at night,  has had for over a week now    Head Congestion       HPI    Hector Mayfield is a 59 yo female, visit today for cough and head congestion. Symptoms began over one week ago and have been not worsening nor improving since that time. She has associated post nasal drip, sinus pressure and pain. She denies associated fever/chills, N/V/D. Has taken OTC medications - nyquil - with some relief.  is also sick with similar symptoms.          Patient Active Problem List    Diagnosis Date Noted    Left atrial flutter by electrocardiogram ([address]) 10/21/2021    Right atrial flutter by electrocardiogram ([address]) 10/21/2021    Left-sided chest pain 06/11/2021    Irritable bowel syndrome with diarrhea 11/05/2020    Status post Mohs surgery for basal cell carcinoma 08/23/2018    Macromastia 12/19/2017    Psoriasis vulgaris 08/22/2016    Genital herpes simplex 05/04/2016    Persistent atrial fibrillation ([address]) 03/27/2013    Type 2 diabetes mellitus without complication ([address]) 47/12/2819    Mixed hyperlipidemia 11/01/2011    Essential hypertension 11/01/2011    Encounter for long-term (current) use of other medications 03/04/2010    Abnormal levels of other serum enzymes 08/28/2009    Pure hypercholesterolemia 01/30/2009       Past Medical History:   Diagnosis Date    Atrial fibrillation, controlled ([address]) 3/27/2013    Essential hypertension 11/1/2011    Melanoma ([address])     Mixed hyperlipidemia 11/1/2011    Psoriasis     Type II or unspecified type diabetes mellitus without mention of complication, not stated as uncontrolled        Past Surgical History:   Procedure Laterality Date    BREAST REDUCTION SURGERY  2017    CARDIOVERSION N/A 03/07/2022    COLONOSCOPY N/A 10/23/2020    COLONOSCOPY WITH BIOPSY performed by Juan David Blair MD at [facility]

## 2023-09-15 NOTE — TELEPHONE ENCOUNTER
Spoke with the patient and got her scheduled for procedure. We went over instructions below and she verbalized understanding     Atrial fibrillation and Typical Atrial Flutter Ablation Pre procedure Instructions     Date: 10/5/2023     Arrive at: 9:30 am      Procedure time: 10:30 am      The morning of your procedure you will park in the hospital parking lot and report directly to the cath lab to check in. At the information desk stay right and go all the way to the end of the tao, this will take you directly to your check in desk for the cath lab. Pre-Procedure Instructions   You will need to fast (nothing to eat or drink) after midnight the day of your procedure. Do NOT chew gum or eat mints the day of your procedure  You will need to hold your Xarelto the morning of the procedure. You will need to hold Aspirin for 7 days prior to the procedure. You will need to hold your valsartan-hydroCHLOROthiazide the morning of the procedure. You will need to hold all diabetic medications including, glyBURIDE-metFORMIN (GLUCOVANCE)   the morning of the procedure. You will need to hold your propafenone and metoprolol the morning of the procedure. Do not use any lotions, creams or perfume the morning of procedure. Please have a responsible adult to drive you home after procedure, you should go home same day, but there is always a possibility of an overnight stay. Cath lab will provide you with all post procedure instructions  Follow up one month with Dr. Roni Singh after the procedure and three months with Juan Earl CNP after procedure                                             [facility]  [address] [place] [address]   [facility], [address] [place] [address]  Phone: [phone]  The hours are Mon -Fri.   6:30 am - 4:00 pm   Saturday 8:00 am - noon  No appointment necessary           Qgenda update / Added in [facility] / emailed cath lab

## 2023-09-30 ENCOUNTER — HOSPITAL ENCOUNTER (OUTPATIENT)
Age: 63
Discharge: HOME | End: 2023-09-30
Payer: COMMERCIAL

## 2023-09-30 DIAGNOSIS — I48.19 PERSISTENT ATRIAL FIBRILLATION (HCC): ICD-10-CM

## 2023-09-30 DIAGNOSIS — I48.3 TYPICAL ATRIAL FLUTTER (HCC): ICD-10-CM

## 2023-09-30 LAB
ABO + RH BLD: NORMAL
ALBUMIN SERPL-MCNC: 4.3 G/DL (ref 3.4–5)
ALP SERPL-CCNC: 84 U/L (ref 40–129)
ALT SERPL-CCNC: 27 U/L (ref 10–40)
ANION GAP SERPL CALCULATED.3IONS-SCNC: 11 MMOL/L (ref 3–16)
AST SERPL-CCNC: 14 U/L (ref 15–37)
BILIRUB DIRECT SERPL-MCNC: <0.2 MG/DL (ref 0–0.3)
BILIRUB INDIRECT SERPL-MCNC: ABNORMAL MG/DL (ref 0–1)
BILIRUB SERPL-MCNC: 0.5 MG/DL (ref 0–1)
BLD GP AB SCN SERPL QL: NORMAL
BUN SERPL-MCNC: 19 MG/DL (ref 7–20)
CALCIUM SERPL-MCNC: 8.9 MG/DL (ref 8.3–10.6)
CHLORIDE SERPL-SCNC: 107 MMOL/L (ref 99–110)
CO2 SERPL-SCNC: 22 MMOL/L (ref 21–32)
CREAT SERPL-MCNC: 1.1 MG/DL (ref 0.6–1.2)
DEPRECATED RDW RBC AUTO: 13.6 % (ref 12.4–15.4)
GFR SERPLBLD CREATININE-BSD FMLA CKD-EPI: 56 ML/MIN/{1.73_M2}
GLUCOSE SERPL-MCNC: 230 MG/DL (ref 70–99)
HCT VFR BLD AUTO: 35.6 % (ref 36–48)
HGB BLD-MCNC: 12.3 G/DL (ref 12–16)
MCH RBC QN AUTO: 33.2 PG (ref 26–34)
MCHC RBC AUTO-ENTMCNC: 34.5 G/DL (ref 31–36)
MCV RBC AUTO: 96.2 FL (ref 80–100)
PLATELET # BLD AUTO: 259 K/UL (ref 135–450)
PMV BLD AUTO: 9.4 FL (ref 5–10.5)
POTASSIUM SERPL-SCNC: 4.2 MMOL/L (ref 3.5–5.1)
PROT SERPL-MCNC: 6.7 G/DL (ref 6.4–8.2)
RBC # BLD AUTO: 3.7 M/UL (ref 4–5.2)
SODIUM SERPL-SCNC: 140 MMOL/L (ref 136–145)
TSH SERPL DL<=0.005 MIU/L-ACNC: 0.63 UIU/ML (ref 0.27–4.2)
WBC # BLD AUTO: 6 K/UL (ref 4–11)

## 2023-09-30 PROCEDURE — 36415 COLL VENOUS BLD VENIPUNCTURE: CPT

## 2023-09-30 PROCEDURE — 85027 COMPLETE CBC AUTOMATED: CPT

## 2023-09-30 PROCEDURE — 86901 BLOOD TYPING SEROLOGIC RH(D): CPT

## 2023-09-30 PROCEDURE — 80048 BASIC METABOLIC PNL TOTAL CA: CPT

## 2023-09-30 PROCEDURE — 80076 HEPATIC FUNCTION PANEL: CPT

## 2023-09-30 PROCEDURE — 86850 RBC ANTIBODY SCREEN: CPT

## 2023-09-30 PROCEDURE — 86900 BLOOD TYPING SEROLOGIC ABO: CPT

## 2023-09-30 PROCEDURE — 84443 ASSAY THYROID STIM HORMONE: CPT

## 2023-10-05 ENCOUNTER — HOSPITAL ENCOUNTER (OUTPATIENT)
Dept: CARDIAC CATH/INVASIVE PROCEDURES | Age: 63
Discharge: HOME | End: 2023-10-05
Attending: INTERNAL MEDICINE | Admitting: INTERNAL MEDICINE
Payer: COMMERCIAL

## 2023-10-05 ENCOUNTER — ANESTHESIA EVENT (OUTPATIENT)
Dept: CARDIAC CATH/INVASIVE PROCEDURES | Age: 63
End: 2023-10-05
Payer: COMMERCIAL

## 2023-10-05 ENCOUNTER — ANESTHESIA (OUTPATIENT)
Dept: CARDIAC CATH/INVASIVE PROCEDURES | Age: 63
End: 2023-10-05
Payer: COMMERCIAL

## 2023-10-05 VITALS
BODY MASS INDEX: 26.58 KG/M2 | OXYGEN SATURATION: 98 % | DIASTOLIC BLOOD PRESSURE: 79 MMHG | HEIGHT: 63 IN | SYSTOLIC BLOOD PRESSURE: 114 MMHG | RESPIRATION RATE: 13 BRPM | TEMPERATURE: 97.3 F | HEART RATE: 57 BPM | WEIGHT: 150 LBS

## 2023-10-05 LAB
ABO + RH BLD: NORMAL
ANION GAP SERPL CALCULATED.3IONS-SCNC: 11 MMOL/L (ref 3–16)
BLD GP AB SCN SERPL QL: NORMAL
BUN SERPL-MCNC: 17 MG/DL (ref 7–20)
CALCIUM SERPL-MCNC: 9.9 MG/DL (ref 8.3–10.6)
CHLORIDE SERPL-SCNC: 100 MMOL/L (ref 99–110)
CO2 SERPL-SCNC: 24 MMOL/L (ref 21–32)
CREAT SERPL-MCNC: 0.9 MG/DL (ref 0.6–1.2)
DEPRECATED RDW RBC AUTO: 14 % (ref 12.4–15.4)
EKG ATRIAL RATE: 127 BPM
EKG ATRIAL RATE: 58 BPM
EKG DIAGNOSIS: NORMAL
EKG DIAGNOSIS: NORMAL
EKG P AXIS: 92 DEGREES
EKG P-R INTERVAL: 202 MS
EKG Q-T INTERVAL: 410 MS
EKG Q-T INTERVAL: 486 MS
EKG QRS DURATION: 104 MS
EKG QRS DURATION: 98 MS
EKG QTC CALCULATION (BAZETT): 477 MS
EKG QTC CALCULATION (BAZETT): 501 MS
EKG R AXIS: 11 DEGREES
EKG R AXIS: 15 DEGREES
EKG T AXIS: 109 DEGREES
EKG T AXIS: 81 DEGREES
EKG VENTRICULAR RATE: 58 BPM
EKG VENTRICULAR RATE: 90 BPM
GFR SERPLBLD CREATININE-BSD FMLA CKD-EPI: >60 ML/MIN/{1.73_M2}
GLUCOSE BLD-MCNC: 164 MG/DL (ref 70–99)
GLUCOSE SERPL-MCNC: 202 MG/DL (ref 70–99)
HCT VFR BLD AUTO: 38.1 % (ref 36–48)
HGB BLD-MCNC: 12.8 G/DL (ref 12–16)
MCH RBC QN AUTO: 32.6 PG (ref 26–34)
MCHC RBC AUTO-ENTMCNC: 33.7 G/DL (ref 31–36)
MCV RBC AUTO: 96.9 FL (ref 80–100)
PERFORMED ON: ABNORMAL
PLATELET # BLD AUTO: 296 K/UL (ref 135–450)
PMV BLD AUTO: 8.5 FL (ref 5–10.5)
POC ACT LR: 283 SEC
POC ACT LR: 291 SEC
POC ACT LR: 291 SEC
POC ACT LR: 322 SEC
POC ACT LR: 381 SEC
POC ACT LR: 386 SEC
POTASSIUM SERPL-SCNC: 4.7 MMOL/L (ref 3.5–5.1)
RBC # BLD AUTO: 3.93 M/UL (ref 4–5.2)
SODIUM SERPL-SCNC: 135 MMOL/L (ref 136–145)
WBC # BLD AUTO: 7.3 K/UL (ref 4–11)

## 2023-10-05 PROCEDURE — 93656 COMPRE EP EVAL ABLTJ ATR FIB: CPT

## 2023-10-05 PROCEDURE — 2500000003 HC RX 250 WO HCPCS: Performed by: NURSE ANESTHETIST, CERTIFIED REGISTERED

## 2023-10-05 PROCEDURE — C1732 CATH, EP, DIAG/ABL, 3D/VECT: HCPCS

## 2023-10-05 PROCEDURE — 6360000002 HC RX W HCPCS: Performed by: NURSE ANESTHETIST, CERTIFIED REGISTERED

## 2023-10-05 PROCEDURE — 93623 PRGRMD STIMJ&PACG IV RX NFS: CPT

## 2023-10-05 PROCEDURE — 7100000010 HC PHASE II RECOVERY - FIRST 15 MIN: Performed by: ANESTHESIOLOGY

## 2023-10-05 PROCEDURE — 86901 BLOOD TYPING SEROLOGIC RH(D): CPT

## 2023-10-05 PROCEDURE — C1730 CATH, EP, 19 OR FEW ELECT: HCPCS

## 2023-10-05 PROCEDURE — 93655 ICAR CATH ABLTJ DSCRT ARRHYT: CPT

## 2023-10-05 PROCEDURE — C1760 CLOSURE DEV, VASC: HCPCS

## 2023-10-05 PROCEDURE — 6360000002 HC RX W HCPCS

## 2023-10-05 PROCEDURE — 93005 ELECTROCARDIOGRAM TRACING: CPT | Performed by: INTERNAL MEDICINE

## 2023-10-05 PROCEDURE — C1769 GUIDE WIRE: HCPCS

## 2023-10-05 PROCEDURE — 85347 COAGULATION TIME ACTIVATED: CPT

## 2023-10-05 PROCEDURE — 2500000003 HC RX 250 WO HCPCS

## 2023-10-05 PROCEDURE — 93657 TX L/R ATRIAL FIB ADDL: CPT | Performed by: INTERNAL MEDICINE

## 2023-10-05 PROCEDURE — 36415 COLL VENOUS BLD VENIPUNCTURE: CPT

## 2023-10-05 PROCEDURE — C1894 INTRO/SHEATH, NON-LASER: HCPCS

## 2023-10-05 PROCEDURE — 2580000003 HC RX 258

## 2023-10-05 PROCEDURE — 86900 BLOOD TYPING SEROLOGIC ABO: CPT

## 2023-10-05 PROCEDURE — 93312 ECHO TRANSESOPHAGEAL: CPT

## 2023-10-05 PROCEDURE — 7100000011 HC PHASE II RECOVERY - ADDTL 15 MIN: Performed by: ANESTHESIOLOGY

## 2023-10-05 PROCEDURE — C1766 INTRO/SHEATH,STRBLE,NON-PEEL: HCPCS

## 2023-10-05 PROCEDURE — 7100000000 HC PACU RECOVERY - FIRST 15 MIN: Performed by: ANESTHESIOLOGY

## 2023-10-05 PROCEDURE — C1759 CATH, INTRA ECHOCARDIOGRAPHY: HCPCS

## 2023-10-05 PROCEDURE — 93325 DOPPLER ECHO COLOR FLOW MAPG: CPT

## 2023-10-05 PROCEDURE — 93623 PRGRMD STIMJ&PACG IV RX NFS: CPT | Performed by: INTERNAL MEDICINE

## 2023-10-05 PROCEDURE — 93010 ELECTROCARDIOGRAM REPORT: CPT | Performed by: INTERNAL MEDICINE

## 2023-10-05 PROCEDURE — 3700000000 HC ANESTHESIA ATTENDED CARE: Performed by: ANESTHESIOLOGY

## 2023-10-05 PROCEDURE — 86850 RBC ANTIBODY SCREEN: CPT

## 2023-10-05 PROCEDURE — 7100000001 HC PACU RECOVERY - ADDTL 15 MIN: Performed by: ANESTHESIOLOGY

## 2023-10-05 PROCEDURE — 2580000003 HC RX 258: Performed by: NURSE ANESTHETIST, CERTIFIED REGISTERED

## 2023-10-05 PROCEDURE — 93657 TX L/R ATRIAL FIB ADDL: CPT

## 2023-10-05 PROCEDURE — 93655 ICAR CATH ABLTJ DSCRT ARRHYT: CPT | Performed by: INTERNAL MEDICINE

## 2023-10-05 PROCEDURE — 93656 COMPRE EP EVAL ABLTJ ATR FIB: CPT | Performed by: INTERNAL MEDICINE

## 2023-10-05 PROCEDURE — 85027 COMPLETE CBC AUTOMATED: CPT

## 2023-10-05 PROCEDURE — 93320 DOPPLER ECHO COMPLETE: CPT

## 2023-10-05 PROCEDURE — 3700000001 HC ADD 15 MINUTES (ANESTHESIA): Performed by: ANESTHESIOLOGY

## 2023-10-05 PROCEDURE — 80048 BASIC METABOLIC PNL TOTAL CA: CPT

## 2023-10-05 RX ORDER — SUCCINYLCHOLINE/SOD CL,ISO/PF 200MG/10ML
SYRINGE (ML) INTRAVENOUS PRN
Status: DISCONTINUED | OUTPATIENT
Start: 2023-10-05 | End: 2023-10-05 | Stop reason: SDUPTHER

## 2023-10-05 RX ORDER — AMIODARONE HYDROCHLORIDE 200 MG/1
200 TABLET ORAL 2 TIMES DAILY
Qty: 90 TABLET | Refills: 0 | Status: SHIPPED | OUTPATIENT
Start: 2023-10-05 | End: 2023-10-14

## 2023-10-05 RX ORDER — SODIUM CHLORIDE 0.9 % (FLUSH) 0.9 %
5-40 SYRINGE (ML) INJECTION EVERY 12 HOURS SCHEDULED
Status: DISCONTINUED | OUTPATIENT
Start: 2023-10-05 | End: 2023-10-05 | Stop reason: HOSPADM

## 2023-10-05 RX ORDER — SODIUM CHLORIDE 0.9 % (FLUSH) 0.9 %
5-40 SYRINGE (ML) INJECTION PRN
Status: DISCONTINUED | OUTPATIENT
Start: 2023-10-05 | End: 2023-10-05 | Stop reason: HOSPADM

## 2023-10-05 RX ORDER — PROPOFOL 10 MG/ML
INJECTION, EMULSION INTRAVENOUS PRN
Status: DISCONTINUED | OUTPATIENT
Start: 2023-10-05 | End: 2023-10-05 | Stop reason: SDUPTHER

## 2023-10-05 RX ORDER — SODIUM CHLORIDE 9 MG/ML
INJECTION, SOLUTION INTRAVENOUS PRN
Status: DISCONTINUED | OUTPATIENT
Start: 2023-10-05 | End: 2023-10-05 | Stop reason: HOSPADM

## 2023-10-05 RX ORDER — SODIUM CHLORIDE 9 MG/ML
INJECTION, SOLUTION INTRAVENOUS CONTINUOUS PRN
Status: DISCONTINUED | OUTPATIENT
Start: 2023-10-05 | End: 2023-10-05 | Stop reason: SDUPTHER

## 2023-10-05 RX ORDER — SODIUM CHLORIDE, SODIUM LACTATE, POTASSIUM CHLORIDE, CALCIUM CHLORIDE 600; 310; 30; 20 MG/100ML; MG/100ML; MG/100ML; MG/100ML
INJECTION, SOLUTION INTRAVENOUS CONTINUOUS
Status: DISCONTINUED | OUTPATIENT
Start: 2023-10-05 | End: 2023-10-05 | Stop reason: HOSPADM

## 2023-10-05 RX ORDER — ROCURONIUM BROMIDE 10 MG/ML
INJECTION, SOLUTION INTRAVENOUS PRN
Status: DISCONTINUED | OUTPATIENT
Start: 2023-10-05 | End: 2023-10-05 | Stop reason: SDUPTHER

## 2023-10-05 RX ORDER — ONDANSETRON 2 MG/ML
INJECTION INTRAMUSCULAR; INTRAVENOUS PRN
Status: DISCONTINUED | OUTPATIENT
Start: 2023-10-05 | End: 2023-10-05 | Stop reason: SDUPTHER

## 2023-10-05 RX ORDER — HEPARIN SODIUM 1000 [USP'U]/ML
INJECTION, SOLUTION INTRAVENOUS; SUBCUTANEOUS PRN
Status: DISCONTINUED | OUTPATIENT
Start: 2023-10-05 | End: 2023-10-05 | Stop reason: SDUPTHER

## 2023-10-05 RX ORDER — FENTANYL CITRATE 50 UG/ML
50 INJECTION, SOLUTION INTRAMUSCULAR; INTRAVENOUS EVERY 5 MIN PRN
Status: DISCONTINUED | OUTPATIENT
Start: 2023-10-05 | End: 2023-10-05 | Stop reason: HOSPADM

## 2023-10-05 RX ORDER — OXYCODONE HYDROCHLORIDE 5 MG/1
5 TABLET ORAL
Status: DISCONTINUED | OUTPATIENT
Start: 2023-10-05 | End: 2023-10-05 | Stop reason: HOSPADM

## 2023-10-05 RX ORDER — DEXAMETHASONE SODIUM PHOSPHATE 4 MG/ML
INJECTION, SOLUTION INTRA-ARTICULAR; INTRALESIONAL; INTRAMUSCULAR; INTRAVENOUS; SOFT TISSUE PRN
Status: DISCONTINUED | OUTPATIENT
Start: 2023-10-05 | End: 2023-10-05 | Stop reason: SDUPTHER

## 2023-10-05 RX ORDER — ONDANSETRON 2 MG/ML
4 INJECTION INTRAMUSCULAR; INTRAVENOUS
Status: DISCONTINUED | OUTPATIENT
Start: 2023-10-05 | End: 2023-10-05 | Stop reason: HOSPADM

## 2023-10-05 RX ORDER — METOPROLOL SUCCINATE 50 MG/1
25 TABLET, EXTENDED RELEASE ORAL DAILY
Qty: 90 TABLET | Refills: 1 | Status: SHIPPED | OUTPATIENT
Start: 2023-10-05

## 2023-10-05 RX ORDER — PROTAMINE SULFATE 10 MG/ML
INJECTION, SOLUTION INTRAVENOUS PRN
Status: DISCONTINUED | OUTPATIENT
Start: 2023-10-05 | End: 2023-10-05 | Stop reason: SDUPTHER

## 2023-10-05 RX ORDER — MIDAZOLAM HYDROCHLORIDE 1 MG/ML
INJECTION INTRAMUSCULAR; INTRAVENOUS PRN
Status: DISCONTINUED | OUTPATIENT
Start: 2023-10-05 | End: 2023-10-05 | Stop reason: SDUPTHER

## 2023-10-05 RX ORDER — LIDOCAINE HYDROCHLORIDE 20 MG/ML
INJECTION, SOLUTION EPIDURAL; INFILTRATION; INTRACAUDAL; PERINEURAL PRN
Status: DISCONTINUED | OUTPATIENT
Start: 2023-10-05 | End: 2023-10-05 | Stop reason: SDUPTHER

## 2023-10-05 RX ORDER — HYDROMORPHONE HYDROCHLORIDE 2 MG/ML
0.5 INJECTION, SOLUTION INTRAMUSCULAR; INTRAVENOUS; SUBCUTANEOUS EVERY 5 MIN PRN
Status: DISCONTINUED | OUTPATIENT
Start: 2023-10-05 | End: 2023-10-05 | Stop reason: HOSPADM

## 2023-10-05 RX ORDER — HEPARIN SODIUM 200 [USP'U]/100ML
INJECTION, SOLUTION INTRAVENOUS PRN
Status: DISCONTINUED | OUTPATIENT
Start: 2023-10-05 | End: 2023-10-05

## 2023-10-05 RX ORDER — ACETAMINOPHEN 325 MG/1
650 TABLET ORAL EVERY 4 HOURS PRN
Status: DISCONTINUED | OUTPATIENT
Start: 2023-10-05 | End: 2023-10-05 | Stop reason: HOSPADM

## 2023-10-05 RX ORDER — FENTANYL CITRATE 50 UG/ML
INJECTION, SOLUTION INTRAMUSCULAR; INTRAVENOUS PRN
Status: DISCONTINUED | OUTPATIENT
Start: 2023-10-05 | End: 2023-10-05 | Stop reason: SDUPTHER

## 2023-10-05 RX ORDER — ACETAMINOPHEN 325 MG/1
650 TABLET ORAL ONCE
Status: DISCONTINUED | OUTPATIENT
Start: 2023-10-05 | End: 2023-10-05 | Stop reason: HOSPADM

## 2023-10-05 RX ORDER — GLYBURIDE-METFORMIN HYDROCHLORIDE 5; 500 MG/1; MG/1
1 TABLET ORAL 2 TIMES DAILY
Qty: 30 TABLET | Refills: 3 | Status: SHIPPED
Start: 2023-10-05

## 2023-10-05 RX ORDER — PANTOPRAZOLE SODIUM 40 MG/1
40 TABLET, DELAYED RELEASE ORAL
Qty: 30 TABLET | Refills: 0 | Status: SHIPPED | OUTPATIENT
Start: 2023-10-05

## 2023-10-05 RX ADMIN — HEPARIN SODIUM 1000 UNITS: 1000 INJECTION INTRAVENOUS; SUBCUTANEOUS at 13:52

## 2023-10-05 RX ADMIN — FENTANYL CITRATE 50 MCG: 50 INJECTION, SOLUTION INTRAMUSCULAR; INTRAVENOUS at 12:56

## 2023-10-05 RX ADMIN — HEPARIN SODIUM 3500 UNITS: 1000 INJECTION INTRAVENOUS; SUBCUTANEOUS at 11:55

## 2023-10-05 RX ADMIN — HEPARIN SODIUM 3000 UNITS: 1000 INJECTION INTRAVENOUS; SUBCUTANEOUS at 12:35

## 2023-10-05 RX ADMIN — PHENYLEPHRINE HYDROCHLORIDE 100 MCG: 10 INJECTION INTRAVENOUS at 14:20

## 2023-10-05 RX ADMIN — PHENYLEPHRINE HYDROCHLORIDE 200 MCG: 10 INJECTION INTRAVENOUS at 12:08

## 2023-10-05 RX ADMIN — PHENYLEPHRINE HYDROCHLORIDE 100 MCG: 10 INJECTION INTRAVENOUS at 14:28

## 2023-10-05 RX ADMIN — LIDOCAINE HYDROCHLORIDE 60 MG: 10 INJECTION, SOLUTION EPIDURAL; INFILTRATION; INTRACAUDAL; PERINEURAL at 11:22

## 2023-10-05 RX ADMIN — SODIUM CHLORIDE: 9 INJECTION, SOLUTION INTRAVENOUS at 11:14

## 2023-10-05 RX ADMIN — MIDAZOLAM 1 MG: 1 INJECTION INTRAMUSCULAR; INTRAVENOUS at 11:19

## 2023-10-05 RX ADMIN — MIDAZOLAM 1 MG: 1 INJECTION INTRAMUSCULAR; INTRAVENOUS at 11:15

## 2023-10-05 RX ADMIN — ONDANSETRON 4 MG: 2 INJECTION INTRAMUSCULAR; INTRAVENOUS at 14:25

## 2023-10-05 RX ADMIN — PHENYLEPHRINE HYDROCHLORIDE 150 MCG: 10 INJECTION INTRAVENOUS at 12:14

## 2023-10-05 RX ADMIN — Medication 120 MG: at 11:21

## 2023-10-05 RX ADMIN — PROPOFOL 120 MG: 10 INJECTION, EMULSION INTRAVENOUS at 11:21

## 2023-10-05 RX ADMIN — PHENYLEPHRINE HYDROCHLORIDE 100 MCG: 10 INJECTION INTRAVENOUS at 11:42

## 2023-10-05 RX ADMIN — HEPARIN SODIUM 4000 UNITS: 1000 INJECTION INTRAVENOUS; SUBCUTANEOUS at 12:15

## 2023-10-05 RX ADMIN — PROPOFOL 50 MG: 10 INJECTION, EMULSION INTRAVENOUS at 12:56

## 2023-10-05 RX ADMIN — PROTAMINE SULFATE 30 MG: 10 INJECTION, SOLUTION INTRAVENOUS at 14:28

## 2023-10-05 RX ADMIN — LIDOCAINE HYDROCHLORIDE 60 MG: 20 INJECTION, SOLUTION EPIDURAL; INFILTRATION; INTRACAUDAL; PERINEURAL at 11:21

## 2023-10-05 RX ADMIN — ISOPROTERENOL HYDROCHLORIDE 0.5 MCG/MIN: 0.2 INJECTION, SOLUTION INTRAMUSCULAR; INTRAVENOUS at 14:15

## 2023-10-05 RX ADMIN — DEXAMETHASONE SODIUM PHOSPHATE 8 MG: 4 INJECTION, SOLUTION INTRAMUSCULAR; INTRAVENOUS at 12:06

## 2023-10-05 RX ADMIN — PHENYLEPHRINE HYDROCHLORIDE 150 MCG: 10 INJECTION INTRAVENOUS at 12:05

## 2023-10-05 RX ADMIN — PHENYLEPHRINE HYDROCHLORIDE 100 MCG: 10 INJECTION INTRAVENOUS at 11:59

## 2023-10-05 RX ADMIN — FENTANYL CITRATE 50 MCG: 50 INJECTION, SOLUTION INTRAMUSCULAR; INTRAVENOUS at 11:21

## 2023-10-05 RX ADMIN — ROCURONIUM BROMIDE 10 MG: 10 INJECTION, SOLUTION INTRAVENOUS at 11:21

## 2023-10-05 RX ADMIN — PHENYLEPHRINE HYDROCHLORIDE 200 MCG: 10 INJECTION INTRAVENOUS at 12:33

## 2023-10-05 ASSESSMENT — LIFESTYLE VARIABLES: SMOKING_STATUS: 0

## 2023-10-05 NOTE — PROGRESS NOTES
Discharge instructions and new medication prescriptions reviewed with pt and pts daughter at bedside by Art Melgoza, both verbalized understanding. Pt safely dressed and ambulated to bathroom and in PACU, dressing remains CDI, groin soft. IV removed without complications. Pt discharged in wheelchair with all belongings to car by this RN, pt tolerated well.

## 2023-10-05 NOTE — PROGRESS NOTES
Phase 1 complete, pt seen by anesthesiologist. VSS, pt resting comfortably. R groin surgical site CDI. Pt sitting up. Will transition to phase 2 for d/c.

## 2023-10-05 NOTE — ANESTHESIA POSTPROCEDURE EVALUATION
Department of Anesthesiology  Postprocedure Note    Patient: Gilda Anaya  MRN: [id number]  YOB: 1960  Date of evaluation: 10/5/2023      Procedure Summary     Date: 10/05/23 Room / Location: [facility] Cath Lab; [facility] Echocardiography    Anesthesia Start: 1114 Anesthesia Stop: 1501    Procedure: ECHO/ABLATION WITH ANESTHESIA Diagnosis:       Other persistent atrial fibrillation      Typical atrial flutter    Scheduled Providers: Glenroy Mena MD Responsible Provider: Grey Soliman MD    Anesthesia Type: general ASA Status: 3          Anesthesia Type: No value filed.     Yina Phase I: Yina Score: 10    Yina Phase II: Yina Score: 10      Anesthesia Post Evaluation    Patient location during evaluation: PACU  Patient participation: complete - patient participated  Level of consciousness: awake and alert  Airway patency: patent  Nausea & Vomiting: no nausea and no vomiting  Complications: no  Cardiovascular status: hemodynamically stable  Respiratory status: acceptable  Hydration status: stable  Multimodal analgesia pain management approach  Pain management: adequate

## 2023-10-05 NOTE — PROGRESS NOTES
Pt arrived to PACU from cath lab, VSS, pt arouse to voice. R groin site is CDI, no hematoma noted. Pt is sinus evans on monitor. R pedal pulse is palpable. Purewick in place. Will continue to monitor.

## 2023-10-05 NOTE — PROCEDURES
[facility]     Electrophysiology Procedure Note       Date of Procedure: 10/5/2023  Patient's Name: Carol Blankenship  YOB: 1960   Medical Record Number: [id number]  Referring Physician: Swati Rajan MD  Procedure Performed by: Apolinar Khanna MD    Procedure performed:  Electrophysiology study with radiofrequency ablation of atrial fibrillation and pulmonary vein isolation   Additional ablation with creation of a roof line and mitral isthmus, anterior line and posterior inferior line. Flutter CS 5-6 early, then CS 3/4 early then distal CS early. , 230, 245 ms. Additional ablation of complex fractionated atrial electrograms (for atrial fibrillation) x3   3-D electroanatomical mapping of the left atrium    Transseptal puncture through an intact septum using versacross under intracardiac ultrasound guidance without fluoroscopic guidance   Intracardiac echocardiography  Left ventricular pacing and recording  Drug infusion with an attempt to induce atrial tachydysrhythmia  Transesophageal echocardiogram  External cardioversion of the atrial arrhythmias   Extensive ablation with multiple flutters making it an especially long and difficult case. Anesthesia: General anesthesia provided by the Anesthesia service    Indications for procedure:    Carol Blankenship is a 61 y.o. female who has a history of persistent atrial fibrillation and flutter, previously ablated extensively in 2020 who is symptomatic with symptoms of dyspnea with minimal exertion and fatigue who has failed antiarrhythmic therapy in the past is now here for a redo ablation for long standing persistent atrial fibrillation and flutter    Details of Procedure: The risks, benefits and alternatives of the ablation procedure were discussed with the patient.  The risks including, but not limited to, the risks of bleeding, infection, radiation exposure, injury to vascular, cardiac and surrounding structures (including tachydysrhythmia. The ablation catheter were moved from the left atrium to the left ventricular apex and His bundle position. His bundle potentials was recorded and pacing and recordings were performed from right atrium, coronary sinus and LV apex with the following results:     Sinus cycle length was 1185 msec  VT interval was 191 msec  QRS duration was 112 msec  QT interval was 568 msec  AH interval was 71 msec  HV interval was 52 msec  Pacing from left atrium, 1:1 conduction over AV node with (AV wenckebach) was 390  msec  Pacing from left atrium, FPERP 600/300 AV franklin ERP was <600/210 msec     After ablation was complete, catheters were placed in the left and right atrium, His-position, right ventricle, and left ventricle for pacing and recording. Isuprel titrated to at least 25% increase in HR was started. Arrhythmia was attempted to be induced by rapid atrial and ventricular pacing, and there was no induction of any other atrial tachydysrhythmia. Then both the ablation and CS catheters were removed from the body. ICE was then used to check if there was new or change in pericardial effusion. Trivial pericardial effusion noted before the start of the procedure was seen at the end with no change/increase in size. ICE catheter was then removed. All the 3 sheaths were removed from the right femoral vein. Since patient was on anticoagulation with heparin with high ACT, we used Perclose device for closure of access sites on the right femoral vein. 30 mg of protamine was given to partially reverse the ACT. The patient tolerated the procedure well and there were no complications. Patient was extubated and transferred to the floor in stable condition. Blood loss <20 cc. No complication     Conclusion:      Successful Pulmonary vein isolations using wide area circumferential radiofrequency ablation with confirmation of exit and entrance block.    Ablation of multiple atrial flutter changing in

## 2023-10-05 NOTE — PROGRESS NOTES
ARRIVAL TO CATH LAB: 9:48 AM      ID & ALLERGY BAND: on     CONSENT: Yes    LABS/PREGNANCY TEST: N/A    PULSES: Right DP 2+  Right PT 2+  Left DP 2+  Left PT 2+    NPO SINCE:  12 am    IV SITE : Started in left arm.  with fluids infusing at kvo 9:48 AM     Ekg  Rhythm: Atrial Fibrillation

## 2023-10-05 NOTE — DISCHARGE INSTRUCTIONS
Ablation    Care of your puncture site:  Remove bandage 24 hours after the procedure. May shower in 24 hours but do not sit in a bathtub/pool of water for 3 days or until the wound is healed. Inspect the site daily and gently clean using soap and water while standing in the shower. Dry thoroughly and apply a Band-Aid that covers the entire site. Do not apply powder or lotion. Normal Observations:  Soreness or tenderness which may last one week. Mild oozing from the incision site. Possible bruising that could last 2 weeks. Activity:  You may resume driving 24 hours following the procedure. You may resume normal activity in 3 days or after the wound heals. Avoid lifting more than 10 pounds for 3 days or until the wound heals. Avoid strenuous exercise or activity for 1 week. Nutrition:  Regular diet  Drink at least 8 to 10 glasses of decaffeinated, non-alcoholic fluid for the next 24 hours to flush the x-ray dye used for your angiogram out of your body. Call your doctor immediately if your condition worsens, for any other concerns, for a follow-up appointment or if you experience any of the following:  Significant bleeding that does not stop after 10 minutes of applying firm pressure on the puncture site. Increased swelling on the groin or leg. Unusual pain, numbness, or tingling of the groin or down the leg. Any signs of infection such as: redness, yellow drainage at the site, swelling or pain. ANESTHESIA DISCHARGE INSTRUCTIONS    Wear your seatbelt home. You are under the influence of drugs-do not drink alcohol, drive, operate machinery, make any important decisions or sign any legal documents for 24 hours. Children should not ride bikes, [place] or play on gym sets for 24 hours after surgery. A responsible adult needs to be with you for 24 hours. You may experience lightheadedness, dizziness, or sleepiness following surgery.   Rest at home today- increase activity as

## 2023-10-05 NOTE — H&P
H&P Update    I have reviewed the history and physical from 10/5/2023 and examined the patient and find no relevant changes. I have reviewed with the patient and/or family the risks, benefits, and alternatives to the procedure. Pre-sedation Assessment    Patient:  Allyssa Bill   :   1960    Intended Procedure: JOAO/ afib and flutter ablation    Nurses notes reviewed and agreed. Medications reviewed  Allergies:    Allergies   Allergen Reactions    Codeine Nausea And Vomiting     Pre-Procedure Assessment/Plan:  ASA 3 - Patient with moderate systemic disease with functional limitations    Level of Sedation Plan: per anesthesia    Post Procedure plan: Return to same level of care    Lissette Wick MD  Cardiac Electrophysiology  [address] [place] [address]

## 2023-10-06 ENCOUNTER — TELEPHONE (OUTPATIENT)
Dept: CARDIOLOGY CLINIC | Age: 63
End: 2023-10-06

## 2023-10-06 NOTE — TELEPHONE ENCOUNTER
----- Message from Elba Martínez MD sent at 10/6/2023 10:06 AM EDT -----  Regarding: letter  Trinidad Campbell,    Do you mind writing a letter for Ms. Carlos Rivera to be off next week. I did an extensive atrial fibrillation and flutter ablation redo yesterday.      Thanks,    MW

## 2023-10-06 NOTE — TELEPHONE ENCOUNTER
Return to work letter sent to patient via [facility] post ablation follow up scheduled.

## 2023-10-07 ENCOUNTER — APPOINTMENT (OUTPATIENT)
Dept: GENERAL RADIOLOGY | Age: 63
End: 2023-10-07
Payer: COMMERCIAL

## 2023-10-07 ENCOUNTER — HOSPITAL ENCOUNTER (OUTPATIENT)
Age: 63
Setting detail: OBSERVATION
Discharge: HOME | End: 2023-10-08
Attending: EMERGENCY MEDICINE | Admitting: INTERNAL MEDICINE
Payer: COMMERCIAL

## 2023-10-07 DIAGNOSIS — Z98.890 S/P ABLATION OF ATRIAL FIBRILLATION: ICD-10-CM

## 2023-10-07 DIAGNOSIS — I30.9 ACUTE PERICARDITIS, UNSPECIFIED TYPE: Primary | ICD-10-CM

## 2023-10-07 DIAGNOSIS — Z86.79 S/P ABLATION OF ATRIAL FIBRILLATION: ICD-10-CM

## 2023-10-07 PROBLEM — R07.9 CHEST PAIN: Status: ACTIVE | Noted: 2023-10-07

## 2023-10-07 LAB
ALBUMIN SERPL-MCNC: 4.5 G/DL (ref 3.4–5)
ALBUMIN/GLOB SERPL: 1.8 {RATIO} (ref 1.1–2.2)
ALP SERPL-CCNC: 84 U/L (ref 40–129)
ALT SERPL-CCNC: 31 U/L (ref 10–40)
ANION GAP SERPL CALCULATED.3IONS-SCNC: 10 MMOL/L (ref 3–16)
AST SERPL-CCNC: 26 U/L (ref 15–37)
BACTERIA URNS QL MICRO: NORMAL /HPF
BASOPHILS # BLD: 0.1 K/UL (ref 0–0.2)
BASOPHILS NFR BLD: 0.7 %
BILIRUB SERPL-MCNC: 0.5 MG/DL (ref 0–1)
BILIRUB UR QL STRIP.AUTO: NEGATIVE
BUN SERPL-MCNC: 21 MG/DL (ref 7–20)
CALCIUM SERPL-MCNC: 9.4 MG/DL (ref 8.3–10.6)
CHLORIDE SERPL-SCNC: 103 MMOL/L (ref 99–110)
CLARITY UR: CLEAR
CO2 SERPL-SCNC: 26 MMOL/L (ref 21–32)
COLOR UR: YELLOW
CREAT SERPL-MCNC: 1.1 MG/DL (ref 0.6–1.2)
DEPRECATED RDW RBC AUTO: 14.3 % (ref 12.4–15.4)
EKG ATRIAL RATE: 76 BPM
EKG DIAGNOSIS: NORMAL
EKG P AXIS: 26 DEGREES
EKG P-R INTERVAL: 184 MS
EKG Q-T INTERVAL: 374 MS
EKG QRS DURATION: 88 MS
EKG QTC CALCULATION (BAZETT): 420 MS
EKG R AXIS: 0 DEGREES
EKG T AXIS: 70 DEGREES
EKG VENTRICULAR RATE: 76 BPM
EOSINOPHIL # BLD: 0.1 K/UL (ref 0–0.6)
EOSINOPHIL NFR BLD: 0.7 %
EPI CELLS #/AREA URNS AUTO: 1 /HPF (ref 0–5)
EST. AVERAGE GLUCOSE BLD GHB EST-MCNC: 131.2 MG/DL
GFR SERPLBLD CREATININE-BSD FMLA CKD-EPI: 56 ML/MIN/{1.73_M2}
GLUCOSE BLD-MCNC: 159 MG/DL (ref 70–99)
GLUCOSE BLD-MCNC: 177 MG/DL (ref 70–99)
GLUCOSE BLD-MCNC: 242 MG/DL (ref 70–99)
GLUCOSE SERPL-MCNC: 164 MG/DL (ref 70–99)
GLUCOSE UR STRIP.AUTO-MCNC: 100 MG/DL
HBA1C MFR BLD: 6.2 %
HCT VFR BLD AUTO: 33.6 % (ref 36–48)
HGB BLD-MCNC: 11.4 G/DL (ref 12–16)
HGB UR QL STRIP.AUTO: NEGATIVE
HYALINE CASTS #/AREA URNS AUTO: 1 /LPF (ref 0–8)
KETONES UR STRIP.AUTO-MCNC: NEGATIVE MG/DL
LEUKOCYTE ESTERASE UR QL STRIP.AUTO: ABNORMAL
LIPASE SERPL-CCNC: 46 U/L (ref 13–60)
LYMPHOCYTES # BLD: 2.3 K/UL (ref 1–5.1)
LYMPHOCYTES NFR BLD: 17.9 %
MCH RBC QN AUTO: 33.1 PG (ref 26–34)
MCHC RBC AUTO-ENTMCNC: 33.9 G/DL (ref 31–36)
MCV RBC AUTO: 97.9 FL (ref 80–100)
MONOCYTES # BLD: 0.9 K/UL (ref 0–1.3)
MONOCYTES NFR BLD: 7.5 %
NEUTROPHILS # BLD: 9.3 K/UL (ref 1.7–7.7)
NEUTROPHILS NFR BLD: 73.2 %
NITRITE UR QL STRIP.AUTO: NEGATIVE
NT-PROBNP SERPL-MCNC: 705 PG/ML (ref 0–124)
PERFORMED ON: ABNORMAL
PH UR STRIP.AUTO: 5 [PH] (ref 5–8)
PLATELET # BLD AUTO: 250 K/UL (ref 135–450)
PMV BLD AUTO: 8.9 FL (ref 5–10.5)
POTASSIUM SERPL-SCNC: 4.2 MMOL/L (ref 3.5–5.1)
PROT SERPL-MCNC: 7 G/DL (ref 6.4–8.2)
PROT UR STRIP.AUTO-MCNC: NEGATIVE MG/DL
RBC # BLD AUTO: 3.44 M/UL (ref 4–5.2)
RBC CLUMPS #/AREA URNS AUTO: 0 /HPF (ref 0–4)
SARS-COV-2 RDRP RESP QL NAA+PROBE: NOT DETECTED
SODIUM SERPL-SCNC: 139 MMOL/L (ref 136–145)
SP GR UR STRIP.AUTO: 1.01 (ref 1–1.03)
TROPONIN, HIGH SENSITIVITY: 449 NG/L (ref 0–14)
TROPONIN, HIGH SENSITIVITY: 540 NG/L (ref 0–14)
TROPONIN, HIGH SENSITIVITY: 561 NG/L (ref 0–14)
TROPONIN, HIGH SENSITIVITY: 632 NG/L (ref 0–14)
UA COMPLETE W REFLEX CULTURE PNL UR: ABNORMAL
UA DIPSTICK W REFLEX MICRO PNL UR: YES
URN SPEC COLLECT METH UR: ABNORMAL
UROBILINOGEN UR STRIP-ACNC: 0.2 E.U./DL
WBC # BLD AUTO: 12.7 K/UL (ref 4–11)
WBC #/AREA URNS AUTO: 5 /HPF (ref 0–5)

## 2023-10-07 PROCEDURE — 84484 ASSAY OF TROPONIN QUANT: CPT

## 2023-10-07 PROCEDURE — 83036 HEMOGLOBIN GLYCOSYLATED A1C: CPT

## 2023-10-07 PROCEDURE — 93005 ELECTROCARDIOGRAM TRACING: CPT | Performed by: EMERGENCY MEDICINE

## 2023-10-07 PROCEDURE — 99223 1ST HOSP IP/OBS HIGH 75: CPT | Performed by: INTERNAL MEDICINE

## 2023-10-07 PROCEDURE — A4216 STERILE WATER/SALINE, 10 ML: HCPCS | Performed by: PHYSICIAN ASSISTANT

## 2023-10-07 PROCEDURE — 80053 COMPREHEN METABOLIC PANEL: CPT

## 2023-10-07 PROCEDURE — 6370000000 HC RX 637 (ALT 250 FOR IP): Performed by: INTERNAL MEDICINE

## 2023-10-07 PROCEDURE — 2580000003 HC RX 258: Performed by: PHYSICIAN ASSISTANT

## 2023-10-07 PROCEDURE — 6370000000 HC RX 637 (ALT 250 FOR IP): Performed by: PHYSICIAN ASSISTANT

## 2023-10-07 PROCEDURE — G0378 HOSPITAL OBSERVATION PER HR: HCPCS

## 2023-10-07 PROCEDURE — 83690 ASSAY OF LIPASE: CPT

## 2023-10-07 PROCEDURE — 36415 COLL VENOUS BLD VENIPUNCTURE: CPT

## 2023-10-07 PROCEDURE — 96374 THER/PROPH/DIAG INJ IV PUSH: CPT

## 2023-10-07 PROCEDURE — 87635 SARS-COV-2 COVID-19 AMP PRB: CPT

## 2023-10-07 PROCEDURE — 2580000003 HC RX 258: Performed by: INTERNAL MEDICINE

## 2023-10-07 PROCEDURE — 81001 URINALYSIS AUTO W/SCOPE: CPT

## 2023-10-07 PROCEDURE — 99285 EMERGENCY DEPT VISIT HI MDM: CPT

## 2023-10-07 PROCEDURE — 93010 ELECTROCARDIOGRAM REPORT: CPT | Performed by: INTERNAL MEDICINE

## 2023-10-07 PROCEDURE — 85025 COMPLETE CBC W/AUTO DIFF WBC: CPT

## 2023-10-07 PROCEDURE — 71046 X-RAY EXAM CHEST 2 VIEWS: CPT

## 2023-10-07 PROCEDURE — 96375 TX/PRO/DX INJ NEW DRUG ADDON: CPT

## 2023-10-07 PROCEDURE — 6360000002 HC RX W HCPCS: Performed by: INTERNAL MEDICINE

## 2023-10-07 PROCEDURE — 2500000003 HC RX 250 WO HCPCS: Performed by: PHYSICIAN ASSISTANT

## 2023-10-07 PROCEDURE — 6370000000 HC RX 637 (ALT 250 FOR IP): Performed by: EMERGENCY MEDICINE

## 2023-10-07 PROCEDURE — 6360000002 HC RX W HCPCS: Performed by: PHYSICIAN ASSISTANT

## 2023-10-07 PROCEDURE — 83880 ASSAY OF NATRIURETIC PEPTIDE: CPT

## 2023-10-07 RX ORDER — SUCRALFATE 1 G/1
1 TABLET ORAL EVERY 6 HOURS SCHEDULED
Status: DISCONTINUED | OUTPATIENT
Start: 2023-10-07 | End: 2023-10-08 | Stop reason: HOSPADM

## 2023-10-07 RX ORDER — ACETAMINOPHEN 325 MG/1
650 TABLET ORAL EVERY 6 HOURS PRN
Status: DISCONTINUED | OUTPATIENT
Start: 2023-10-07 | End: 2023-10-08 | Stop reason: HOSPADM

## 2023-10-07 RX ORDER — DICYCLOMINE HCL 20 MG
20 TABLET ORAL 4 TIMES DAILY PRN
Status: DISCONTINUED | OUTPATIENT
Start: 2023-10-07 | End: 2023-10-08 | Stop reason: HOSPADM

## 2023-10-07 RX ORDER — ASPIRIN 325 MG
325 TABLET ORAL ONCE
Status: COMPLETED | OUTPATIENT
Start: 2023-10-07 | End: 2023-10-07

## 2023-10-07 RX ORDER — COLCHICINE 0.6 MG/1
1.2 TABLET ORAL ONCE
Status: COMPLETED | OUTPATIENT
Start: 2023-10-07 | End: 2023-10-07

## 2023-10-07 RX ORDER — INSULIN LISPRO 100 [IU]/ML
0-4 INJECTION, SOLUTION INTRAVENOUS; SUBCUTANEOUS
Status: DISCONTINUED | OUTPATIENT
Start: 2023-10-07 | End: 2023-10-08 | Stop reason: HOSPADM

## 2023-10-07 RX ORDER — NITROGLYCERIN 0.4 MG/1
0.4 TABLET SUBLINGUAL EVERY 5 MIN PRN
Status: DISCONTINUED | OUTPATIENT
Start: 2023-10-07 | End: 2023-10-07 | Stop reason: SDUPTHER

## 2023-10-07 RX ORDER — VALSARTAN 80 MG/1
160 TABLET ORAL DAILY
Status: DISCONTINUED | OUTPATIENT
Start: 2023-10-07 | End: 2023-10-08 | Stop reason: HOSPADM

## 2023-10-07 RX ORDER — ASPIRIN 325 MG
325 TABLET, DELAYED RELEASE (ENTERIC COATED) ORAL DAILY
Status: DISCONTINUED | OUTPATIENT
Start: 2023-10-08 | End: 2023-10-08 | Stop reason: HOSPADM

## 2023-10-07 RX ORDER — ACETAMINOPHEN 650 MG/1
650 SUPPOSITORY RECTAL EVERY 6 HOURS PRN
Status: DISCONTINUED | OUTPATIENT
Start: 2023-10-07 | End: 2023-10-08 | Stop reason: HOSPADM

## 2023-10-07 RX ORDER — NITROGLYCERIN 0.4 MG/1
0.4 TABLET SUBLINGUAL EVERY 5 MIN PRN
Status: DISCONTINUED | OUTPATIENT
Start: 2023-10-07 | End: 2023-10-08 | Stop reason: HOSPADM

## 2023-10-07 RX ORDER — DEXTROSE MONOHYDRATE 100 MG/ML
INJECTION, SOLUTION INTRAVENOUS CONTINUOUS PRN
Status: DISCONTINUED | OUTPATIENT
Start: 2023-10-07 | End: 2023-10-08 | Stop reason: HOSPADM

## 2023-10-07 RX ORDER — MULTIVITAMIN WITH IRON
1 TABLET ORAL DAILY
Status: DISCONTINUED | OUTPATIENT
Start: 2023-10-07 | End: 2023-10-08 | Stop reason: HOSPADM

## 2023-10-07 RX ORDER — COLCHICINE 0.6 MG/1
0.6 TABLET ORAL DAILY
Status: DISCONTINUED | OUTPATIENT
Start: 2023-10-07 | End: 2023-10-08 | Stop reason: HOSPADM

## 2023-10-07 RX ORDER — METOPROLOL SUCCINATE 25 MG/1
25 TABLET, EXTENDED RELEASE ORAL DAILY
Status: DISCONTINUED | OUTPATIENT
Start: 2023-10-07 | End: 2023-10-08 | Stop reason: HOSPADM

## 2023-10-07 RX ORDER — SODIUM CHLORIDE 0.9 % (FLUSH) 0.9 %
10 SYRINGE (ML) INJECTION EVERY 12 HOURS SCHEDULED
Status: DISCONTINUED | OUTPATIENT
Start: 2023-10-07 | End: 2023-10-08 | Stop reason: HOSPADM

## 2023-10-07 RX ORDER — FUROSEMIDE 10 MG/ML
40 INJECTION INTRAMUSCULAR; INTRAVENOUS ONCE
Status: COMPLETED | OUTPATIENT
Start: 2023-10-07 | End: 2023-10-07

## 2023-10-07 RX ORDER — MORPHINE SULFATE 2 MG/ML
2 INJECTION, SOLUTION INTRAMUSCULAR; INTRAVENOUS EVERY 4 HOURS PRN
Status: DISCONTINUED | OUTPATIENT
Start: 2023-10-07 | End: 2023-10-08 | Stop reason: HOSPADM

## 2023-10-07 RX ORDER — PHENAZOPYRIDINE HYDROCHLORIDE 200 MG/1
200 TABLET, FILM COATED ORAL
Status: DISCONTINUED | OUTPATIENT
Start: 2023-10-07 | End: 2023-10-08 | Stop reason: HOSPADM

## 2023-10-07 RX ORDER — ONDANSETRON 4 MG/1
4 TABLET, ORALLY DISINTEGRATING ORAL EVERY 8 HOURS PRN
Status: DISCONTINUED | OUTPATIENT
Start: 2023-10-07 | End: 2023-10-08 | Stop reason: HOSPADM

## 2023-10-07 RX ORDER — AMIODARONE HYDROCHLORIDE 200 MG/1
200 TABLET ORAL 2 TIMES DAILY
Status: DISCONTINUED | OUTPATIENT
Start: 2023-10-07 | End: 2023-10-08 | Stop reason: HOSPADM

## 2023-10-07 RX ORDER — ONDANSETRON 2 MG/ML
4 INJECTION INTRAMUSCULAR; INTRAVENOUS EVERY 6 HOURS PRN
Status: DISCONTINUED | OUTPATIENT
Start: 2023-10-07 | End: 2023-10-08 | Stop reason: HOSPADM

## 2023-10-07 RX ORDER — ASPIRIN 325 MG
650 TABLET, DELAYED RELEASE (ENTERIC COATED) ORAL EVERY 8 HOURS
Status: DISCONTINUED | OUTPATIENT
Start: 2023-10-07 | End: 2023-10-07

## 2023-10-07 RX ORDER — ATORVASTATIN CALCIUM 40 MG/1
40 TABLET, FILM COATED ORAL NIGHTLY
Status: DISCONTINUED | OUTPATIENT
Start: 2023-10-07 | End: 2023-10-08 | Stop reason: HOSPADM

## 2023-10-07 RX ORDER — INSULIN GLARGINE 100 [IU]/ML
10 INJECTION, SOLUTION SUBCUTANEOUS NIGHTLY
Status: DISCONTINUED | OUTPATIENT
Start: 2023-10-07 | End: 2023-10-08 | Stop reason: HOSPADM

## 2023-10-07 RX ORDER — POLYETHYLENE GLYCOL 3350 17 G/17G
17 POWDER, FOR SOLUTION ORAL DAILY PRN
Status: DISCONTINUED | OUTPATIENT
Start: 2023-10-07 | End: 2023-10-08 | Stop reason: HOSPADM

## 2023-10-07 RX ORDER — FLUTICASONE PROPIONATE 50 MCG
1 SPRAY, SUSPENSION (ML) NASAL DAILY PRN
Status: DISCONTINUED | OUTPATIENT
Start: 2023-10-07 | End: 2023-10-08 | Stop reason: HOSPADM

## 2023-10-07 RX ORDER — SODIUM CHLORIDE 9 MG/ML
INJECTION, SOLUTION INTRAVENOUS PRN
Status: DISCONTINUED | OUTPATIENT
Start: 2023-10-07 | End: 2023-10-08 | Stop reason: HOSPADM

## 2023-10-07 RX ORDER — AMIODARONE HYDROCHLORIDE 200 MG/1
200 TABLET ORAL DAILY
Status: DISCONTINUED | OUTPATIENT
Start: 2023-10-07 | End: 2023-10-07 | Stop reason: SDUPTHER

## 2023-10-07 RX ORDER — PANTOPRAZOLE SODIUM 40 MG/1
40 TABLET, DELAYED RELEASE ORAL
Status: DISCONTINUED | OUTPATIENT
Start: 2023-10-08 | End: 2023-10-08 | Stop reason: HOSPADM

## 2023-10-07 RX ORDER — SODIUM CHLORIDE 0.9 % (FLUSH) 0.9 %
10 SYRINGE (ML) INJECTION PRN
Status: DISCONTINUED | OUTPATIENT
Start: 2023-10-07 | End: 2023-10-08 | Stop reason: HOSPADM

## 2023-10-07 RX ORDER — INSULIN LISPRO 100 [IU]/ML
0-4 INJECTION, SOLUTION INTRAVENOUS; SUBCUTANEOUS NIGHTLY
Status: DISCONTINUED | OUTPATIENT
Start: 2023-10-07 | End: 2023-10-08 | Stop reason: HOSPADM

## 2023-10-07 RX ADMIN — RIVAROXABAN 20 MG: 20 TABLET, FILM COATED ORAL at 17:43

## 2023-10-07 RX ADMIN — PHENAZOPYRIDINE 200 MG: 200 TABLET, FILM COATED ORAL at 20:21

## 2023-10-07 RX ADMIN — INSULIN GLARGINE 10 UNITS: 100 INJECTION, SOLUTION SUBCUTANEOUS at 20:23

## 2023-10-07 RX ADMIN — AMIODARONE HYDROCHLORIDE 200 MG: 200 TABLET ORAL at 20:22

## 2023-10-07 RX ADMIN — SUCRALFATE 1 G: 1 TABLET ORAL at 23:37

## 2023-10-07 RX ADMIN — ASPIRIN 325 MG: 325 TABLET ORAL at 07:12

## 2023-10-07 RX ADMIN — INSULIN LISPRO 1 UNITS: 100 INJECTION, SOLUTION INTRAVENOUS; SUBCUTANEOUS at 17:43

## 2023-10-07 RX ADMIN — ATORVASTATIN CALCIUM 40 MG: 40 TABLET, FILM COATED ORAL at 20:22

## 2023-10-07 RX ADMIN — COLCHICINE 1.2 MG: 0.6 TABLET, FILM COATED ORAL at 10:16

## 2023-10-07 RX ADMIN — Medication 10 ML: at 20:23

## 2023-10-07 RX ADMIN — AMIODARONE HYDROCHLORIDE 200 MG: 200 TABLET ORAL at 10:16

## 2023-10-07 RX ADMIN — VALSARTAN 160 MG: 80 TABLET ORAL at 12:35

## 2023-10-07 RX ADMIN — SUCRALFATE 1 G: 1 TABLET ORAL at 12:35

## 2023-10-07 RX ADMIN — MORPHINE SULFATE 2 MG: 2 INJECTION, SOLUTION INTRAMUSCULAR; INTRAVENOUS at 20:21

## 2023-10-07 RX ADMIN — COLCHICINE 0.6 MG: 0.6 TABLET ORAL at 17:43

## 2023-10-07 RX ADMIN — SUCRALFATE 1 G: 1 TABLET ORAL at 17:43

## 2023-10-07 RX ADMIN — FUROSEMIDE 40 MG: 10 INJECTION, SOLUTION INTRAMUSCULAR; INTRAVENOUS at 10:12

## 2023-10-07 RX ADMIN — NITROGLYCERIN 0.4 MG: 0.4 TABLET, ORALLY DISINTEGRATING SUBLINGUAL at 07:18

## 2023-10-07 RX ADMIN — FAMOTIDINE 20 MG: 10 INJECTION, SOLUTION INTRAVENOUS at 07:16

## 2023-10-07 ASSESSMENT — PAIN SCALES - GENERAL
PAINLEVEL_OUTOF10: 3
PAINLEVEL_OUTOF10: 6
PAINLEVEL_OUTOF10: 7
PAINLEVEL_OUTOF10: 8

## 2023-10-07 ASSESSMENT — PAIN DESCRIPTION - DESCRIPTORS
DESCRIPTORS: BURNING;DISCOMFORT
DESCRIPTORS: BURNING
DESCRIPTORS: BURNING;ACHING

## 2023-10-07 ASSESSMENT — PAIN DESCRIPTION - ORIENTATION
ORIENTATION: LOWER;MID
ORIENTATION: MID;LOWER

## 2023-10-07 ASSESSMENT — PAIN DESCRIPTION - LOCATION
LOCATION: ABDOMEN;CHEST
LOCATION: CHEST
LOCATION: CHEST

## 2023-10-07 ASSESSMENT — PAIN - FUNCTIONAL ASSESSMENT: PAIN_FUNCTIONAL_ASSESSMENT: 0-10

## 2023-10-07 NOTE — ED PROVIDER NOTES
[address] [facility] [place] [address] [address]      Pt Name: Lisa Min  MRN: [id number]  [address] [facility] [place] 1960  Date of evaluation: 10/7/2023  Provider: AMIE Alexis  PCP: Vincent Sauceda MD  Note Started: 6:55 AM EDT     This patient was also seen and evaluated by the attending physician Justine Delgado MD.    [address]       Chief Complaint   Patient presents with    Chest Pain    Shortness of Breath     Patient ambulatory to [address] [place] [address] with complaint of chest pain, SOB and wheezing. Symptoms started around 10 pm last night. Couldn't sleep due to the burning in chest. Patient had ablation on Thursday for AFIB       HISTORY OF PRESENT ILLNESS   (Location, Timing/Onset, Context/Setting, Quality, Duration, Modifying Factors, Severity, Associated Signs and Symptoms)  Note limiting factors. Lisa Min is a 61 y.o. female who presents with complaint of chest discomfort, little worse on the right, beginning later in the day yesterday. 2 days ago she had a cardiac ablation performed under general anesthesia, with history of atrial fibrillation. She still is feeling a little bit of a sore throat after the procedure, but the symptoms mentioned here did not begin until last night. She says its a little bit hard to take a deep breath, and it makes her chest discomfort worse to do so. At rest, however, she still has stomach discomfort. She says is not terribly painful. She thought it might be gas at first but took a Tums and it did not help. She does not have any digestive problems or get indigestion frequently, but when she does a Tums usually takes care of it. She denies abdominal pain presently. No vomiting, not really nauseous. Denies any cold symptoms. Says yesterday was just a day recovery for her, no extremely stressful activity, no trauma.     Nursing Notes were all reviewed and agreed with or any disagreements were addressed in the

## 2023-10-07 NOTE — H&P
Hospital Medicine History and Physical    10/7/2023    Date of Admission: 10/7/2023    Date of Service: Pt seen/examined on 10/7/2023 and admitted to observation. Assessment/plan:  Chest pain, pleuritic. Possible pericarditis, per cardiology recommendation. Continue aspirin, colchicine. Elevated troponin, likely secondary to recent ablation therapy. Initial high-sensitivity troponin of 632, repeat of 561; troponin trend is not consistent with ACS. Chest x-ray with mild pulmonary vascular congestion and edema. Patient received IV Lasix 40 mg x 1 in the emergency room. Patient had recent echocardiogram that appears to be normal.  Low suspicion for CHF. Dysuria. Urinalysis is currently pending - if abnormal, will treat as possible cystitis. Other comorbidities: history of paroxysmal atrial fibrillation (on chronic anticoagulation with Xarelto), diabetes type 2, hypertension associated with diabetes, hyperlipidemia associated with diabetes, status post ablation (for atrial fibrillation) on 10/5/2023, recent normal echocardiogram on 9/14/2023. Activities: Up with assist  Prophylaxis: Xarelto  Code status: Full    ==========================================================  Chief complaint:  Chief Complaint   Patient presents with    Chest Pain    Shortness of Breath     Patient ambulatory to [address] [place] [address] with complaint of chest pain, SOB and wheezing. Symptoms started around 10 pm last night. Couldn't sleep due to the burning in chest. Patient had ablation on Thursday for AFIB         History of Presenting Illness:   This is a pleasant 80-year-old female with history of paroxysmal atrial fibrillation (on chronic anticoagulation with Xarelto), diabetes type 2, hypertension associated with diabetes, hyperlipidemia associated with diabetes, status post ablation (for atrial fibrillation) on 10/5/2023, recent normal echocardiogram on 9/14/2023, who presents to the emergency room with complaints of pleuritic

## 2023-10-07 NOTE — ED NOTES
Report received from [facility]. EDSBAR discussed.      Estefany Solo RN  10/07/23 1669

## 2023-10-07 NOTE — ED NOTES
Report called to [facility]  on [address] [facility] discussed.      Nick Whyte RN  10/07/23 1029

## 2023-10-07 NOTE — ED PROVIDER NOTES
I was available for consultation during patient's ED stay. Patient was cared for by JESSICA. I did not evaluate or participate in patient care. EKG Interpretation    Interpreted by emergency department physician    Rhythm: normal sinus   Rate: normal  Axis: normal  Ectopy: none  Conduction: normal  ST Segments: Nonspecific changes  T Waves: Nonspecific changes  Q Waves: none    Clinical Impression: Normal sinus rhythm, no significant T wave or ST changes. Nonspecific ST and T wave changes which are relatively unchanged if anything slightly improved from previous EKG from October 5, 2023 at that time patient had T wave inversions in anterior leads. Normal OR interval, normal QRS duration, normal QT QTC. Normal axis. No arrhythmia or signs of ischemia. Otherwise normal EKG. Interpreted by myself.           MD Riaz Lozano MD  10/07/23 6240

## 2023-10-07 NOTE — ED TRIAGE NOTES
Patient ambulatory to [facility] with complaint of chest pain, SOB and wheezing. Symptoms started around 10 pm last night.  Couldn't sleep due to the burning in chest.

## 2023-10-07 NOTE — ED TRIAGE NOTES
Patient ambulatory to [facility] with complaint of chest pain, SOB and wheezing. Symptoms started around 10 pm last night.  Couldn't sleep due to the burning in chest.

## 2023-10-07 NOTE — CONSULTS
edema . Neurological: Alert and oriented. Grossly normal with no focal neurological deficit. Skin: Skin is warm and dry. Iban Wang Psychiatric: No anxiety nor agitation. Labs:  Reviewed. Recent Labs     10/05/23  0950 10/07/23  0659   * 139   K 4.7 4.2    103   CO2 24 26   BUN 17 21*   CREATININE 0.9 1.1     Recent Labs     10/05/23  0950 10/07/23  0659   WBC 7.3 12.7*   HGB 12.8 11.4*   HCT 38.1 33.6*   MCV 96.9 97.9    250     Lab Results   Component Value Date/Time    TROPONINI <0.01 06/10/2021 08:21 PM     No results found for: \"BNP\"  Lab Results   Component Value Date/Time    PROTIME 16.2 12/20/2019 03:32 PM    INR 1.39 12/20/2019 03:32 PM     Lab Results   Component Value Date/Time    CHOL 159 04/12/2023 08:17 AM    HDL 60 04/12/2023 08:17 AM    HDL 48 11/01/2011 11:10 AM    TRIG 125 04/12/2023 08:17 AM       Diagnostic and imaging results reviewed. ECG: ECG shows normal sinus rhythm with nonspecific ST-T wave changes. QTc is shorter than previous ECG on 10/5/2020    Echo: 9/15/2023  Conclusions      Summary   Patient appears to be in atrial fibrillation. Normal left ventricle size, wall thickness, and systolic function with an   estimated ejection fraction of 55-60%. No regional wall motion abnormalities   are seen. The right ventricle is normal in size and function. The left atrium is moderately dilated. Mild mitral regurgitation. Signature      ------------------------------------------------------------------   Electronically signed by Tone Leal MD   (Interpreting physician) on 09/15/2023 at 10:02 AM    Cath: None    I independently reviewed and interpreted the ECG, telemetry, serology, echocardiographic results.     Scheduled Meds:   amiodarone  200 mg Oral BID    atorvastatin  40 mg Oral Nightly    metoprolol succinate  25 mg Oral Daily    multivitamin  1 tablet Oral Daily    [START ON 10/8/2023] pantoprazole  40 mg Oral QAM AC    valsartan  160 mg Oral

## 2023-10-08 VITALS
OXYGEN SATURATION: 93 % | SYSTOLIC BLOOD PRESSURE: 102 MMHG | HEIGHT: 63 IN | TEMPERATURE: 97.9 F | WEIGHT: 142.64 LBS | RESPIRATION RATE: 18 BRPM | BODY MASS INDEX: 25.27 KG/M2 | DIASTOLIC BLOOD PRESSURE: 66 MMHG | HEART RATE: 65 BPM

## 2023-10-08 LAB
ALBUMIN SERPL-MCNC: 4 G/DL (ref 3.4–5)
ALBUMIN SERPL-MCNC: 4 G/DL (ref 3.4–5)
ALBUMIN SERPL-MCNC: 4.1 G/DL (ref 3.4–5)
ALBUMIN/GLOB SERPL: 1.5 {RATIO} (ref 1.1–2.2)
ALP SERPL-CCNC: 85 U/L (ref 40–129)
ALP SERPL-CCNC: 85 U/L (ref 40–129)
ALP SERPL-CCNC: 89 U/L (ref 40–129)
ALT SERPL-CCNC: 24 U/L (ref 10–40)
ALT SERPL-CCNC: 25 U/L (ref 10–40)
ALT SERPL-CCNC: 27 U/L (ref 10–40)
ANION GAP SERPL CALCULATED.3IONS-SCNC: 12 MMOL/L (ref 3–16)
AST SERPL-CCNC: 18 U/L (ref 15–37)
AST SERPL-CCNC: 19 U/L (ref 15–37)
AST SERPL-CCNC: 19 U/L (ref 15–37)
BASOPHILS # BLD: 0 K/UL (ref 0–0.2)
BASOPHILS NFR BLD: 0.4 %
BILIRUB DIRECT SERPL-MCNC: 0.3 MG/DL (ref 0–0.3)
BILIRUB DIRECT SERPL-MCNC: 0.3 MG/DL (ref 0–0.3)
BILIRUB INDIRECT SERPL-MCNC: 1.2 MG/DL (ref 0–1)
BILIRUB INDIRECT SERPL-MCNC: 1.2 MG/DL (ref 0–1)
BILIRUB SERPL-MCNC: 1.5 MG/DL (ref 0–1)
BILIRUB SERPL-MCNC: 1.5 MG/DL (ref 0–1)
BILIRUB SERPL-MCNC: 1.7 MG/DL (ref 0–1)
BUN SERPL-MCNC: 17 MG/DL (ref 7–20)
CALCIUM SERPL-MCNC: 8.6 MG/DL (ref 8.3–10.6)
CHLORIDE SERPL-SCNC: 97 MMOL/L (ref 99–110)
CHOLEST SERPL-MCNC: 147 MG/DL (ref 0–199)
CO2 SERPL-SCNC: 26 MMOL/L (ref 21–32)
CREAT SERPL-MCNC: 0.9 MG/DL (ref 0.6–1.2)
DEPRECATED RDW RBC AUTO: 14.5 % (ref 12.4–15.4)
EOSINOPHIL # BLD: 0.1 K/UL (ref 0–0.6)
EOSINOPHIL NFR BLD: 1.4 %
GFR SERPLBLD CREATININE-BSD FMLA CKD-EPI: >60 ML/MIN/{1.73_M2}
GLUCOSE BLD-MCNC: 178 MG/DL (ref 70–99)
GLUCOSE BLD-MCNC: 224 MG/DL (ref 70–99)
GLUCOSE SERPL-MCNC: 216 MG/DL (ref 70–99)
HCT VFR BLD AUTO: 34.1 % (ref 36–48)
HDLC SERPL-MCNC: 67 MG/DL (ref 40–60)
HGB BLD-MCNC: 11.6 G/DL (ref 12–16)
LDLC SERPL CALC-MCNC: 51 MG/DL
LYMPHOCYTES # BLD: 1.8 K/UL (ref 1–5.1)
LYMPHOCYTES NFR BLD: 18.9 %
MAGNESIUM SERPL-MCNC: 1.7 MG/DL (ref 1.8–2.4)
MCH RBC QN AUTO: 33.4 PG (ref 26–34)
MCHC RBC AUTO-ENTMCNC: 34.1 G/DL (ref 31–36)
MCV RBC AUTO: 98 FL (ref 80–100)
MONOCYTES # BLD: 1.1 K/UL (ref 0–1.3)
MONOCYTES NFR BLD: 11.2 %
NEUTROPHILS # BLD: 6.6 K/UL (ref 1.7–7.7)
NEUTROPHILS NFR BLD: 68.1 %
PERFORMED ON: ABNORMAL
PERFORMED ON: ABNORMAL
PHOSPHATE SERPL-MCNC: 3.6 MG/DL (ref 2.5–4.9)
PLATELET # BLD AUTO: 237 K/UL (ref 135–450)
PMV BLD AUTO: 9 FL (ref 5–10.5)
POC ACT LR: >400 SEC
POTASSIUM SERPL-SCNC: 3.7 MMOL/L (ref 3.5–5.1)
PROT SERPL-MCNC: 6.6 G/DL (ref 6.4–8.2)
PROT SERPL-MCNC: 6.7 G/DL (ref 6.4–8.2)
PROT SERPL-MCNC: 6.9 G/DL (ref 6.4–8.2)
RBC # BLD AUTO: 3.48 M/UL (ref 4–5.2)
SODIUM SERPL-SCNC: 135 MMOL/L (ref 136–145)
TRIGL SERPL-MCNC: 144 MG/DL (ref 0–150)
VLDLC SERPL CALC-MCNC: 29 MG/DL
WBC # BLD AUTO: 9.7 K/UL (ref 4–11)

## 2023-10-08 PROCEDURE — 83735 ASSAY OF MAGNESIUM: CPT

## 2023-10-08 PROCEDURE — 36415 COLL VENOUS BLD VENIPUNCTURE: CPT

## 2023-10-08 PROCEDURE — 94760 N-INVAS EAR/PLS OXIMETRY 1: CPT

## 2023-10-08 PROCEDURE — 6370000000 HC RX 637 (ALT 250 FOR IP): Performed by: INTERNAL MEDICINE

## 2023-10-08 PROCEDURE — 80061 LIPID PANEL: CPT

## 2023-10-08 PROCEDURE — 84100 ASSAY OF PHOSPHORUS: CPT

## 2023-10-08 PROCEDURE — 80053 COMPREHEN METABOLIC PANEL: CPT

## 2023-10-08 PROCEDURE — 6360000002 HC RX W HCPCS: Performed by: INTERNAL MEDICINE

## 2023-10-08 PROCEDURE — 96366 THER/PROPH/DIAG IV INF ADDON: CPT

## 2023-10-08 PROCEDURE — G0378 HOSPITAL OBSERVATION PER HR: HCPCS

## 2023-10-08 PROCEDURE — 85025 COMPLETE CBC W/AUTO DIFF WBC: CPT

## 2023-10-08 PROCEDURE — 96365 THER/PROPH/DIAG IV INF INIT: CPT

## 2023-10-08 RX ORDER — COLCHICINE 0.6 MG/1
0.6 TABLET ORAL DAILY
Qty: 7 TABLET | Refills: 0 | Status: SHIPPED | OUTPATIENT
Start: 2023-10-09 | End: 2023-10-16

## 2023-10-08 RX ORDER — PHENAZOPYRIDINE HYDROCHLORIDE 200 MG/1
200 TABLET, FILM COATED ORAL
Qty: 4 TABLET | Refills: 0 | Status: SHIPPED | OUTPATIENT
Start: 2023-10-08 | End: 2023-10-10

## 2023-10-08 RX ORDER — LANOLIN ALCOHOL/MO/W.PET/CERES
400 CREAM (GRAM) TOPICAL DAILY
Status: DISCONTINUED | OUTPATIENT
Start: 2023-10-08 | End: 2023-10-08 | Stop reason: HOSPADM

## 2023-10-08 RX ORDER — MAGNESIUM SULFATE IN WATER 40 MG/ML
2000 INJECTION, SOLUTION INTRAVENOUS ONCE
Status: COMPLETED | OUTPATIENT
Start: 2023-10-08 | End: 2023-10-08

## 2023-10-08 RX ORDER — LANOLIN ALCOHOL/MO/W.PET/CERES
400 CREAM (GRAM) TOPICAL DAILY
Qty: 10 TABLET | Refills: 0 | Status: SHIPPED | OUTPATIENT
Start: 2023-10-08

## 2023-10-08 RX ADMIN — SUCRALFATE 1 G: 1 TABLET ORAL at 06:32

## 2023-10-08 RX ADMIN — THERA TABS 1 TABLET: TAB at 08:43

## 2023-10-08 RX ADMIN — PHENAZOPYRIDINE 200 MG: 200 TABLET, FILM COATED ORAL at 08:37

## 2023-10-08 RX ADMIN — PANTOPRAZOLE SODIUM 40 MG: 40 TABLET, DELAYED RELEASE ORAL at 06:32

## 2023-10-08 RX ADMIN — COLCHICINE 0.6 MG: 0.6 TABLET ORAL at 08:42

## 2023-10-08 RX ADMIN — Medication 400 MG: at 10:32

## 2023-10-08 RX ADMIN — INSULIN LISPRO 1 UNITS: 100 INJECTION, SOLUTION INTRAVENOUS; SUBCUTANEOUS at 08:42

## 2023-10-08 RX ADMIN — AMIODARONE HYDROCHLORIDE 200 MG: 200 TABLET ORAL at 08:37

## 2023-10-08 RX ADMIN — ACETAMINOPHEN 650 MG: 325 TABLET ORAL at 04:40

## 2023-10-08 RX ADMIN — MAGNESIUM SULFATE HEPTAHYDRATE 2000 MG: 40 INJECTION, SOLUTION INTRAVENOUS at 10:36

## 2023-10-08 RX ADMIN — ASPIRIN 325 MG: 325 TABLET, COATED ORAL at 08:37

## 2023-10-08 ASSESSMENT — PAIN SCALES - GENERAL
PAINLEVEL_OUTOF10: 5
PAINLEVEL_OUTOF10: 2

## 2023-10-08 ASSESSMENT — PAIN DESCRIPTION - DESCRIPTORS: DESCRIPTORS: SORE

## 2023-10-08 ASSESSMENT — PAIN DESCRIPTION - LOCATION
LOCATION: ABDOMEN;CHEST
LOCATION: THROAT

## 2023-10-08 ASSESSMENT — PAIN DESCRIPTION - ORIENTATION: ORIENTATION: MID

## 2023-10-08 NOTE — DISCHARGE SUMMARY
Hospital Discharge Summary    Patient's PCP: Chao Lisa MD  Admit Date: 10/7/2023   Discharge Date: 10/8/2023    Admitting Physician: Dr. Jak Jimenez MD  Discharge Physician: Dr. Jak Jimenez MD     Consults:   IP CONSULT TO CARDIOLOGY    Brief HPI: Patient admitted with chest pain    Brief hospital course: 70-year-old female with history of paroxysmal atrial fibrillation (on chronic anticoagulation with Xarelto), diabetes type 2, hypertension associated with diabetes, hyperlipidemia associated with diabetes, status post ablation (for atrial fibrillation) on 10/5/2023, recent normal echocardiogram on 9/14/2023, who presents to the emergency room with complaints of pleuritic shortness of breath and chest discomfort, wheezes, onset since around 10 PM on 10/6/2023. She further reports some burning sensation in the chest. She has had dysuria for about 3 days - no fever or chills. She did feel cold the morning of 10/7/2023. In the emergency room, initial high-sensitivity troponin was 632, and a repeat of 561. She has mild leukocytosis of 12,700. Chest x-ray revealed mild pulmonary vascular congestion and edema. Cardiology service consulted, concerned about possible pericarditis and recommended colchicine and aspirin. Patient admitted for further evaluation and management. Assessment/plan:  Chest pain, pleuritic. Possible pericarditis, per cardiology recommendation. She was initially started on aspirin and colchicine - to continue 1-week course of colchicine per cardiology recommendation. Elevated troponin, likely secondary to recent ablation therapy. Initial high-sensitivity troponin of 632, repeat of 561; troponin trend is not consistent with ACS. Chest x-ray with mild pulmonary vascular congestion and edema. Patient received IV Lasix 40 mg x 1 in the emergency room. Patient had recent echocardiogram that appears to be normal.  She does not have CHF. Dysuria, unclear etiology.

## 2023-10-08 NOTE — DISCHARGE INSTR - DIET

## 2023-10-08 NOTE — PROGRESS NOTES
4 Eyes Skin Assessment     NAME:  Earline Baez OF BIRTH:  1960  MEDICAL RECORD NUMBER:  [id number]    The patient is being assessed for  Admission    I agree that at least one RN has performed a thorough Head to Toe Skin Assessment on the patient. ALL assessment sites listed below have been assessed. Areas assessed by both nurses:    Head, Face, Ears, Shoulders, Back, Chest, Arms, Elbows, Hands, Sacrum. Buttock, Coccyx, Ischium, Legs. Feet and Heels, and Under Medical Devices         Does the Patient have a Wound?  No noted wound(s)       Matt Prevention initiated by RN: No  Wound Care Orders initiated by RN: No    Pressure Injury (Stage 3,4, Unstageable, DTI, NWPT, and Complex wounds) if present, place Wound referral order by RN under : No    New Ostomies, if present place, Ostomy referral order under : No     Nurse 1 eSignature: Electronically signed by Robinson Blanca RN on 10/7/23 at 11:46 AM EDT    **SHARE this note so that the co-signing nurse can place an eSignature**    Nurse 2 eSignature: Electronically signed by Kisha Mackenzie RN on 10/7/23 at 11:47 AM EDT
Pt arrived to floor from ED. Tele monitor placed. Pt alert and oriented, made aware of call light system. VSS, UAL. orders release and admission questions completed. No skin issues. Call light within reach.
Reviewed UA, not consistent with infection. Ordered pyridium for dysuria.     SIGNED: Yogi Dimas MD, MPH. 10/7/2023
Reviewed dc instructions with pt. Pt verbalized understanding. PIV removed. Dressing clean dry and intact. Pt dc to private residence. Pt ambulated to vehicle with spouse. Pt dc with personal belongings.
shifts:   In: 480 [P.O.:480]  Out: 300 [Urine:300]  I/O this shift:  In: 180 [P.O.:180]  Out: -       Meds:    magnesium oxide  400 mg Oral Daily    magnesium sulfate  2,000 mg IntraVENous Once    amiodarone  200 mg Oral BID    atorvastatin  40 mg Oral Nightly    metoprolol succinate  25 mg Oral Daily    multivitamin  1 tablet Oral Daily    pantoprazole  40 mg Oral QAM AC    valsartan  160 mg Oral Daily    insulin glargine  10 Units SubCUTAneous Nightly    insulin lispro  0-4 Units SubCUTAneous TID WC    insulin lispro  0-4 Units SubCUTAneous Nightly    rivaroxaban  20 mg Oral Daily    sodium chloride flush  10 mL IntraVENous 2 times per day    colchicine  0.6 mg Oral Daily    sucralfate  1 g Oral 4 times per day    aspirin  325 mg Oral Daily    phenazopyridine  200 mg Oral TID WC     Infusions:    dextrose      sodium chloride       PRN Meds: dicyclomine, fluticasone, glucose, dextrose bolus **OR** dextrose bolus, glucagon (rDNA), dextrose, sodium chloride flush, sodium chloride, ondansetron **OR** ondansetron, acetaminophen **OR** acetaminophen, polyethylene glycol, nitroGLYCERIN, morphine    Labs/imaging:  CBC:   Recent Labs     10/07/23  0659 10/08/23  0452   WBC 12.7* 9.7   HGB 11.4* 11.6*    237     BMP:    Recent Labs     10/07/23  0659 10/08/23  0452    135*   K 4.2 3.7    97*   CO2 26 26   BUN 21* 17   CREATININE 1.1 0.9   GLUCOSE 164* 216*     Hepatic:   Recent Labs     10/07/23  0659 10/08/23  0452   AST 26 18   ALT 31 24   BILITOT 0.5 1.7*   ALKPHOS 84 85       Rory Najera MD  -------------------------------  [facility]

## 2023-10-08 NOTE — PLAN OF CARE
Problem: Discharge Planning  Goal: Discharge to home or other facility with appropriate resources  10/7/2023 2253 by Zakiya Anderson RN  Outcome: Progressing  10/7/2023 1628 by Governor Claudia RN  Outcome: Progressing     Problem: Pain  Goal: Verbalizes/displays adequate comfort level or baseline comfort level  10/7/2023 2253 by Zakiya Anderson RN  Outcome: Progressing  10/7/2023 1628 by Governor Claudia RN  Outcome: Progressing     Problem: Respiratory - Adult  Goal: Achieves optimal ventilation and oxygenation  Outcome: Progressing     Problem: Cardiovascular - Adult  Goal: Maintains optimal cardiac output and hemodynamic stability  Outcome: Progressing  Goal: Absence of cardiac dysrhythmias or at baseline  Outcome: Progressing     Problem: Gastrointestinal - Adult  Goal: Minimal or absence of nausea and vomiting  Outcome: Progressing  Goal: Maintains or returns to baseline bowel function  Outcome: Progressing  Goal: Maintains adequate nutritional intake  Outcome: Progressing  Goal: Establish and maintain optimal ostomy function  Outcome: Progressing     Problem: Genitourinary - Adult  Goal: Absence of urinary retention  Outcome: Progressing

## 2023-10-09 ENCOUNTER — TELEPHONE (OUTPATIENT)
Dept: INTERNAL MEDICINE CLINIC | Age: 63
End: 2023-10-09

## 2023-10-09 NOTE — TELEPHONE ENCOUNTER
Care Transitions Initial Follow Up Call    Outreach made within 2 business days of discharge: Yes    Patient: Lesley Chester Patient : 1960   MRN: [id number]  Reason for Admission: There are no discharge diagnoses documented for the most recent discharge. Discharge Date: 10/8/23       Spoke with: [facility]    Discharge department/facility: [address] [place] [address] Patient Contact:  Was patient able to fill all prescriptions: No: Did not speak with patient  Was patient instructed to bring all medications to the follow-up visit: No: Did not speak with patient  Is patient taking all medications as directed in the discharge summary?  Did not speak with patient  Does patient understand their discharge instructions: No: Did not speak with patient  Does patient have questions or concerns that need addressed prior to 7-14 day follow up office visit: no    Scheduled appointment with PCP within 7-14 days    Follow Up  Future Appointments   Date Time Provider [address] [place] [address]   10/11/2023 11:00 AM Laila Del Cid MD [facility]   10/17/2023  8:30 AM Quentin Mackay MD [facility]   2023  2:40 PM Laila Del Cid MD [place], [place]

## 2023-10-12 ENCOUNTER — OFFICE VISIT (OUTPATIENT)
Dept: INTERNAL MEDICINE CLINIC | Age: 63
End: 2023-10-12

## 2023-10-12 ENCOUNTER — TELEPHONE (OUTPATIENT)
Dept: CARDIOLOGY CLINIC | Age: 63
End: 2023-10-12

## 2023-10-12 VITALS
HEART RATE: 98 BPM | DIASTOLIC BLOOD PRESSURE: 60 MMHG | BODY MASS INDEX: 26.05 KG/M2 | SYSTOLIC BLOOD PRESSURE: 115 MMHG | WEIGHT: 147 LBS | HEIGHT: 63 IN | OXYGEN SATURATION: 98 %

## 2023-10-12 DIAGNOSIS — R07.9 CHEST PAIN, UNSPECIFIED TYPE: ICD-10-CM

## 2023-10-12 DIAGNOSIS — D64.9 NORMOCYTIC ANEMIA: ICD-10-CM

## 2023-10-12 DIAGNOSIS — Z09 HOSPITAL DISCHARGE FOLLOW-UP: Primary | ICD-10-CM

## 2023-10-12 DIAGNOSIS — E83.42 HYPOMAGNESEMIA: ICD-10-CM

## 2023-10-12 DIAGNOSIS — I48.19 PERSISTENT ATRIAL FIBRILLATION (HCC): ICD-10-CM

## 2023-10-12 DIAGNOSIS — R17 ELEVATED BILIRUBIN: ICD-10-CM

## 2023-10-12 LAB
ALBUMIN SERPL-MCNC: 4.7 G/DL (ref 3.4–5)
ALP SERPL-CCNC: 108 U/L (ref 40–129)
ALT SERPL-CCNC: 35 U/L (ref 10–40)
AST SERPL-CCNC: 22 U/L (ref 15–37)
BASOPHILS # BLD: 0.1 K/UL (ref 0–0.2)
BASOPHILS NFR BLD: 0.9 %
BILIRUB DIRECT SERPL-MCNC: <0.2 MG/DL (ref 0–0.3)
BILIRUB INDIRECT SERPL-MCNC: NORMAL MG/DL (ref 0–1)
BILIRUB SERPL-MCNC: 0.7 MG/DL (ref 0–1)
DEPRECATED RDW RBC AUTO: 13.8 % (ref 12.4–15.4)
EOSINOPHIL # BLD: 0.3 K/UL (ref 0–0.6)
EOSINOPHIL NFR BLD: 3 %
HCT VFR BLD AUTO: 38.6 % (ref 36–48)
HGB BLD-MCNC: 12.7 G/DL (ref 12–16)
LYMPHOCYTES # BLD: 2.7 K/UL (ref 1–5.1)
LYMPHOCYTES NFR BLD: 31.4 %
MAGNESIUM SERPL-MCNC: 1.9 MG/DL (ref 1.8–2.4)
MCH RBC QN AUTO: 32.2 PG (ref 26–34)
MCHC RBC AUTO-ENTMCNC: 32.9 G/DL (ref 31–36)
MCV RBC AUTO: 98.1 FL (ref 80–100)
MONOCYTES # BLD: 0.8 K/UL (ref 0–1.3)
MONOCYTES NFR BLD: 9.3 %
NEUTROPHILS # BLD: 4.8 K/UL (ref 1.7–7.7)
NEUTROPHILS NFR BLD: 55.4 %
PLATELET # BLD AUTO: 371 K/UL (ref 135–450)
PMV BLD AUTO: 9 FL (ref 5–10.5)
PROT SERPL-MCNC: 7.1 G/DL (ref 6.4–8.2)
RBC # BLD AUTO: 3.93 M/UL (ref 4–5.2)
WBC # BLD AUTO: 8.6 K/UL (ref 4–11)

## 2023-10-12 RX ORDER — APREMILAST 30 MG/1
TABLET, FILM COATED ORAL
Qty: 60 TABLET | Refills: 0 | OUTPATIENT
Start: 2023-10-12

## 2023-10-12 ASSESSMENT — ENCOUNTER SYMPTOMS: SHORTNESS OF BREATH: 1

## 2023-10-12 NOTE — TELEPHONE ENCOUNTER
Glen Barton MD  You 40 minutes ago (4:12 PM)       Continue amiodarone for now.  Still early post procedure

## 2023-10-12 NOTE — PROGRESS NOTES
Post-Discharge Transitional Care Follow Up      Lord Comer   YOB: 1960    Date of Office Visit:  10/12/2023  Date of Hospital Admission: 10/7/23  Date of Hospital Discharge: 10/8/23  Readmission Risk Score (high >=14%. Medium >=10%):No data recorded    Care management risk score Rising risk (score 2-5) and Complex Care (Scores >=6): No Risk Score On File     Non face to face  following discharge, date last encounter closed (first attempt may have been earlier): 10/09/2023     Call initiated 2 business days of discharge: Yes     Hospital discharge follow-up  -     CA DISCHARGE MEDS RECONCILED W/ CURRENT OUTPATIENT MED LIST  -     Hepatic Function Panel; Future  -     CBC with Auto Differential; Future  Normocytic anemia  -     CBC with Auto Differential; Future  Elevated bilirubin  -     Hepatic Function Panel; Future  Hypomagnesemia: on PO mag  -     Magnesium; Future  Chest pain, unspecified type: intermittent since yesterday, EKG pt in Afib/flutter. Call to EP office to inform. Pt advised to follow up with cardiology. ER precautions advised. -     EKG 12 Lead - Clinic Performed; Future  Persistent atrial fibrillation ([address]): EKG in clinic, Afib/flutter. S/p ablation. On amiodarone, toprol and xarelto. Call to EP office to inform. Pt advised to follow up with cardiology. ER precautions advised. Medical Decision Making: high complexity  No follow-ups on file. On this date 10/12/2023 I have spent 40 minutes reviewing previous notes, test results and face to face with the patient discussing the diagnosis and importance of compliance with the treatment plan as well as documenting on the day of the visit. Subjective:   HPI    Inpatient course: Discharge summary reviewed- see chart. Briefly, pt presented to ER with c/o SOB and chest pain. She was admitted for pleuritic chest pain and possible pericarditis. She was treated with ASA and colchicine, to continue one week of colchicine.

## 2023-10-12 NOTE — TELEPHONE ENCOUNTER
Advised that although she has re manifested atrial flutterafter her ablation her heart is still in the process of healing and we will need to continue to let her heart heal and then once its has been 3 months post procedure her heart should be healed and if she were to re manifest atrial fibrillation and or atrial flutter we would then discuss further treatment options.

## 2023-10-12 NOTE — TELEPHONE ENCOUNTER
Elba Guzman called the office to report that Brenda's EKG from today's hosp f/u indicated that she was back in AFIB with a flutter. Elba Guzman shared that Tami Hui did complain of new left chest pain. Please advise. Elba Guzman instructed if pt. does not hear from office to call and make f/u appt.        Brenda's callback: [phone]

## 2023-10-17 ENCOUNTER — OFFICE VISIT (OUTPATIENT)
Dept: INTERNAL MEDICINE CLINIC | Age: 63
End: 2023-10-17
Payer: COMMERCIAL

## 2023-10-17 VITALS
SYSTOLIC BLOOD PRESSURE: 133 MMHG | WEIGHT: 146.8 LBS | DIASTOLIC BLOOD PRESSURE: 84 MMHG | HEART RATE: 102 BPM | BODY MASS INDEX: 26 KG/M2 | OXYGEN SATURATION: 97 % | TEMPERATURE: 98.6 F

## 2023-10-17 DIAGNOSIS — R53.83 FATIGUE, UNSPECIFIED TYPE: ICD-10-CM

## 2023-10-17 DIAGNOSIS — I48.19 PERSISTENT ATRIAL FIBRILLATION (HCC): ICD-10-CM

## 2023-10-17 DIAGNOSIS — E78.2 MIXED HYPERLIPIDEMIA: ICD-10-CM

## 2023-10-17 DIAGNOSIS — I10 ESSENTIAL HYPERTENSION: ICD-10-CM

## 2023-10-17 DIAGNOSIS — E11.9 TYPE 2 DIABETES MELLITUS WITHOUT COMPLICATION, WITHOUT LONG-TERM CURRENT USE OF INSULIN (HCC): Primary | ICD-10-CM

## 2023-10-17 DIAGNOSIS — K21.9 GASTROESOPHAGEAL REFLUX DISEASE, UNSPECIFIED WHETHER ESOPHAGITIS PRESENT: ICD-10-CM

## 2023-10-17 LAB
ANION GAP SERPL CALCULATED.3IONS-SCNC: 15 MMOL/L (ref 3–16)
BUN SERPL-MCNC: 26 MG/DL (ref 7–20)
CALCIUM SERPL-MCNC: 9.3 MG/DL (ref 8.3–10.6)
CHLORIDE SERPL-SCNC: 99 MMOL/L (ref 99–110)
CO2 SERPL-SCNC: 25 MMOL/L (ref 21–32)
CREAT SERPL-MCNC: 1.1 MG/DL (ref 0.6–1.2)
CREAT UR-MCNC: 212.3 MG/DL (ref 28–259)
GFR SERPLBLD CREATININE-BSD FMLA CKD-EPI: 56 ML/MIN/{1.73_M2}
GLUCOSE SERPL-MCNC: 93 MG/DL (ref 70–99)
MICROALBUMIN UR DL<=1MG/L-MCNC: 1.3 MG/DL
MICROALBUMIN/CREAT UR: 6.1 MG/G (ref 0–30)
POTASSIUM SERPL-SCNC: 4 MMOL/L (ref 3.5–5.1)
SODIUM SERPL-SCNC: 139 MMOL/L (ref 136–145)
TSH SERPL DL<=0.005 MIU/L-ACNC: 1.07 UIU/ML (ref 0.27–4.2)

## 2023-10-17 PROCEDURE — 3079F DIAST BP 80-89 MM HG: CPT | Performed by: INTERNAL MEDICINE

## 2023-10-17 PROCEDURE — 3044F HG A1C LEVEL LT 7.0%: CPT | Performed by: INTERNAL MEDICINE

## 2023-10-17 PROCEDURE — 99215 OFFICE O/P EST HI 40 MIN: CPT | Performed by: INTERNAL MEDICINE

## 2023-10-17 PROCEDURE — 3075F SYST BP GE 130 - 139MM HG: CPT | Performed by: INTERNAL MEDICINE

## 2023-10-17 PROCEDURE — 36415 COLL VENOUS BLD VENIPUNCTURE: CPT | Performed by: INTERNAL MEDICINE

## 2023-10-17 RX ORDER — APREMILAST 30 MG/1
1 TABLET, FILM COATED ORAL DAILY
Qty: 60 TABLET | Refills: 0 | Status: SHIPPED | OUTPATIENT
Start: 2023-10-17 | End: 2023-10-19 | Stop reason: SDUPTHER

## 2023-10-17 RX ORDER — PANTOPRAZOLE SODIUM 40 MG/1
40 TABLET, DELAYED RELEASE ORAL
Qty: 30 TABLET | Refills: 0 | Status: SHIPPED | OUTPATIENT
Start: 2023-10-17

## 2023-10-17 ASSESSMENT — ENCOUNTER SYMPTOMS
BLOOD IN STOOL: 0
NAUSEA: 0
VOMITING: 0
SHORTNESS OF BREATH: 1
ABDOMINAL PAIN: 0
SORE THROAT: 0
COUGH: 0

## 2023-10-17 NOTE — PROGRESS NOTES
Nathan Fernandez (:  1960) is a 61 y.o. female, Established patient, here for evaluation of the following chief complaint(s):  Diabetes         ASSESSMENT/PLAN:  1. Type 2 diabetes mellitus without complication, without long-term current use of insulin (HCC)  - Stable   - Continue current dose of glyburide-metformin  -     Microalbumin / creatinine urine ratio    2. Essential hypertension  - Stable   - Continue current dose of metoprolol, valsartan-hydrochlorothiazide    3. Mixed hyperlipidemia  - Stable   - Continue current dose of atorvastatin    4. Persistent atrial fibrillation (HCC)  S/p ablation. Patient has not been feeling good after the ablation she complains of palpitations, fatigue and shortness of breath. She was admitted in the hospital on 10/7/2023 for acute pericarditis after the ablation. Today her heart rate is 102 and pulse is irregular. Her EKG from 10/12/2023 shows atrial flutter. Advised patient to follow-up with her cardiologist as soon as possible for further management. 5. Gastroesophageal reflux disease, unspecified whether esophagitis present  - Stable   - Continue current dose of pantoprazole  -     pantoprazole (PROTONIX) 40 MG tablet; Take 1 tablet by mouth every morning (before breakfast), Disp-30 tablet, R-0Normal    6. Fatigue, unspecified type  New problem, worsening. Patient is not feeling well after the ablation. Advised patient to increase water intake. Checking BMP and TSH for evaluation, will treat based on the results. Advised patient to follow-up with her cardiologist as soon as possible. -     Basic Metabolic Panel  -     TSH with Reflex      Return in about 3 months (around 2024). Subjective   SUBJECTIVE/OBJECTIVE:  Diabetes  She presents for her follow-up diabetic visit. She has type 2 diabetes mellitus. Her disease course has been stable. Pertinent negatives for hypoglycemia include no dizziness. Associated symptoms include fatigue.

## 2023-10-19 RX ORDER — APREMILAST 30 MG/1
1 TABLET, FILM COATED ORAL DAILY
Qty: 60 TABLET | Refills: 0 | Status: SHIPPED | OUTPATIENT
Start: 2023-10-19

## 2023-10-19 NOTE — TELEPHONE ENCOUNTER
Last office visit 10/17/2023       Next office visit scheduled 1/24/2024    Requested Prescriptions     Pending Prescriptions Disp Refills    Apremilast (OTEZLA) 30 MG TABS 60 tablet 0     Sig: Take 1 tablet by mouth daily

## 2023-10-24 ENCOUNTER — TELEPHONE (OUTPATIENT)
Dept: CARDIOLOGY CLINIC | Age: 63
End: 2023-10-24

## 2023-10-24 DIAGNOSIS — I48.19 PERSISTENT ATRIAL FIBRILLATION (HCC): Primary | ICD-10-CM

## 2023-10-24 NOTE — TELEPHONE ENCOUNTER
Pt called to speak to Fay about returning to work. She would like it extended til she sees the dr again next week.       Brenda # [phone]

## 2023-10-25 RX ORDER — APREMILAST 30 MG/1
1 TABLET, FILM COATED ORAL 2 TIMES DAILY
Qty: 60 TABLET | Refills: 1 | Status: SHIPPED | OUTPATIENT
Start: 2023-10-25

## 2023-10-26 NOTE — TELEPHONE ENCOUNTER
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 She has complaints of \"severe shortness of breath\". HR is >100 bpm ranging from 114-120 bpm. She states she has ECG done at PCP office and was noted to be in AF/AFL. Currently taking Amiodarone 200 mg QD and Toprol XL 25 mg QD. Discussed with VIRY. He would like patient to undergo DCCV to restore sinus rhythm. Contacted the patient and advised her of this message and she is uneasy about proceeding with DCCV as she has had multiple in the past. Requesting to speak with VIRY.

## 2023-10-26 NOTE — TELEPHONE ENCOUNTER
Kushal Yepez called the office wanting to know if her time off from work could be extended after her OV on 11/8? She states that she is still experiencing the SOB, loss of sleep and feels that the stressors of her job will affect her recovery.     Brenda's callback: [phone]

## 2023-10-27 NOTE — TELEPHONE ENCOUNTER
Spoke with patient. Discussed CV. She is agreeable to move forward with scheduling on Monday but is adamant that she wants to speak with Dr. Josselyn Hastings. She would like to push back her return to work date for after she is seen in office on 11/8/2023 and Dr. Josselyn Hastings is agreeable to this. Discussed with Renny Torrez LPN she will ask Dr. Josselyn Hastings to call patient this afternoon.

## 2023-10-30 ENCOUNTER — HOSPITAL ENCOUNTER (OUTPATIENT)
Dept: CARDIAC CATH/INVASIVE PROCEDURES | Age: 63
Discharge: HOME | End: 2023-10-30
Payer: COMMERCIAL

## 2023-10-30 ENCOUNTER — TELEPHONE (OUTPATIENT)
Dept: CARDIOLOGY CLINIC | Age: 63
End: 2023-10-30

## 2023-10-30 VITALS
TEMPERATURE: 96.6 F | DIASTOLIC BLOOD PRESSURE: 66 MMHG | HEIGHT: 64 IN | BODY MASS INDEX: 25.27 KG/M2 | HEART RATE: 61 BPM | RESPIRATION RATE: 14 BRPM | SYSTOLIC BLOOD PRESSURE: 110 MMHG | WEIGHT: 148 LBS | OXYGEN SATURATION: 100 %

## 2023-10-30 LAB
EKG ATRIAL RATE: 63 BPM
EKG ATRIAL RATE: 79 BPM
EKG DIAGNOSIS: NORMAL
EKG DIAGNOSIS: NORMAL
EKG P AXIS: 43 DEGREES
EKG P-R INTERVAL: 192 MS
EKG Q-T INTERVAL: 448 MS
EKG Q-T INTERVAL: 460 MS
EKG QRS DURATION: 94 MS
EKG QRS DURATION: 96 MS
EKG QTC CALCULATION (BAZETT): 470 MS
EKG QTC CALCULATION (BAZETT): 548 MS
EKG R AXIS: -1 DEGREES
EKG R AXIS: -3 DEGREES
EKG T AXIS: 41 DEGREES
EKG T AXIS: 45 DEGREES
EKG VENTRICULAR RATE: 63 BPM
EKG VENTRICULAR RATE: 90 BPM

## 2023-10-30 PROCEDURE — 92960 CARDIOVERSION ELECTRIC EXT: CPT

## 2023-10-30 PROCEDURE — 99152 MOD SED SAME PHYS/QHP 5/>YRS: CPT | Performed by: INTERNAL MEDICINE

## 2023-10-30 PROCEDURE — 93005 ELECTROCARDIOGRAM TRACING: CPT | Performed by: INTERNAL MEDICINE

## 2023-10-30 PROCEDURE — 93010 ELECTROCARDIOGRAM REPORT: CPT | Performed by: INTERNAL MEDICINE

## 2023-10-30 PROCEDURE — 92960 CARDIOVERSION ELECTRIC EXT: CPT | Performed by: INTERNAL MEDICINE

## 2023-10-30 PROCEDURE — 2580000003 HC RX 258

## 2023-10-30 PROCEDURE — 2500000003 HC RX 250 WO HCPCS

## 2023-10-30 PROCEDURE — 2500000003 HC RX 250 WO HCPCS: Performed by: INTERNAL MEDICINE

## 2023-10-30 RX ORDER — SODIUM CHLORIDE 0.9 % (FLUSH) 0.9 %
5-40 SYRINGE (ML) INJECTION EVERY 12 HOURS SCHEDULED
Status: DISCONTINUED | OUTPATIENT
Start: 2023-10-30 | End: 2023-10-31 | Stop reason: HOSPADM

## 2023-10-30 RX ORDER — SODIUM CHLORIDE 9 MG/ML
INJECTION, SOLUTION INTRAVENOUS PRN
Status: DISCONTINUED | OUTPATIENT
Start: 2023-10-30 | End: 2023-10-31 | Stop reason: HOSPADM

## 2023-10-30 RX ORDER — SODIUM CHLORIDE 0.9 % (FLUSH) 0.9 %
5-40 SYRINGE (ML) INJECTION PRN
Status: DISCONTINUED | OUTPATIENT
Start: 2023-10-30 | End: 2023-10-31 | Stop reason: HOSPADM

## 2023-10-30 RX ADMIN — AMIODARONE HYDROCHLORIDE 150 MG: 1.5 INJECTION, SOLUTION INTRAVENOUS at 09:36

## 2023-10-30 NOTE — PROCEDURES
[facility]     Electrophysiology Procedure Note       Date of Procedure: 10/30/2023  Patient's Name: Nathan Fernandez  YOB: 1960   Medical Record Number: [id number]  Procedure Performed by: Amber Louis MD    Procedures performed:  IV sedation. External Electrical cardioversion   Mallampati 2  ASA 3    Indication of the procedure: Persistent Atrial flutter    Details of procedure: The patient was brought to the cath lab area in a fasting and non-sedated state. The risks, benefits and alternatives of the procedure were discussed with the patient. The patient opted to proceed with the procedure. Written informed consent was signed and placed in the chart. A timeout protocol was completed to identify the patient and the procedure being performed. Patient is on chronic anticoagulation therapy. I pushed 50 mg Brevital.    We monitored the patient's level of consciousness and vital signs/physiologic status throughout the procedure duration (see start and stop times below). Sedation:  Brevital  Sedation start: 9:17 am  Sedation stop: 9:35 am     DC cardioversion was perfomed using 200J, synchronized shock. Patient was converted to sinus rhythm at 64 bpm. The patient tolerated the procedure well and there were no complications. Conclusion:   Successful external DC cardioversion of persistent Atrial flutter (atypical)    Plan:   The patient can be discharged if remains stable. Will continue with medical therapy.      Amber Louis MD,   Cardiac Electrophysiology  [facility]  [address]  [place], [address] [place] [address]  [phone]

## 2023-10-30 NOTE — PROGRESS NOTES
CATH LAB PROCEDURE LOG - CARDIOVERSION      PRE PROCEDURE    Received as outpatient, arrived in atrial flutter, daughter present    DATE: 10/30/2023 ARRIVAL TO CATH LAB: 8:25 AM    ID & ALLERGY BAND: On    CONSENT: Yes    NPO SINCE: Midnight    IV SITE: Started in right A/C. Pt arrived to Cath Lab. Plan of Care: Hemodynamics and cardiac rhythm will remain stable. Comfort level will be maintained. Respiratory function will remain adequate. Pt/family will verbalize understanding of the procedure. Procedure will be tolerated without complications. Patient will recover from procedure without complications. ID armband on patient and identification verified. Informed consent obtained. Non invasive blood pressure cuff applied, monitoring initiated. EKG pads and pulse oximeter applied, monitoring initiated. Instructions given. Patient and / or family verbalize understanding. H&P will be documented by physician in [address] [place] [address]. Pt has been NPO since midnight. Post DCCV EKG ordered? No    Pre CV Rhythm: Atrial Flutter      CARDIOVERSION    Timeout and fire safety completed. TIMEOUT TIME: 9:11 AM    CORRECT PATIENT VERIFIED. MEMBERS OF THE SURGICAL TEAM/VISITORS INTRODUCED. ALLERGIES ANNOUNCED. CORRECT PROCEDURE VERIFIED. CORRECT PROCEDURAL SITE VERIFIED. CONSENTS VERIFIED. IMPLANT EQUIPMENT, ADDITIONAL SERVICES, SPECIAL REQUIREMENTS AVAILABLE. MEDICATIONS LABELED AND AVAILABLE. APPROPRIATE PRE MEDS HAVE BEEN ADMINISTERED. FIRE SAFETY: ALCOHOL PREP SOLUTION HAD SUFFICIENT TIME TO DISSIPATE IF USED.  FIRE SAFETY: SURGICAL SITE OR INCISION ABOVE THE XIPHOID. YES=1, NO=0. FIRE SAFETY: OPEN OXYGEN SOURCE. YES=1, NO=0. FIRE SAFETY: AVAILABLE IGNITION SOURCE. YES=1, NO=0. FIRE SAFETY: SCORE TOTAL = 1.     OXYGEN, SUCTION AND EMERGENCY EQUIPMENT SET UP AND AVAILABLE AT BEDSIDE  Defibrillation pads on and in place per protocol    Procedure Medication:     9:18 AM - Brevital 50 mg IV given by

## 2023-10-30 NOTE — H&P
H&P Update    I have reviewed the history and physical from 10/7/2023 and examined the patient and find no relevant changes. I have reviewed with the patient and/or family the risks, benefits, and alternatives to the procedure. Pre-sedation Assessment    Patient:  Ruth Gonzalez   :   1960    Intended Procedure: [facility]    Nurses notes reviewed and agreed. Medications reviewed  Allergies: Allergies   Allergen Reactions    Codeine Nausea And Vomiting     Pre-Procedure Assessment/Plan:  ASA 3 - Patient with moderate systemic disease with functional limitations    Level of Sedation Plan: Moderate sedation    Post Procedure plan: Return to same level of care    Naveed Rosario MD  Cardiac Electrophysiology  [address] [place] [address]

## 2023-11-02 NOTE — TELEPHONE ENCOUNTER
Yan Conley called in this afternoon, she would like to speak with someone concerning her medical leave paperwork, she also states the appointment scheduled on 11/30 she can't come to she needs an appointment after 2:30pm because she plans on being back to work by then. She can be reached at [phone].

## 2023-11-03 ENCOUNTER — TELEPHONE (OUTPATIENT)
Dept: CARDIOLOGY CLINIC | Age: 63
End: 2023-11-03

## 2023-11-03 NOTE — TELEPHONE ENCOUNTER
[address] [facility] office called to confirm it was ok for Brenda to have her teeth, cleaned, x rays and a fluoride treatment. She did take her blood thinner. Per Dr Yovani Camejo it is ok. Relayed message to dental office.

## 2023-11-03 NOTE — TELEPHONE ENCOUNTER
Thuy Cruz dropped off disability forms to be filled out and faxed. Patient will also  paper work once it has been faxed.   Paper work has been placed in folder in the back    Damir Bowen can be reached at [phone]

## 2023-11-07 DIAGNOSIS — I10 ESSENTIAL HYPERTENSION: ICD-10-CM

## 2023-11-07 RX ORDER — VALSARTAN AND HYDROCHLOROTHIAZIDE 160; 12.5 MG/1; MG/1
1 TABLET, FILM COATED ORAL DAILY
Qty: 90 TABLET | Refills: 1 | Status: SHIPPED | OUTPATIENT
Start: 2023-11-07

## 2023-11-07 NOTE — TELEPHONE ENCOUNTER
Future Appointments   Date Time Provider [address]   11/30/2023 12:20 PM Rochel Mohs APRN - CNP [facility]   1/24/2024  7:45 AM Pedro Rivera MD [facility] appt 10/17/23

## 2023-11-08 NOTE — TELEPHONE ENCOUNTER
I spoke with patient on 11/7/2023 discussed completion of paperwork. Will have completed and signed by Dr. Nagi Mayer by end of day today. Once completed will call patient to notify.

## 2023-11-09 NOTE — TELEPHONE ENCOUNTER
Disability forms has been completed and faxed to [facility]. Scanned completed forms and fax confirmation to PT's chart. Called/spoke to PT and advised that forms have been faxed and we received confirmation that all were sent successfully. PT requested a copy of the paperwork mailed to her home address. Mailed copy to address on file.

## 2023-11-27 ENCOUNTER — TELEPHONE (OUTPATIENT)
Dept: CARDIOLOGY CLINIC | Age: 63
End: 2023-11-27

## 2023-11-27 NOTE — TELEPHONE ENCOUNTER
Pt called very upset she said her 11/8 appt was cancelled because of a schedule change and she said she could not make the 11/30 appt with [facility] because she is working now. Nothing open soon at [facility] either? Suggestions.

## 2023-12-04 ENCOUNTER — OFFICE VISIT (OUTPATIENT)
Dept: CARDIOLOGY CLINIC | Age: 63
End: 2023-12-04
Payer: COMMERCIAL

## 2023-12-04 VITALS
HEIGHT: 64 IN | SYSTOLIC BLOOD PRESSURE: 100 MMHG | DIASTOLIC BLOOD PRESSURE: 68 MMHG | BODY MASS INDEX: 25.61 KG/M2 | HEART RATE: 115 BPM | WEIGHT: 150 LBS | OXYGEN SATURATION: 97 %

## 2023-12-04 DIAGNOSIS — I10 ESSENTIAL HYPERTENSION: ICD-10-CM

## 2023-12-04 DIAGNOSIS — I48.19 PERSISTENT ATRIAL FIBRILLATION (HCC): Primary | ICD-10-CM

## 2023-12-04 DIAGNOSIS — I48.3 TYPICAL ATRIAL FLUTTER (HCC): ICD-10-CM

## 2023-12-04 DIAGNOSIS — I48.4 ATYPICAL ATRIAL FLUTTER (HCC): ICD-10-CM

## 2023-12-04 PROCEDURE — 3078F DIAST BP <80 MM HG: CPT | Performed by: NURSE PRACTITIONER

## 2023-12-04 PROCEDURE — 99214 OFFICE O/P EST MOD 30 MIN: CPT | Performed by: NURSE PRACTITIONER

## 2023-12-04 PROCEDURE — 93000 ELECTROCARDIOGRAM COMPLETE: CPT | Performed by: NURSE PRACTITIONER

## 2023-12-04 PROCEDURE — 3074F SYST BP LT 130 MM HG: CPT | Performed by: NURSE PRACTITIONER

## 2023-12-04 RX ORDER — RANOLAZINE 500 MG/1
500 TABLET, EXTENDED RELEASE ORAL 2 TIMES DAILY
Qty: 60 TABLET | Refills: 3 | Status: SHIPPED | OUTPATIENT
Start: 2023-12-04

## 2023-12-04 RX ORDER — AMIODARONE HYDROCHLORIDE 200 MG/1
200 TABLET ORAL DAILY
Qty: 90 TABLET | Refills: 0
Start: 2023-12-04

## 2023-12-04 ASSESSMENT — ENCOUNTER SYMPTOMS
SINUS PRESSURE: 0
DIARRHEA: 0
VOMITING: 0
SORE THROAT: 0
BACK PAIN: 0
WHEEZING: 0
COLOR CHANGE: 0
NAUSEA: 0
TROUBLE SWALLOWING: 0
SHORTNESS OF BREATH: 1
CONSTIPATION: 0
ABDOMINAL PAIN: 0
COUGH: 0
BLOOD IN STOOL: 0

## 2023-12-04 NOTE — PROGRESS NOTES
be open to dofetilide after the first of the year. She will come back to the office in about a month for an EKG and if she remains in atrial flutter will need to consider either dofetilide or AV node ablation with pacemaker implantation    Essential HTN   - controlled   - continue medical therapy   - managed by PCP    Thank you for allowing to us to participate in the care of Jason Larios. EKG in 1 month, follow up with Dr. Camila Clemons depending on decision after EKG.     JAZMYN Salazar  [facility]  [address], [address], [address]  Phone: [phone]  Fax: [phone]    Electronically signed by JAZMYN Thomson - CNP on 12/4/2023 at 2:11 PM

## 2023-12-05 DIAGNOSIS — E78.2 MIXED HYPERLIPIDEMIA: ICD-10-CM

## 2023-12-05 RX ORDER — ATORVASTATIN CALCIUM 40 MG/1
TABLET, FILM COATED ORAL
Qty: 90 TABLET | Refills: 1 | Status: SHIPPED | OUTPATIENT
Start: 2023-12-05

## 2023-12-05 NOTE — TELEPHONE ENCOUNTER
Future Appointments   Date Time Provider 4600  [address]   12/22/2023  3:00 PM SCHEDULE, [facility]   1/24/2024  7:45 AM Vincent Sauceda MD [facility] appt 10/17/23

## 2023-12-11 RX ORDER — APREMILAST 30 MG/1
1 TABLET, FILM COATED ORAL DAILY
Qty: 60 TABLET | Refills: 1 | Status: SHIPPED | OUTPATIENT
Start: 2023-12-11

## 2023-12-11 NOTE — TELEPHONE ENCOUNTER
Future Appointments   Date Time Provider 4600  [address]   12/22/2023  3:00 PM SCHEDULE, [facility]   1/24/2024  7:45 AM Vincent Sauceda MD [facility] appt 10/17/23

## 2023-12-13 DIAGNOSIS — I10 ESSENTIAL HYPERTENSION: ICD-10-CM

## 2023-12-13 RX ORDER — AMIODARONE HYDROCHLORIDE 200 MG/1
TABLET ORAL
Qty: 90 TABLET | Refills: 0 | OUTPATIENT
Start: 2023-12-13

## 2023-12-13 NOTE — TELEPHONE ENCOUNTER
Last OV: 12/4/23  Next OV: X due in 4 weeks  Last refill: 12/4/23  Most recent Labs: 10/17/23  Last EKG (if needed): 12/4/23    Patient is to return in 4 weeks, refill was just sent in for #90 day.

## 2023-12-14 RX ORDER — VALSARTAN AND HYDROCHLOROTHIAZIDE 160; 12.5 MG/1; MG/1
1 TABLET, FILM COATED ORAL DAILY
Qty: 90 TABLET | Refills: 1 | Status: SHIPPED | OUTPATIENT
Start: 2023-12-14

## 2023-12-26 RX ORDER — AMIODARONE HYDROCHLORIDE 200 MG/1
TABLET ORAL
Qty: 90 TABLET | Refills: 0 | OUTPATIENT
Start: 2023-12-26

## 2023-12-28 ENCOUNTER — TELEPHONE (OUTPATIENT)
Dept: CARDIOLOGY CLINIC | Age: 63
End: 2023-12-28

## 2023-12-28 RX ORDER — AMIODARONE HYDROCHLORIDE 200 MG/1
TABLET ORAL
Qty: 90 TABLET | Refills: 0 | OUTPATIENT
Start: 2023-12-28

## 2023-12-28 RX ORDER — AMIODARONE HYDROCHLORIDE 200 MG/1
200 TABLET ORAL DAILY
Qty: 90 TABLET | Refills: 0 | Status: SHIPPED | OUTPATIENT
Start: 2023-12-28

## 2023-12-28 NOTE — TELEPHONE ENCOUNTER
Brenda is calling in for a refill on  amiodarone 200 mg. She states we refused the refill and she is now out.  Please advise.  Colten Cristobal

## 2023-12-28 NOTE — TELEPHONE ENCOUNTER
Is it okay to send a refill ? Patient is on the cardio scheduled for EKG on 1/05/2023      Brenda is calling in for a refill on  amiodarone 200 mg. She states we refused the refill and she is now out.  Please advise.  Colten Cristobal

## 2023-12-29 NOTE — TELEPHONE ENCOUNTER
Called patient left message her refill for amiodarone has been sent into her [facility] pharmacy. If any questions please give us a call back.

## 2024-01-05 ENCOUNTER — TELEPHONE (OUTPATIENT)
Dept: CARDIOLOGY CLINIC | Age: 64
End: 2024-01-05

## 2024-01-05 ENCOUNTER — NURSE ONLY (OUTPATIENT)
Dept: CARDIOLOGY CLINIC | Age: 64
End: 2024-01-05
Payer: COMMERCIAL

## 2024-01-05 VITALS
WEIGHT: 148 LBS | OXYGEN SATURATION: 98 % | HEIGHT: 64 IN | HEART RATE: 104 BPM | SYSTOLIC BLOOD PRESSURE: 108 MMHG | BODY MASS INDEX: 25.27 KG/M2 | DIASTOLIC BLOOD PRESSURE: 70 MMHG

## 2024-01-05 DIAGNOSIS — I48.19 PERSISTENT ATRIAL FIBRILLATION (HCC): Primary | ICD-10-CM

## 2024-01-05 PROCEDURE — 93000 ELECTROCARDIOGRAM COMPLETE: CPT | Performed by: INTERNAL MEDICINE

## 2024-01-05 NOTE — TELEPHONE ENCOUNTER
Brenda had EKG today and she was still in A-fib.  Had her Cardioversion done 10/30/2023.  She is needing a 2 week f/u.  Where can I schedule this patient Time/Date?    Brenda can be reach [phone]

## 2024-01-05 NOTE — TELEPHONE ENCOUNTER
Ok to book on watchman spot on 01/17/2024.   Problem: Falls - Risk of:  Goal: Will remain free from falls  Description  Will remain free from falls  Outcome: Ongoing     Problem: Falls - Risk of:  Goal: Absence of physical injury  Description  Absence of physical injury  Outcome: Ongoing     Problem: Skin Integrity:  Goal: Will show no infection signs and symptoms  Description  Will show no infection signs and symptoms  Outcome: Ongoing     Problem: Skin Integrity:  Goal: Absence of new skin breakdown  Description  Absence of new skin breakdown  Outcome: Ongoing     Problem: Fluid Volume - Imbalance:  Goal: Absence of imbalanced fluid volume signs and symptoms  Description  Absence of imbalanced fluid volume signs and symptoms  Outcome: Ongoing     Problem: Sensory:  Goal: General experience of comfort will improve  Description  General experience of comfort will improve  Outcome: Ongoing     Problem: Urinary Elimination:  Goal: Signs and symptoms of infection will decrease  Description  Signs and symptoms of infection will decrease  Outcome: Ongoing     Problem: Urinary Elimination:  Goal: Complications related to the disease process, condition or treatment will be avoided or minimized  Description  Complications related to the disease process, condition or treatment will be avoided or minimized  Outcome: Ongoing     Problem: Pain:  Goal: Pain level will decrease  Description  Pain level will decrease  Outcome: Ongoing     Problem: Pain:  Goal: Control of acute pain  Description  Control of acute pain  Outcome: Ongoing

## 2024-01-10 DIAGNOSIS — I48.91 ATRIAL FIBRILLATION, UNSPECIFIED TYPE (HCC): ICD-10-CM

## 2024-01-10 RX ORDER — GLYBURIDE-METFORMIN HYDROCHLORIDE 5; 500 MG/1; MG/1
1 TABLET ORAL 2 TIMES DAILY
Qty: 30 TABLET | Refills: 3 | Status: SHIPPED | OUTPATIENT
Start: 2024-01-10

## 2024-01-10 NOTE — TELEPHONE ENCOUNTER
Pt is requesting refills for:    rivaroxaban (XARELTO) 20 MG TABS tablet     glyBURIDE-metFORMIN (GLUCOVANCE) 5-500 MG per tablet     Please send to [facility]    Thank you

## 2024-01-10 NOTE — TELEPHONE ENCOUNTER
Future Appointments   Date Time Provider Department Center   1/24/2024  7:45 AM Raul Flower MD [facility] [place] - [facility]         Last appt 10/17/23

## 2024-01-16 NOTE — PROGRESS NOTES
Cardiac Electrophysiology Consultation   Date: 1/17/2024   Reason for Consultation: .[email or web address]  Consult Requesting Physician: Raul Flower MD     Chief Complaint:   No chief complaint on file.     HPI: Suha Darby is a 63 y.o. patient with a history of atrial fibrillation, type II diabetes mellitus, mixed hyperlipidemia and essential hypertension    On 10/21/2021 she underwent atrial fibrillation (PVI, CAFE), left atrial flutter (roofl line, mitral isthmus) and right atrial flutter (CTI) ablation with Dr. Lutz. She was started on flecainide 50mg BID and metoprolol was decreased to 25mg BID. Flecainide was changed to Rythmol due to side effects  She was seen in office on 1/24/2022 by Irnee Esposito CNP for her 3 month post ablation follow up and EKG showed atrial fibrillation rate controlled at 96 bpm.    On 1/28/2022 she underwent successful external DC cardioversion of symptomatic, persistent left atrial flutter.    She was seen in office on 02/28/2022 by Dr. Lutz and EKG showed atrial fibrillation, rate  controlled at 91 bpm. Repeat ablation and cardioversion was discussed and she decided to proceed.    On 03/11/2022 she underwent successful external DC cardioversion of symptomatic, persistent atrial flutter.  She was scheduled for ablation but chose to cancel the procedure.    She was seen in office on 12/02/2023 by Abimael Zaragoza MD and EKG showed atrial flutter-fibrillation v-rate 89 bpm.    On 02/24/2023 she underwent successful external DC cardioversion of atrial fibrillation.     She was seen in office on 08/22/2023 by Abimael Zaragoza MD and EKG showed atrial flutter-fibrillation, v-rate 78 bpm.    Underwent atrial fibrillation and atypical atrial flutter ablation 10/5/23. Rhytmol switched to Amiodarone following. Noted to have pericarditis following procedure. Colchicine administered with improvement.     S/p successful cardioversion of atrial flutter 10/30/23    Seen in office for follow

## 2024-01-17 ENCOUNTER — OFFICE VISIT (OUTPATIENT)
Dept: CARDIOLOGY CLINIC | Age: 64
End: 2024-01-17
Payer: COMMERCIAL

## 2024-01-17 VITALS
BODY MASS INDEX: 24.75 KG/M2 | OXYGEN SATURATION: 97 % | HEIGHT: 64 IN | SYSTOLIC BLOOD PRESSURE: 106 MMHG | DIASTOLIC BLOOD PRESSURE: 72 MMHG | HEART RATE: 102 BPM | WEIGHT: 145 LBS

## 2024-01-17 DIAGNOSIS — I48.19 PERSISTENT ATRIAL FIBRILLATION (HCC): Primary | ICD-10-CM

## 2024-01-17 DIAGNOSIS — I10 ESSENTIAL HYPERTENSION: ICD-10-CM

## 2024-01-17 DIAGNOSIS — I48.4 ATYPICAL ATRIAL FLUTTER (HCC): ICD-10-CM

## 2024-01-17 DIAGNOSIS — I48.3 TYPICAL ATRIAL FLUTTER (HCC): ICD-10-CM

## 2024-01-17 DIAGNOSIS — Z79.01 CURRENT USE OF ANTICOAGULANT THERAPY: ICD-10-CM

## 2024-01-17 PROCEDURE — 93000 ELECTROCARDIOGRAM COMPLETE: CPT | Performed by: INTERNAL MEDICINE

## 2024-01-17 PROCEDURE — 99215 OFFICE O/P EST HI 40 MIN: CPT | Performed by: INTERNAL MEDICINE

## 2024-01-17 PROCEDURE — 3078F DIAST BP <80 MM HG: CPT | Performed by: INTERNAL MEDICINE

## 2024-01-17 PROCEDURE — 3074F SYST BP LT 130 MM HG: CPT | Performed by: INTERNAL MEDICINE

## 2024-01-17 NOTE — PROGRESS NOTES
Cardiac Electrophysiology Consultation   Date: 1/17/2024   Reason for Consultation:  atrial fibrillation/typical and atypical atrial flutter  Consult Requesting Physician: Raul Flower MD     Chief Complaint:   Chief Complaint   Patient presents with    Follow-up     Cardioversion F/U.  PT reports elevated HR.      HPI: Suha Darby is a 63 y.o. patient with a history of atrial fibrillation, type II diabetes mellitus, mixed hyperlipidemia and essential hypertension    On 10/21/2021 she underwent atrial fibrillation (PVI, CAFE), left atrial flutter (roofl line, mitral isthmus) and right atrial flutter (CTI) ablation with Dr. Lutz. She was started on flecainide 50mg BID and metoprolol was decreased to 25mg BID. Flecainide was changed to Rythmol due to side effects  She was seen in office on 1/24/2022 by Irene Esposito CNP for her 3 month post ablation follow up and EKG showed atrial fibrillation rate controlled at 96 bpm.    On 1/28/2022 she underwent successful external DC cardioversion of symptomatic, persistent left atrial flutter.    She was seen in office on 02/28/2022 by Dr. Lutz and EKG showed atrial fibrillation, rate  controlled at 91 bpm. Repeat ablation and cardioversion was discussed and she decided to proceed.    On 03/11/2022 she underwent successful external DC cardioversion of symptomatic, persistent atrial flutter.  She was scheduled for ablation but chose to cancel the procedure.    She was seen in office on 12/02/2023 by Abimael Zaragoza MD and EKG showed atrial flutter-fibrillation v-rate 89 bpm.    On 02/24/2023 she underwent successful external DC cardioversion of atrial fibrillation.     She was seen in office on 08/22/2023 by Abimael Zaragoza MD and EKG showed atrial flutter-fibrillation, v-rate 78 bpm.    She sought a second opinion and saw me. We discussed medical vs ablation as treatment options for atypical flutter and atrial fibrillation. She opted to proceed with

## 2024-01-17 NOTE — PATIENT INSTRUCTIONS
Continue Ranexa. Monitor heart rate. If HR consistently 120 bpm -130 bpm go up to 1000 mg twice daily.    We will call you in 2 weeks to check how heart rate is running.     Plan is to start sotalol after 4 weeks after discontinuing amiodarone. We will call you when it is time to start and schedule EKG checks.

## 2024-01-19 RX ORDER — GLYBURIDE-METFORMIN HYDROCHLORIDE 5; 500 MG/1; MG/1
1 TABLET ORAL 2 TIMES DAILY
Qty: 30 TABLET | Refills: 3 | Status: SHIPPED | OUTPATIENT
Start: 2024-01-19

## 2024-01-19 NOTE — TELEPHONE ENCOUNTER
Future Appointments   Date Time Provider Department Center   1/24/2024  7:45 AM Raul Flower MD [facility] [place] - [facility]   4/24/2024  1:00 PM Noah Bonilla MD [facility]         Last appt 10/17/23

## 2024-01-19 NOTE — TELEPHONE ENCOUNTER
Pt called, can you send in an Rx for a 90 day supply for her glyBURIDE-metFORMIN (GLUCOVANCE) 5-500 MG per tablet?    Previous Rx was only 30 tablets    Please send to [facility] on [address]    Thank you

## 2024-01-24 ENCOUNTER — OFFICE VISIT (OUTPATIENT)
Dept: INTERNAL MEDICINE CLINIC | Age: 64
End: 2024-01-24
Payer: COMMERCIAL

## 2024-01-24 VITALS
BODY MASS INDEX: 24.75 KG/M2 | SYSTOLIC BLOOD PRESSURE: 110 MMHG | HEIGHT: 64 IN | DIASTOLIC BLOOD PRESSURE: 65 MMHG | HEART RATE: 103 BPM | OXYGEN SATURATION: 95 % | WEIGHT: 145 LBS

## 2024-01-24 DIAGNOSIS — K21.9 GASTROESOPHAGEAL REFLUX DISEASE, UNSPECIFIED WHETHER ESOPHAGITIS PRESENT: ICD-10-CM

## 2024-01-24 DIAGNOSIS — I10 ESSENTIAL HYPERTENSION: ICD-10-CM

## 2024-01-24 DIAGNOSIS — I48.19 PERSISTENT ATRIAL FIBRILLATION (HCC): ICD-10-CM

## 2024-01-24 DIAGNOSIS — E78.2 MIXED HYPERLIPIDEMIA: ICD-10-CM

## 2024-01-24 DIAGNOSIS — E11.9 TYPE 2 DIABETES MELLITUS WITHOUT COMPLICATION, WITHOUT LONG-TERM CURRENT USE OF INSULIN (HCC): Primary | ICD-10-CM

## 2024-01-24 LAB — HBA1C MFR BLD: 5.3 %

## 2024-01-24 PROCEDURE — 3078F DIAST BP <80 MM HG: CPT | Performed by: INTERNAL MEDICINE

## 2024-01-24 PROCEDURE — 3074F SYST BP LT 130 MM HG: CPT | Performed by: INTERNAL MEDICINE

## 2024-01-24 PROCEDURE — 3044F HG A1C LEVEL LT 7.0%: CPT | Performed by: INTERNAL MEDICINE

## 2024-01-24 PROCEDURE — 99214 OFFICE O/P EST MOD 30 MIN: CPT | Performed by: INTERNAL MEDICINE

## 2024-01-24 PROCEDURE — 83036 HEMOGLOBIN GLYCOSYLATED A1C: CPT | Performed by: INTERNAL MEDICINE

## 2024-01-24 RX ORDER — APREMILAST 30 MG/1
1 TABLET, FILM COATED ORAL DAILY
Qty: 60 TABLET | Refills: 1 | Status: SHIPPED | OUTPATIENT
Start: 2024-01-24

## 2024-01-24 RX ORDER — ATORVASTATIN CALCIUM 40 MG/1
TABLET, FILM COATED ORAL
Qty: 90 TABLET | Refills: 1 | Status: SHIPPED | OUTPATIENT
Start: 2024-01-24

## 2024-01-24 ASSESSMENT — PATIENT HEALTH QUESTIONNAIRE - PHQ9
2. FEELING DOWN, DEPRESSED OR HOPELESS: 0
1. LITTLE INTEREST OR PLEASURE IN DOING THINGS: 2
SUM OF ALL RESPONSES TO PHQ QUESTIONS 1-9: 2
SUM OF ALL RESPONSES TO PHQ9 QUESTIONS 1 & 2: 2
SUM OF ALL RESPONSES TO PHQ QUESTIONS 1-9: 2

## 2024-01-24 ASSESSMENT — ENCOUNTER SYMPTOMS
SORE THROAT: 0
SHORTNESS OF BREATH: 0
ABDOMINAL PAIN: 0
BLOOD IN STOOL: 0
NAUSEA: 0

## 2024-01-24 NOTE — PROGRESS NOTES
angiotensin blockers, beta blockers and diuretics. The current treatment provides significant improvement. There are no compliance problems.        Review of Systems   Constitutional:  Negative for fatigue and fever.   HENT:  Negative for nosebleeds and sore throat.    Respiratory:  Negative for cough and shortness of breath.    Cardiovascular:  Negative for chest pain, palpitations and leg swelling.   Gastrointestinal:  Negative for abdominal pain, blood in stool, nausea and vomiting.   Neurological:  Negative for dizziness and weakness.          Objective   Physical Exam  Constitutional:       Appearance: Normal appearance.   HENT:      Head: Normocephalic and atraumatic.   Eyes:      General: No scleral icterus.     Conjunctiva/sclera: Conjunctivae normal.   Cardiovascular:      Rate and Rhythm: Tachycardia present.      Heart sounds: Normal heart sounds.   Pulmonary:      Effort: Pulmonary effort is normal.      Breath sounds: Normal breath sounds.   Musculoskeletal:         General: No swelling.   Skin:     General: Skin is warm and dry.   Neurological:      Mental Status: She is alert and oriented to person, place, and time. Mental status is at baseline.   Psychiatric:         Mood and Affect: Mood normal.         Behavior: Behavior normal.              --JOAQUIM WHITE MD

## 2024-01-24 NOTE — TELEPHONE ENCOUNTER
Future Appointments   Date Time Provider Department Center   4/24/2024  1:00 PM Noah Bonilla MD [facility]         Last appt 1/24/24

## 2024-01-30 ENCOUNTER — TELEPHONE (OUTPATIENT)
Dept: CARDIOLOGY CLINIC | Age: 64
End: 2024-01-30

## 2024-01-30 NOTE — TELEPHONE ENCOUNTER
Called and left message to return call to office to review heart rate monitoring as discussed in visit on 1/18/2024 with Dr. Bonilla.

## 2024-02-01 ENCOUNTER — TELEPHONE (OUTPATIENT)
Dept: CARDIOLOGY CLINIC | Age: 64
End: 2024-02-01

## 2024-02-01 DIAGNOSIS — I48.19 PERSISTENT ATRIAL FIBRILLATION (HCC): Primary | ICD-10-CM

## 2024-02-01 NOTE — TELEPHONE ENCOUNTER
Dr. Bonilla    I spoke with Brenda she is 2 weeks out from stopping the amiodarone. She reports that her heart rate is averaging 103-104 bpm and never above 111 bpm. She was seen in office 1/17/2024 and per office note:    We will stop amiodarone today. Continue ranexa and metoprolol. In 2 weeks we will call her to assess heart rate then adjust medication accordingly. In 4 weeks, with amiodarone washed out, we will start sotalol 80 mg bid with daily ECG and on the third day possible DCCV if still in atrial fib/flutter.     Check BMP prior to sotalol  Continue Ranexa. Monitor heart rate. If HR consistently 120 bpm -130 bpm go up to 1000 mg twice daily.    Do you want to changed any medication dosage at this time or continue the course?    Please advise     Thank you

## 2024-02-06 ENCOUNTER — TELEPHONE (OUTPATIENT)
Dept: CARDIOLOGY CLINIC | Age: 64
End: 2024-02-06

## 2024-02-06 ENCOUNTER — TELEPHONE (OUTPATIENT)
Dept: INTERNAL MEDICINE CLINIC | Age: 64
End: 2024-02-06

## 2024-02-06 NOTE — TELEPHONE ENCOUNTER
Unable to fax [employer] claim as the fax always rings busy.   Scanned the paperwork into pt's chart.  Please review as needed.

## 2024-02-06 NOTE — TELEPHONE ENCOUNTER
Blood sugars are reading low,   63 today, couple of days ago 73, she is not feeling very well .  She is watching her diet.      She did make a appointment for Wednesday

## 2024-02-07 ENCOUNTER — TELEPHONE (OUTPATIENT)
Dept: INTERNAL MEDICINE CLINIC | Age: 64
End: 2024-02-07

## 2024-02-07 ENCOUNTER — OFFICE VISIT (OUTPATIENT)
Dept: INTERNAL MEDICINE CLINIC | Age: 64
End: 2024-02-07
Payer: COMMERCIAL

## 2024-02-07 VITALS
HEIGHT: 64 IN | HEART RATE: 108 BPM | SYSTOLIC BLOOD PRESSURE: 120 MMHG | DIASTOLIC BLOOD PRESSURE: 65 MMHG | BODY MASS INDEX: 24.07 KG/M2 | OXYGEN SATURATION: 98 % | WEIGHT: 141 LBS

## 2024-02-07 DIAGNOSIS — E11.9 TYPE 2 DIABETES MELLITUS WITHOUT COMPLICATION, WITHOUT LONG-TERM CURRENT USE OF INSULIN (HCC): Primary | ICD-10-CM

## 2024-02-07 DIAGNOSIS — I10 ESSENTIAL HYPERTENSION: ICD-10-CM

## 2024-02-07 PROCEDURE — 3078F DIAST BP <80 MM HG: CPT | Performed by: INTERNAL MEDICINE

## 2024-02-07 PROCEDURE — 99214 OFFICE O/P EST MOD 30 MIN: CPT | Performed by: INTERNAL MEDICINE

## 2024-02-07 PROCEDURE — 3074F SYST BP LT 130 MM HG: CPT | Performed by: INTERNAL MEDICINE

## 2024-02-07 PROCEDURE — 3044F HG A1C LEVEL LT 7.0%: CPT | Performed by: INTERNAL MEDICINE

## 2024-02-07 RX ORDER — APREMILAST 30 MG/1
1 TABLET, FILM COATED ORAL DAILY
Qty: 60 TABLET | Refills: 1 | Status: CANCELLED | OUTPATIENT
Start: 2024-02-07

## 2024-02-07 ASSESSMENT — ENCOUNTER SYMPTOMS
SHORTNESS OF BREATH: 0
ABDOMINAL PAIN: 0
BLOOD IN STOOL: 0
VOMITING: 0
NAUSEA: 0
SORE THROAT: 0
COUGH: 0
BLURRED VISION: 1

## 2024-02-07 NOTE — PROGRESS NOTES
Suha Darby (:  1960) is a 63 y.o. female, Established patient, here for evaluation of the following chief complaint(s):  Eye Problem (Seeing double over the weekend. Blood sugars have been low in the mornings.) and Diabetes         ASSESSMENT/PLAN:  1. Type 2 diabetes mellitus without complication, without long-term current use of insulin (HCC)  Patient is having hypoglycemic symptoms in the past few weeks.  Her blood sugars are running less than 60.  Patient has cut down on eating and is eating only 2 meals per day.  Patient had blurred vision few days ago when she was having hypoglycemia.  Advised patient to stop glyburide since it can cause hypoglycemia as.  However patient wants to continue glyburide along with metformin since taking only metformin causes her diarrhea.  Advised patient to take glyburide/metformin only once a day instead of twice a day and continue monitoring her sugars.  If her blood sugars are still running low advised her to stop taking glyburide/metformin.  Continue low carbohydrate diet and regular exercise  Advised patient to eat a snack while she is having symptoms of hypoglycemia to prevent loss of consciousness.  Patient is following with an ophthalmologist for regular eye checkups.    2. Essential hypertension  - Stable   - Continue current dose of metoprolol, valsartan-hydrochlorothiazide    Return in about 3 months (around 2024).         Subjective   SUBJECTIVE/OBJECTIVE:  Eye Problem   Associated symptoms include blurred vision. Pertinent negatives include no fever, nausea, vomiting or weakness.   Diabetes  She presents for her follow-up diabetic visit. She has type 2 diabetes mellitus. Her disease course has been improving. Hypoglycemia symptoms include hunger. Pertinent negatives for hypoglycemia include no dizziness. Associated symptoms include blurred vision. Pertinent negatives for diabetes include no chest pain, no fatigue and no weakness. Current diabetic

## 2024-02-08 ENCOUNTER — TELEPHONE (OUTPATIENT)
Dept: ADMINISTRATIVE | Age: 64
End: 2024-02-08

## 2024-02-08 RX ORDER — SOTALOL HYDROCHLORIDE 80 MG/1
80 TABLET ORAL 2 TIMES DAILY
Qty: 60 TABLET | Refills: 0 | Status: SHIPPED | OUTPATIENT
Start: 2024-02-21

## 2024-02-08 NOTE — TELEPHONE ENCOUNTER
Spoke with patient verbalized understanding of continued plan per Dr. Bonilla. Scheduled BMP for next week prior to starting Sotalol and scheduled to start Sotalol 80 mg BID 2/21/2024 with daily EKG at 3pm starting 2/21/2024-2/23/2024.

## 2024-02-08 NOTE — TELEPHONE ENCOUNTER
Submitted PA for Apremilast (OTEZLA) 30 MG TABS     Via CM (Key: [id number]) STATUS: PENDING.    Follow up done daily; if no decision with in three days we will refax.  If another three days goes by with no decision will call the insurance for status.

## 2024-02-08 NOTE — TELEPHONE ENCOUNTER
Called left voicemail to confirm continued plan.    Per office note 1/17/2024:  We will stop amiodarone today. Continue ranexa and metoprolol. In 2 weeks we will call her to assess heart rate then adjust medication accordingly. In 4 weeks, with amiodarone washed out, we will start sotalol 80 mg bid with daily ECG and on the third day possible DCCV if still in atrial fib/flutter

## 2024-02-09 ENCOUNTER — TELEPHONE (OUTPATIENT)
Dept: INTERNAL MEDICINE CLINIC | Age: 64
End: 2024-02-09

## 2024-02-09 NOTE — TELEPHONE ENCOUNTER
Pharmacy is requesting an alterative for Glyburide metformin 5-500   This medication is not in pts med list    Suggested alterative Glipizide metformin 5-500

## 2024-02-09 NOTE — TELEPHONE ENCOUNTER
Future Appointments   Date Time Provider Department Center   2/21/2024  3:00 PM SCHEDULE, I CARDIO [facility]   2/22/2024  3:00 PM SCHEDULE, [facility] CARDIO I [facility]   2/23/2024  3:00 PM SCHEDULE, [facility] CARDIO I [facility]   4/24/2024  1:00 PM Noah Bonilla MD [facility]   5/8/2024  7:45 AM Raul Flower MD [facility] [place] [facility] [place]     Last appointment 2/7/24

## 2024-02-09 NOTE — TELEPHONE ENCOUNTER
Please advise patient to take metformin  mg daily instead of glyburide-metformin since she is getting hypoglycemic episodes. I can send a new Rx for metformin  mg to her pharmacy if patient is willing to switch.

## 2024-02-09 NOTE — TELEPHONE ENCOUNTER
Prior Authorization is NOT required for this med.     If this requires a response please respond to the pool ( P MHCX PSC MEDICATION PRE-AUTH).      Thank you please advise patient.

## 2024-02-16 DIAGNOSIS — I48.19 PERSISTENT ATRIAL FIBRILLATION (HCC): ICD-10-CM

## 2024-02-16 LAB
ANION GAP SERPL CALCULATED.3IONS-SCNC: 11 MMOL/L (ref 3–16)
BUN SERPL-MCNC: 22 MG/DL (ref 7–20)
CALCIUM SERPL-MCNC: 9.4 MG/DL (ref 8.3–10.6)
CHLORIDE SERPL-SCNC: 98 MMOL/L (ref 99–110)
CO2 SERPL-SCNC: 27 MMOL/L (ref 21–32)
CREAT SERPL-MCNC: 1.2 MG/DL (ref 0.6–1.2)
GFR SERPLBLD CREATININE-BSD FMLA CKD-EPI: 51 ML/MIN/{1.73_M2}
GLUCOSE SERPL-MCNC: 233 MG/DL (ref 70–99)
POTASSIUM SERPL-SCNC: 4 MMOL/L (ref 3.5–5.1)
SODIUM SERPL-SCNC: 136 MMOL/L (ref 136–145)

## 2024-02-21 ENCOUNTER — NURSE ONLY (OUTPATIENT)
Dept: CARDIOLOGY CLINIC | Age: 64
End: 2024-02-21
Payer: COMMERCIAL

## 2024-02-21 VITALS — SYSTOLIC BLOOD PRESSURE: 116 MMHG | HEART RATE: 104 BPM | DIASTOLIC BLOOD PRESSURE: 60 MMHG | OXYGEN SATURATION: 97 %

## 2024-02-21 DIAGNOSIS — R00.0 TACHYCARDIA: Primary | ICD-10-CM

## 2024-02-21 PROCEDURE — 93000 ELECTROCARDIOGRAM COMPLETE: CPT | Performed by: INTERNAL MEDICINE

## 2024-02-21 RX ORDER — GLIPIZIDE AND METFORMIN HCL 2.5; 5 MG/1; MG/1
1 TABLET, FILM COATED ORAL EVERY MORNING
COMMUNITY

## 2024-02-21 NOTE — PROGRESS NOTES
Dr. Bonilla reviewed EKG.  He said that patient is good to go.  She should come back Thursday and Friday for repeat EKG each day.

## 2024-02-22 ENCOUNTER — NURSE ONLY (OUTPATIENT)
Dept: CARDIOLOGY CLINIC | Age: 64
End: 2024-02-22
Payer: COMMERCIAL

## 2024-02-22 ENCOUNTER — TELEPHONE (OUTPATIENT)
Dept: CARDIOLOGY CLINIC | Age: 64
End: 2024-02-22

## 2024-02-22 VITALS
SYSTOLIC BLOOD PRESSURE: 114 MMHG | BODY MASS INDEX: 24.59 KG/M2 | WEIGHT: 144 LBS | OXYGEN SATURATION: 98 % | HEART RATE: 100 BPM | HEIGHT: 64 IN | DIASTOLIC BLOOD PRESSURE: 70 MMHG

## 2024-02-22 DIAGNOSIS — I10 ESSENTIAL HYPERTENSION: Primary | Chronic | ICD-10-CM

## 2024-02-22 DIAGNOSIS — I48.19 PERSISTENT ATRIAL FIBRILLATION (HCC): Primary | ICD-10-CM

## 2024-02-22 DIAGNOSIS — I48.19 PERSISTENT ATRIAL FIBRILLATION (HCC): ICD-10-CM

## 2024-02-22 PROCEDURE — 93000 ELECTROCARDIOGRAM COMPLETE: CPT | Performed by: INTERNAL MEDICINE

## 2024-02-22 NOTE — TELEPHONE ENCOUNTER
Please reach out to patient and schedule Cardioversion with Dr. Bonilla next Friday if possible.       Cardioversion Pre Procedure Instructions     Date:____________________________    Arrive at:_________________________    Procedure time:_____________________      The morning of your procedure you will park in the [facility] parking lot and report directly to the cath lab to check in.   At the information desk stay right and go all the way to the end of the tao, this will take you directly to your check in desk for the cath lab.    Pre-Procedure Instructions   Do not eat or drink anything after midnight the day of your procedure.   Take your Xarelto as prescribed making sure not to miss any doses prior to the procedure.  Take your Sotalol the morning of the proceure  You will need to hold all diabetic medications including, Glipizide/Metformin the morning of the procedure.  If you take Lantus/Levemir only take ½ your normal dose the evening before.  All other medications can be taken in the morning with sips of water.   Do not use any lotions, creams or perfume the morning of procedure.   Pre-procedure lab work will need to be completed 5-7 days prior to procedure.  Please have a responsible adult to drive you home after procedure. It is recommended you do not drive for 24 hours after procedure and that someone stay with you for precautionary measures.   Cath lab will provide you with all post procedure instructions.                                                 [facility]     [facility]  [address]  [address]   [place] [address]  Phone: [phone]  The hours are Mon -Fri.  6:30 am - 4:00 pm   Saturday 8:00 am - noon  No appointment necessary

## 2024-02-22 NOTE — PROGRESS NOTES
Patient into office for EKG.  Reviewed by Dr. Bonilla.  Orders received to set up for a CV tomorrow AM after dose  of Sotalol.  Patient stated that she was agreeable to do a CV but could not do tomorrow.   Request Friday next week.  Message sent to Dr. Bonilla notifying of patient's response and request.

## 2024-02-23 ENCOUNTER — NURSE ONLY (OUTPATIENT)
Dept: CARDIOLOGY CLINIC | Age: 64
End: 2024-02-23
Payer: COMMERCIAL

## 2024-02-23 VITALS
HEART RATE: 102 BPM | SYSTOLIC BLOOD PRESSURE: 120 MMHG | HEIGHT: 64 IN | BODY MASS INDEX: 24.72 KG/M2 | OXYGEN SATURATION: 98 % | DIASTOLIC BLOOD PRESSURE: 78 MMHG

## 2024-02-23 DIAGNOSIS — I48.19 PERSISTENT ATRIAL FIBRILLATION (HCC): Primary | ICD-10-CM

## 2024-02-23 DIAGNOSIS — I48.19 PERSISTENT ATRIAL FIBRILLATION (HCC): ICD-10-CM

## 2024-02-23 LAB
ANION GAP SERPL CALCULATED.3IONS-SCNC: 12 MMOL/L (ref 3–16)
BUN SERPL-MCNC: 21 MG/DL (ref 7–20)
CALCIUM SERPL-MCNC: 9.7 MG/DL (ref 8.3–10.6)
CHLORIDE SERPL-SCNC: 104 MMOL/L (ref 99–110)
CO2 SERPL-SCNC: 25 MMOL/L (ref 21–32)
CREAT SERPL-MCNC: 0.9 MG/DL (ref 0.6–1.2)
GFR SERPLBLD CREATININE-BSD FMLA CKD-EPI: >60 ML/MIN/{1.73_M2}
GLUCOSE SERPL-MCNC: 130 MG/DL (ref 70–99)
POTASSIUM SERPL-SCNC: 4.4 MMOL/L (ref 3.5–5.1)
SODIUM SERPL-SCNC: 141 MMOL/L (ref 136–145)

## 2024-02-23 PROCEDURE — 93000 ELECTROCARDIOGRAM COMPLETE: CPT | Performed by: INTERNAL MEDICINE

## 2024-02-23 NOTE — TELEPHONE ENCOUNTER
Spoke with the pt and got her scheduled for procedure. We went over instructions below and she verbalized understanding.     Cardioversion Pre Procedure Instructions      Date: 3/1/24    Arrive at: 6:30 am   Procedure time: 7:30 am         The morning of your procedure you will park in the [facility] parking lot and report directly to the cath lab to check in.   At the information desk stay right and go all the way to the end of the tao, this will take you directly to your check in desk for the cath lab.     Pre-Procedure Instructions   Do not eat or drink anything after midnight the day of your procedure.   Take your Xarelto as prescribed making sure not to miss any doses prior to the procedure.  Take your Sotalol the morning of the proceure  You will need to hold all diabetic medications including, Glipizide/Metformin the morning of the procedure.  If you take Lantus/Levemir only take ½ your normal dose the evening before.  All other medications can be taken in the morning with sips of water.   Do not use any lotions, creams or perfume the morning of procedure.   Pre-procedure lab work will need to be completed 5-7 days prior to procedure.  Please have a responsible adult to drive you home after procedure. It is recommended you do not drive for 24 hours after procedure and that someone stay with you for precautionary measures.   Cath lab will provide you with all post procedure instructions.                                                     [facility]     [facility]  [address]  [address]   [place] [address]  Phone: [phone]  The hours are Mon -Fri.  6:30 am - 4:00 pm   Saturday 8:00 am - noon  No appointment necessary            Qgenda updated / emailed cath lab

## 2024-02-23 NOTE — PROGRESS NOTES
Per Dr. Bonilla and MAXIMILIAN Moss advised PT to continue sotalol medication and keep cardioversion scheduled as planned.  PT V/U.

## 2024-02-26 RX ORDER — RANOLAZINE 500 MG/1
500 TABLET, EXTENDED RELEASE ORAL 2 TIMES DAILY
Qty: 60 TABLET | Refills: 3 | OUTPATIENT
Start: 2024-02-26

## 2024-02-26 NOTE — TELEPHONE ENCOUNTER
Future Appointments   Date Time Provider Department Center   3/1/2024  7:30 AM [facility] CARDIAC CATH LAB PRE POST 1 [facility] CATH [facility]   4/24/2024  1:00 PM Noah Bonilla MD [facility]   5/8/2024  7:45 AM Raul Flower MD [facility] [place]       Last appt 2/7/24

## 2024-02-26 NOTE — TELEPHONE ENCOUNTER
This medication is prescribed by patient's cardiologist, please advise patient to request her cardiologist office to refill this medication.

## 2024-03-01 ENCOUNTER — HOSPITAL ENCOUNTER (OUTPATIENT)
Dept: CARDIAC CATH/INVASIVE PROCEDURES | Age: 64
Discharge: HOME | End: 2024-03-01
Payer: COMMERCIAL

## 2024-03-01 VITALS
RESPIRATION RATE: 17 BRPM | TEMPERATURE: 97.3 F | BODY MASS INDEX: 24.75 KG/M2 | SYSTOLIC BLOOD PRESSURE: 91 MMHG | OXYGEN SATURATION: 100 % | DIASTOLIC BLOOD PRESSURE: 54 MMHG | HEIGHT: 64 IN | WEIGHT: 145 LBS | HEART RATE: 63 BPM

## 2024-03-01 PROBLEM — I48.4 ATYPICAL ATRIAL FLUTTER (HCC): Status: ACTIVE | Noted: 2024-03-01

## 2024-03-01 LAB
EKG ATRIAL RATE: 64 BPM
EKG DIAGNOSIS: NORMAL
EKG P AXIS: 46 DEGREES
EKG P-R INTERVAL: 180 MS
EKG Q-T INTERVAL: 424 MS
EKG QRS DURATION: 94 MS
EKG QTC CALCULATION (BAZETT): 437 MS
EKG R AXIS: 9 DEGREES
EKG T AXIS: 54 DEGREES
EKG VENTRICULAR RATE: 64 BPM

## 2024-03-01 PROCEDURE — 92960 CARDIOVERSION ELECTRIC EXT: CPT

## 2024-03-01 PROCEDURE — 2580000003 HC RX 258: Performed by: INTERNAL MEDICINE

## 2024-03-01 PROCEDURE — 2580000003 HC RX 258

## 2024-03-01 PROCEDURE — 2500000003 HC RX 250 WO HCPCS

## 2024-03-01 RX ORDER — SODIUM CHLORIDE 0.9 % (FLUSH) 0.9 %
5-40 SYRINGE (ML) INJECTION PRN
Status: DISCONTINUED | OUTPATIENT
Start: 2024-03-01 | End: 2024-03-02 | Stop reason: HOSPADM

## 2024-03-01 RX ORDER — SODIUM CHLORIDE 0.9 % (FLUSH) 0.9 %
5-40 SYRINGE (ML) INJECTION EVERY 12 HOURS SCHEDULED
Status: DISCONTINUED | OUTPATIENT
Start: 2024-03-01 | End: 2024-03-02 | Stop reason: HOSPADM

## 2024-03-01 RX ORDER — SODIUM CHLORIDE 9 MG/ML
INJECTION, SOLUTION INTRAVENOUS PRN
Status: DISCONTINUED | OUTPATIENT
Start: 2024-03-01 | End: 2024-03-02 | Stop reason: HOSPADM

## 2024-03-01 RX ADMIN — SODIUM CHLORIDE: 9 INJECTION, SOLUTION INTRAVENOUS at 07:08

## 2024-03-01 NOTE — H&P
Successful external DC cardioversion of symptomatic, persistent atrial flutter. 03/11/2022  4. Successful external DC cardioversion of atrial fibrillation 02/24/2023.  5. Atrial fibrillation and atypical atrial flutter ablation 10/5/23  6. Successful cardioversion of atrial flutter 10/30/23      Assessment/Plan:     Atrial fibrillation / Typical and Atypical atrial flutter  - First seen on EKG 3/25/2013.   - Symptomatic with fatigue.   - EKG today shows 2:1 atrial tachycardia, nonespecific ST-T wave changes.    - s/p RFA of atrial fibrillation and left atrial flutter 10/21/2021 with Dr. Lutz.   - s/p AF and atypical AFL ablation 10/5/23 by myself.  - She has a [id number] Score 3 (HTN, DM, GENDER)  - Continue Xarelto 20 mg QD thromboembolic risk reduction.  - Tolerating well no signs or symptoms of abnormal bruising or bleeding  - Continue Metoprolol tartrate 25 mg QD and Ranexa 500 mg BID. Will call after 2 weeks to check heart rate. Depending on rate may consider stopping metoprolol.    Ms. Darby has recurrence of most likely atypical atrial flutter/AT after ablations x2. This is despite current amiodarone dose and repeat DCCV. She also had ranexa added. I discussed a trial of different AAD and AVN ablation with PPM as the last result.     We will stop amiodarone today. Continue ranexa and metoprolol. In 2 weeks we will call her to assess heart rate then adjust medication accordingly. In 4 weeks, with amiodarone washed out, we will start sotalol 80 mg bid with daily ECG and on the third day possible DCCV if still in atrial fib/flutter.    Check BMP prior to sotalol  Continue Ranexa. Monitor heart rate. If HR consistently 120 bpm -130 bpm go up to 1000 mg twice daily.    Sotalol was started and she came for ECG checks which showed no significant Qtc changes. She is here for DCCV.     Type 2 diabetes mellitus  -Continue current medical management.  -Hb A1c 6.2 (10/7/23)    Essential hypertension  - Controlled.

## 2024-03-01 NOTE — PROGRESS NOTES
CATH LAB PROCEDURE LOG - CARDIOVERSION      PRE PROCEDURE    DATE: 3/1/2024 ARRIVAL TO CATH LAB: 6:46 AM    ID & ALLERGY BAND: On    CONSENT: Yes    NPO SINCE: Midnight      Pt arrived to Cath Lab.   Plan of Care: Hemodynamics and cardiac rhythm will remain stable. Comfort level will be maintained. Respiratory function will remain adequate. Pt/family will verbalize understanding of the procedure. Procedure will be tolerated without complications. Patient will recover from procedure without complications.   ID armband on patient and identification verified.   Informed consent obtained.   Non invasive blood pressure cuff applied, monitoring initiated.   EKG pads and pulse oximeter applied, monitoring initiated.   Instructions given. Patient and / or family verbalize understanding.   H&P will be documented by physician in Epic.   Pt has been NPO since midnight.     Post DCCV EKG ordered? Yes    Pre CV Rhythm: Atrial Flutter  0705 Dr. Bonilla speaking with patient and family  0709:  O2 applied at 3lpm  CARDIOVERSION    Timeout and fire safety completed.    TIMEOUT TIME: 7:23 AM    CORRECT PATIENT VERIFIED. MEMBERS OF THE SURGICAL TEAM/VISITORS INTRODUCED. ALLERGIES ANNOUNCED. CORRECT PROCEDURE VERIFIED. CORRECT PROCEDURAL SITE VERIFIED. CONSENTS VERIFIED. IMPLANT EQUIPMENT, ADDITIONAL SERVICES, SPECIAL REQUIREMENTS AVAILABLE. MEDICATIONS LABELED AND AVAILABLE. APPROPRIATE PRE MEDS HAVE BEEN ADMINISTERED.    FIRE SAFETY: ALCOHOL PREP SOLUTION HAD SUFFICIENT TIME TO DISSIPATE IF USED. FIRE SAFETY: SURGICAL SITE OR INCISION ABOVE THE XIPHOID. YES=1, NO=0. FIRE SAFETY: OPEN OXYGEN SOURCE. YES=1, NO=0. FIRE SAFETY: AVAILABLE IGNITION SOURCE. YES=1, NO=0. FIRE SAFETY: SCORE TOTAL = 1.     Procedure Medication:     7:23 AM - Brevital 50 mg IV given by Dr Bonilla    Synchronized Cardioversion performed at 200 joules      Rhythm was converted to Normal Sinus Rhythm    0730  Pt waking up, respirations spontaneous, able to converse

## 2024-03-01 NOTE — FLOWSHEET NOTE
Patient set on side of stretcher.  Patient feels well, no complaints of dizziness or lightheadiness.  Patient's AVS reprinted and reviewed with her and her daughter to stop the metoprolol.  Patient getting dressed

## 2024-03-01 NOTE — PROCEDURES
[facility]     Electrophysiology Procedure Note       Date of Procedure: 3/1/2024  Patient's Name: Suha Darby  YOB: 1960   Medical Record Number: [id number]  Procedure Performed by: Noah Bonilla MD    Procedures performed:  IV sedation.  External Electrical cardioversion   Mallampati I  ASA III    Indication of the procedure: Persistent Atrial flutter    Details of procedure:   The patient was brought to the cath lab area in a fasting and non-sedated state. The risks, benefits and alternatives of the procedure were discussed with the patient. The patient opted to proceed with the procedure. Written informed consent was signed and placed in the chart.  A timeout protocol was completed to identify the patient and the procedure being performed.    Patient is on chronic anticoagulation therapy.     I pushed 50 mg Brevital.    We monitored the patient's level of consciousness and vital signs/physiologic status throughout the procedure duration (see start and stop times below).  Sedation:  Brevital  Sedation start: 7:15 am  Sedation stop: 7:33 am     DC cardioversion was perfomred using 200J, synchronized shock. Patient was converted to sinus rhythm at 70s bpm. The patient tolerated the procedure well and there were no complications.     Conclusion:   Successful external DC cardioversion of persistent Atrial flutter    Plan:   The patient can be discharged if remains stable. Will continue with medical therapy.     Noah Bonilla MD,   Cardiac Electrophysiology  [place], [place] [id number] [phone]

## 2024-03-01 NOTE — FLOWSHEET NOTE
Discharge instructions reviewed with patient and family member.  Patient and family verbalized understanding.   All medication side effects reviewed and patient and family verbalized understanding. Follow up appointment(s) reviewed with patient and family.   Patient given discharge instructions, and appointment times.

## 2024-03-14 DIAGNOSIS — I10 ESSENTIAL HYPERTENSION: ICD-10-CM

## 2024-03-14 NOTE — TELEPHONE ENCOUNTER
[facility]    [address] [address] [place] [address]    Phone:  [phone]    Fax:  [phone]       Thank You for choosing us for your health care visit. We are glad to serve you and happy to provide you with this summary of your visit. Please help us to ensure we have accurate records. If you find anything that needs to be changed, please let our staff know as soon as possible.          Your Demographic Information     Patient Name Sex Dillon Macedo Female 2001       Ethnic Group Patient Race    Not of  or  Origin White      Your Visit Details     Date & Time Provider Department    3/27/2017 12:00 PM Tianna Flores LPC [facility]      Your Upcoming Appointment*(Max 10)     2017 11:00 AM CDT   Behavioral Health Extended Follow-Up with Tianna Flores LPC   [facility] ([facility])    [id number] [place] [id number] [phone]            Monday May 01, 2017 10:00 AM CDT   Behavioral Health Extended Follow-Up with Tianna Flores LPC   [facility] ([facility])    [address] [place] [id number] [phone]              Your Vitals Were     Smoking Status                   Never Smoker           Medications Prescribed or Re-Ordered Today     None      Your Current Medications Are        Disp Refills Start End    metroNIDAZOLE (FLAGYL) 500 MG tablet        Sig - Route: Take 500 mg by mouth 3 times daily. - Oral    Class: Historical Med    sulfamethoxazole-trimethoprim (BACTRIM DS,SEPTRA DS) 800-160 MG per tablet        Sig - Route: Take 1 tablet by mouth 2 times daily. - Oral    Class: Historical Med      Allergies     Penicillin G RASH      Immunizations History as of 3/27/2017     Name Date    DTaP 2006, 2003, 2002, 3/26/2002, 2002    HEPATITIS A CHILD 2013  1:34 PM    HIB 2003, 3/26/2002, 2002, 2001  Future Appointments   Date Time Provider Department Center   4/24/2024  1:00 PM Noah Bonilla MD [facility]   5/8/2024  7:45 AM Raul Flower MD [facility] [place] [facility]         Last appt 2/7/24      HPV QUAD 10/21/2013  7:46 AM, 8/13/2013  1:33 PM    Hepatitis B Child 9/19/2006, 7/2/2002, 2001    Influenza 11/13/2007    MMR 9/19/2006, 1/13/2003    Meningococcal Conjugate MCV4P (Menactra) 8/13/2013  1:32 PM    Polio, INACTIVATED 9/19/2006, 3/26/2002, 1/8/2002, 2001    Tdap 8/13/2013  1:31 PM    Varicella 8/30/2011, 2/25/2004      Problem List as of 3/27/2017     Unspecified constipation            Patient Instructions     None

## 2024-03-15 RX ORDER — VALSARTAN AND HYDROCHLOROTHIAZIDE 160; 12.5 MG/1; MG/1
1 TABLET, FILM COATED ORAL DAILY
Qty: 90 TABLET | Refills: 1 | Status: SHIPPED | OUTPATIENT
Start: 2024-03-15

## 2024-03-20 ENCOUNTER — TELEPHONE (OUTPATIENT)
Dept: ADMINISTRATIVE | Age: 64
End: 2024-03-20

## 2024-03-20 NOTE — TELEPHONE ENCOUNTER
Submitted PA for Otezla 30MG tablets  Via [facility] (Key: [id number]) STATUS: PENDING.    Follow up done daily; if no decision with in three days we will refax.  If another three days goes by with no decision will call the insurance for status.

## 2024-03-21 RX ORDER — SOTALOL HYDROCHLORIDE 80 MG/1
80 TABLET ORAL 2 TIMES DAILY
Qty: 60 TABLET | Refills: 0 | Status: SHIPPED | OUTPATIENT
Start: 2024-03-21

## 2024-03-21 NOTE — TELEPHONE ENCOUNTER
The medication was DENIED; DENIAL letter uploaded to MEDIA.    Current plan criteria does not allow coverage of the requested drug unless it is prescribed by or in consultation with a dermatologist.     If you want an APPEAL; please note in this encounter what new information you would like to APPEAL with.  Once complete route back to PA POOL.    If this requires a response please respond to the pool ( P MHCX PSC MEDICATION PRE-AUTH).      Thank you please advise patient.

## 2024-03-21 NOTE — TELEPHONE ENCOUNTER
Last O/V:  1/17/24  Next O/V:  4/24/24  Last Refill: 2/21/24  Last Labs:  Hepatic Panel:  10/12/23. BMP: 2/23/24  Last EKG: 3/1/24

## 2024-03-21 NOTE — TELEPHONE ENCOUNTER
Medication Refill    Medication needing refilled:  sotalol (BETAPACE)     Dosage of the medication:  80 MG tablet     How are you taking this medication (QD, BID, TID, QID, PRN):  Take 1 tablet by mouth 2 times daily     30 or 90 day supply called in:  30 day    When will you run out of your medication:  Today    Which Pharmacy are we sending the medication to?:  [facility] [id number] [place], [place] - [address] [phone]  F [phone]

## 2024-03-25 RX ORDER — RANOLAZINE 500 MG/1
500 TABLET, EXTENDED RELEASE ORAL 2 TIMES DAILY
Qty: 60 TABLET | Refills: 3 | Status: SHIPPED | OUTPATIENT
Start: 2024-03-25

## 2024-03-25 NOTE — TELEPHONE ENCOUNTER
Future Appointments   Date Time Provider Department Center   4/24/2024  1:00 PM Noah Bonilla MD [facility]   5/8/2024  7:45 AM Raul Flower MD [facility] [place] [facility] [place]       Last appt 2/7/24

## 2024-04-08 DIAGNOSIS — I48.91 ATRIAL FIBRILLATION, UNSPECIFIED TYPE (HCC): ICD-10-CM

## 2024-04-08 NOTE — TELEPHONE ENCOUNTER
Medication:   Requested Prescriptions     Pending Prescriptions Disp Refills    rivaroxaban (XARELTO) 20 MG TABS tablet [Pharmacy Med Name: XARELTO 20 MG TABLET] 90 tablet 3     Sig: TAKE ONE TABLET BY MOUTH DAILY       Last Filled:  1/10/24    Patient Phone Number: [phone] (home)     Last appt: 2/7/2024   Next appt: 5/8/2024  Future Appointments   Date Time Provider Department Center   4/24/2024  1:00 PM Noah Bonilla MD [facility]   5/8/2024  7:45 AM Raul Flower MD [facility]

## 2024-04-11 NOTE — PROGRESS NOTES
issues.  Her QTc is 459 ms.  Will continue the current medications and get renal function test every 6 months as well as EKG for QTc.    Type 2 diabetes mellitus  -Continue current medical management.  -Hb A1c 5.3 (01/24/2024)    Essential hypertension  - Controlled.   - Goal BP <130/80.   - Continue medical therapy.    Follow ups:  Follow up in 6 months     Thank you for allowing me to participate in the care of Suha Darby. All questions and concerns were addressed to the patient/family. Alternatives to my treatment were discussed.     This note was scribed in the presence of Dr. Noah Bonilla MD by Isa Mathur RN.    Physician attestation: The scribe's documentation has been prepared under my direction and has been personally reviewed by me in its entirety. I confirm that the note above reflects all work, treatment, procedures, and medical decision making performed by me.     Noah Bonilla MD  Cardiac Electrophysiology  [facility]

## 2024-04-12 RX ORDER — SOTALOL HYDROCHLORIDE 80 MG/1
80 TABLET ORAL 2 TIMES DAILY
Qty: 60 TABLET | Refills: 5 | Status: SHIPPED | OUTPATIENT
Start: 2024-04-12

## 2024-04-24 ENCOUNTER — OFFICE VISIT (OUTPATIENT)
Dept: CARDIOLOGY CLINIC | Age: 64
End: 2024-04-24
Payer: COMMERCIAL

## 2024-04-24 VITALS
WEIGHT: 145 LBS | SYSTOLIC BLOOD PRESSURE: 100 MMHG | OXYGEN SATURATION: 99 % | BODY MASS INDEX: 24.75 KG/M2 | DIASTOLIC BLOOD PRESSURE: 62 MMHG | HEIGHT: 64 IN | HEART RATE: 50 BPM

## 2024-04-24 DIAGNOSIS — I48.19 PERSISTENT ATRIAL FIBRILLATION (HCC): Primary | ICD-10-CM

## 2024-04-24 DIAGNOSIS — I48.3 TYPICAL ATRIAL FLUTTER (HCC): ICD-10-CM

## 2024-04-24 DIAGNOSIS — I48.4 ATYPICAL ATRIAL FLUTTER (HCC): ICD-10-CM

## 2024-04-24 DIAGNOSIS — I10 ESSENTIAL HYPERTENSION: ICD-10-CM

## 2024-04-24 PROCEDURE — 3078F DIAST BP <80 MM HG: CPT | Performed by: INTERNAL MEDICINE

## 2024-04-24 PROCEDURE — 3074F SYST BP LT 130 MM HG: CPT | Performed by: INTERNAL MEDICINE

## 2024-04-24 PROCEDURE — 93000 ELECTROCARDIOGRAM COMPLETE: CPT | Performed by: INTERNAL MEDICINE

## 2024-04-24 PROCEDURE — 99215 OFFICE O/P EST HI 40 MIN: CPT | Performed by: INTERNAL MEDICINE

## 2024-04-24 RX ORDER — GLYBURIDE-METFORMIN HYDROCHLORIDE 5; 500 MG/1; MG/1
1 TABLET ORAL DAILY
COMMUNITY
Start: 2024-02-19

## 2024-04-24 NOTE — PROGRESS NOTES
Julisa Leon (:  1960) is a 58 y.o. female, Established patient, here for evaluation of the following chief complaint(s):  Follow-up (6 month follow up) and Diabetes         ASSESSMENT/PLAN:  1. Type 2 diabetes mellitus without complication, without long-term current use of insulin (HCC)  - Stable   - Continue current dose of glyburide-metformin  -     POCT glycosylated hemoglobin (Hb A1C)    2. Essential hypertension  - Stable   - Continue current dose of metoprolol, valsartan-hydrochlorothiazide    3. Mixed hyperlipidemia  Uncontrolled  - Continue current dose of atorvastatin  Triglycerides elevated. Recommend following lifestyle modifications by having diet low in fats and sugars and exercising 4-5 times/week. 4. Persistent atrial fibrillation (HCC)  - Stable   - Continue current dose of Xarelto, metoprolol, takes propafenone prescribed by cardiology. Continue cardiology follow-up    Return in about 3 months (around 2023). Subjective   SUBJECTIVE/OBJECTIVE:  Diabetes  She presents for her follow-up diabetic visit. She has type 2 diabetes mellitus. Her disease course has been stable. Pertinent negatives for hypoglycemia include no dizziness. Pertinent negatives for diabetes include no chest pain, no fatigue and no weakness. There are no hypoglycemic complications. There are no diabetic complications. Risk factors for coronary artery disease include dyslipidemia, hypertension and diabetes mellitus. Current diabetic treatment includes oral agent (dual therapy). She is compliant with treatment all of the time. She is following a diabetic diet. She participates in exercise intermittently. An ACE inhibitor/angiotensin II receptor blocker is being taken. Hypertension  This is a chronic problem. The current episode started more than 1 year ago. The problem is controlled. Pertinent negatives include no chest pain, palpitations or shortness of breath.  Past treatments include angiotensin blockers, beta blockers and diuretics. The current treatment provides significant improvement. There are no compliance problems. Review of Systems   Constitutional:  Negative for fatigue and fever. HENT:  Negative for nosebleeds and sore throat. Respiratory:  Negative for cough and shortness of breath. Cardiovascular:  Negative for chest pain, palpitations and leg swelling. Gastrointestinal:  Negative for abdominal pain, blood in stool, nausea and vomiting. Neurological:  Negative for dizziness and weakness. Objective   Physical Exam  Constitutional:       Appearance: Normal appearance. HENT:      Head: Normocephalic and atraumatic. Eyes:      General: No scleral icterus. Conjunctiva/sclera: Conjunctivae normal.   Cardiovascular:      Rate and Rhythm: Normal rate and regular rhythm. Pulses: Normal pulses. Heart sounds: Normal heart sounds. Pulmonary:      Effort: Pulmonary effort is normal.      Breath sounds: Normal breath sounds. Musculoskeletal:         General: No swelling. Skin:     General: Skin is warm and dry. Neurological:      Mental Status: She is alert and oriented to person, place, and time. Mental status is at baseline.    Psychiatric:         Mood and Affect: Mood normal.         Behavior: Behavior normal.            --Brionna Stephens MD TELEMETRY

## 2024-05-02 ENCOUNTER — OFFICE VISIT (OUTPATIENT)
Dept: INTERNAL MEDICINE CLINIC | Age: 64
End: 2024-05-02
Payer: COMMERCIAL

## 2024-05-02 VITALS
SYSTOLIC BLOOD PRESSURE: 122 MMHG | BODY MASS INDEX: 25.15 KG/M2 | OXYGEN SATURATION: 99 % | WEIGHT: 146.6 LBS | DIASTOLIC BLOOD PRESSURE: 78 MMHG | HEART RATE: 68 BPM

## 2024-05-02 DIAGNOSIS — H93.12 TINNITUS OF LEFT EAR: ICD-10-CM

## 2024-05-02 DIAGNOSIS — H92.02 LEFT EAR PAIN: ICD-10-CM

## 2024-05-02 DIAGNOSIS — H65.192 ACUTE EFFUSION OF LEFT EAR: Primary | ICD-10-CM

## 2024-05-02 PROCEDURE — 3078F DIAST BP <80 MM HG: CPT | Performed by: STUDENT IN AN ORGANIZED HEALTH CARE EDUCATION/TRAINING PROGRAM

## 2024-05-02 PROCEDURE — 3074F SYST BP LT 130 MM HG: CPT | Performed by: STUDENT IN AN ORGANIZED HEALTH CARE EDUCATION/TRAINING PROGRAM

## 2024-05-02 PROCEDURE — 99214 OFFICE O/P EST MOD 30 MIN: CPT | Performed by: STUDENT IN AN ORGANIZED HEALTH CARE EDUCATION/TRAINING PROGRAM

## 2024-05-02 RX ORDER — CETIRIZINE HYDROCHLORIDE 10 MG/1
10 TABLET ORAL DAILY
Qty: 30 TABLET | Refills: 0 | Status: SHIPPED | OUTPATIENT
Start: 2024-05-02

## 2024-05-02 RX ORDER — AZITHROMYCIN 250 MG/1
TABLET, FILM COATED ORAL
Qty: 6 TABLET | Refills: 0 | Status: SHIPPED | OUTPATIENT
Start: 2024-05-02 | End: 2024-05-12

## 2024-05-02 SDOH — ECONOMIC STABILITY: HOUSING INSECURITY
IN THE LAST 12 MONTHS, WAS THERE A TIME WHEN YOU DID NOT HAVE A STEADY PLACE TO SLEEP OR SLEPT IN A SHELTER (INCLUDING NOW)?: NO

## 2024-05-02 SDOH — ECONOMIC STABILITY: FOOD INSECURITY: WITHIN THE PAST 12 MONTHS, THE FOOD YOU BOUGHT JUST DIDN'T LAST AND YOU DIDN'T HAVE MONEY TO GET MORE.: NEVER TRUE

## 2024-05-02 SDOH — ECONOMIC STABILITY: FOOD INSECURITY: WITHIN THE PAST 12 MONTHS, YOU WORRIED THAT YOUR FOOD WOULD RUN OUT BEFORE YOU GOT MONEY TO BUY MORE.: NEVER TRUE

## 2024-05-02 SDOH — ECONOMIC STABILITY: INCOME INSECURITY: HOW HARD IS IT FOR YOU TO PAY FOR THE VERY BASICS LIKE FOOD, HOUSING, MEDICAL CARE, AND HEATING?: NOT HARD AT ALL

## 2024-05-02 NOTE — PROGRESS NOTES
Suha Darby (:  1960) is a 63 y.o. female,Established patient, here for evaluation of the following chief complaint(s):  Otalgia (Left ear pain since traveling last month)      Assessment & Plan   1. Acute effusion of left ear worsening, continue with the nasal spray.  Start on Zyrtec and azithromycin.  Avoid instrumentation.  -     azithromycin (ZITHROMAX) 250 MG tablet; 500mg on day 1 followed by 250mg on days 2 - 5, Disp-6 tablet, R-0Normal  -     cetirizine (ZYRTEC) 10 MG tablet; Take 1 tablet by mouth daily, Disp-30 tablet, R-0Normal  2. Left ear pain, worsening, medication sent.  -     azithromycin (ZITHROMAX) 250 MG tablet; 500mg on day 1 followed by 250mg on days 2 - 5, Disp-6 tablet, R-0Normal  -     cetirizine (ZYRTEC) 10 MG tablet; Take 1 tablet by mouth daily, Disp-30 tablet, R-0Normal  3. Tinnitus of left ear worsening, started on medicine.  -     cetirizine (ZYRTEC) 10 MG tablet; Take 1 tablet by mouth daily, Disp-30 tablet, R-0Normal      Return if symptoms worsen or fail to improve.       Subjective   Reports of having left ear pain.  Started ~ a month ago after returning from [place] ().  Jusr left ear, no discharge/  More worse at night.  Little ringing, no dizziness, no instrumentation.  Little off balance, tripping over.  Tried OTC ear drops and cotton balls not working.  Planning to go to [facility] in .  Wears earplug at night- using the earplug from the plane.    Otalgia   There is pain in the left ear. The current episode started more than 1 month ago. The problem has been gradually worsening. There has been no fever. The pain is moderate. Associated symptoms include headaches (back) and neck pain. Pertinent negatives include no ear discharge or hearing loss. She has tried ear drops for the symptoms. The treatment provided no relief. There is no history of a chronic ear infection or hearing loss.       Review of Systems   HENT:  Positive for ear pain. Negative for ear

## 2024-06-10 DIAGNOSIS — I10 ESSENTIAL HYPERTENSION: ICD-10-CM

## 2024-06-10 RX ORDER — VALSARTAN AND HYDROCHLOROTHIAZIDE 160; 12.5 MG/1; MG/1
1 TABLET, FILM COATED ORAL DAILY
Qty: 90 TABLET | Refills: 1 | Status: SHIPPED | OUTPATIENT
Start: 2024-06-10

## 2024-06-10 NOTE — TELEPHONE ENCOUNTER
Medication:   Requested Prescriptions     Pending Prescriptions Disp Refills    valsartan-hydroCHLOROthiazide (DIOVAN-HCT) 160-12.5 MG per tablet [Pharmacy Med Name: VALSARTAN-HCTZ 160-12.5 MG TAB] 90 tablet 1     Sig: TAKE 1 TABLET BY MOUTH DAILY       Last Filled:  3.15.24    Patient Phone Number: [phone] (home)     Last appt: 5/2/2024   Next appt: Visit date not found  Future Appointments   Date Time Provider Department Center   10/25/2024 10:45 AM Noah Bonilla MD [facility]

## 2024-06-13 RX ORDER — SOTALOL HYDROCHLORIDE 80 MG/1
TABLET ORAL 2 TIMES DAILY
Qty: 60 TABLET | Refills: 5 | OUTPATIENT
Start: 2024-06-13

## 2024-06-28 RX ORDER — RANOLAZINE 500 MG/1
500 TABLET, EXTENDED RELEASE ORAL 2 TIMES DAILY
Qty: 180 TABLET | Refills: 0 | Status: SHIPPED | OUTPATIENT
Start: 2024-06-28

## 2024-06-28 NOTE — TELEPHONE ENCOUNTER
Future Appointments   Date Time Provider Department Center   7/26/2024 11:00 AM Fareed Shah MD [facility] [place] [facility] [place]   10/25/2024 10:45 AM Noah Bonilla MD [facility]       Last appt 5/2/24

## 2024-07-26 ENCOUNTER — OFFICE VISIT (OUTPATIENT)
Dept: INTERNAL MEDICINE CLINIC | Age: 64
End: 2024-07-26
Payer: COMMERCIAL

## 2024-07-26 VITALS
BODY MASS INDEX: 25.56 KG/M2 | WEIGHT: 149 LBS | DIASTOLIC BLOOD PRESSURE: 60 MMHG | HEART RATE: 62 BPM | SYSTOLIC BLOOD PRESSURE: 128 MMHG | OXYGEN SATURATION: 97 %

## 2024-07-26 DIAGNOSIS — I10 ESSENTIAL HYPERTENSION: Primary | Chronic | ICD-10-CM

## 2024-07-26 DIAGNOSIS — I48.19 PERSISTENT ATRIAL FIBRILLATION (HCC): ICD-10-CM

## 2024-07-26 DIAGNOSIS — M54.2 NECK PAIN: ICD-10-CM

## 2024-07-26 DIAGNOSIS — E78.2 MIXED HYPERLIPIDEMIA: ICD-10-CM

## 2024-07-26 DIAGNOSIS — E11.9 TYPE 2 DIABETES MELLITUS WITHOUT COMPLICATION, WITHOUT LONG-TERM CURRENT USE OF INSULIN (HCC): Chronic | ICD-10-CM

## 2024-07-26 PROCEDURE — 3044F HG A1C LEVEL LT 7.0%: CPT | Performed by: STUDENT IN AN ORGANIZED HEALTH CARE EDUCATION/TRAINING PROGRAM

## 2024-07-26 PROCEDURE — 99213 OFFICE O/P EST LOW 20 MIN: CPT | Performed by: STUDENT IN AN ORGANIZED HEALTH CARE EDUCATION/TRAINING PROGRAM

## 2024-07-26 PROCEDURE — 3078F DIAST BP <80 MM HG: CPT | Performed by: STUDENT IN AN ORGANIZED HEALTH CARE EDUCATION/TRAINING PROGRAM

## 2024-07-26 PROCEDURE — 3074F SYST BP LT 130 MM HG: CPT | Performed by: STUDENT IN AN ORGANIZED HEALTH CARE EDUCATION/TRAINING PROGRAM

## 2024-07-26 RX ORDER — VALSARTAN AND HYDROCHLOROTHIAZIDE 80; 12.5 MG/1; MG/1
1 TABLET, FILM COATED ORAL DAILY
Qty: 90 TABLET | Refills: 3 | Status: SHIPPED | OUTPATIENT
Start: 2024-07-26

## 2024-07-26 NOTE — PROGRESS NOTES
scleral icterus.     Pupils: Pupils are equal, round, and reactive to light.   Cardiovascular:      Rate and Rhythm: Normal rate and regular rhythm.      Pulses: Normal pulses.      Heart sounds: No murmur heard.     No gallop.   Pulmonary:      Effort: Pulmonary effort is normal. No respiratory distress.      Breath sounds: Normal breath sounds. No wheezing, rhonchi or rales.   Abdominal:      General: Abdomen is flat. Bowel sounds are normal. There is no distension.      Palpations: Abdomen is soft.      Tenderness: There is no abdominal tenderness.   Musculoskeletal:      Right lower leg: No edema.      Left lower leg: No edema.   Skin:     General: Skin is warm and dry.      Findings: No erythema or rash.   Neurological:      General: No focal deficit present.      Mental Status: She is alert and oriented to person, place, and time.   Psychiatric:         Mood and Affect: Mood normal.         Behavior: Behavior normal.          Assessment and Plan  Suharenea Darby presents to clinic for new to provider visit    Hypertension    Paroxysmal atrial fibrillation    T2DM      See visit diagnosis and associated orders below.  Problem List          Circulatory    Essential hypertension - Primary (Chronic)    Relevant Medications    atorvastatin (LIPITOR) 40 MG tablet    rivaroxaban (XARELTO) 20 MG TABS tablet    sotalol (BETAPACE) 80 MG tablet    ranolazine (RANEXA) 500 MG extended release tablet    valsartan-hydroCHLOROthiazide (DIOVAN-HCT) 80-12.5 MG per tablet    Other Relevant Orders    Comprehensive Metabolic Panel    Hemoglobin A1C    TSH with Reflex    CBC with Auto Differential    Persistent atrial fibrillation (HCC)    Relevant Medications    atorvastatin (LIPITOR) 40 MG tablet    rivaroxaban (XARELTO) 20 MG TABS tablet    sotalol (BETAPACE) 80 MG tablet    ranolazine (RANEXA) 500 MG extended release tablet    valsartan-hydroCHLOROthiazide (DIOVAN-HCT) 80-12.5 MG per tablet    Other Relevant Orders

## 2024-08-22 ENCOUNTER — OFFICE VISIT (OUTPATIENT)
Dept: ORTHOPEDIC SURGERY | Age: 64
End: 2024-08-22
Payer: COMMERCIAL

## 2024-08-22 VITALS — BODY MASS INDEX: 25.33 KG/M2 | HEIGHT: 64 IN | WEIGHT: 148.4 LBS

## 2024-08-22 DIAGNOSIS — M54.2 NECK PAIN: Primary | ICD-10-CM

## 2024-08-22 DIAGNOSIS — M47.812 CERVICAL SPONDYLOSIS: ICD-10-CM

## 2024-08-22 PROCEDURE — 99202 OFFICE O/P NEW SF 15 MIN: CPT | Performed by: PHYSICIAN ASSISTANT

## 2024-08-22 SDOH — HEALTH STABILITY: PHYSICAL HEALTH: ON AVERAGE, HOW MANY DAYS PER WEEK DO YOU ENGAGE IN MODERATE TO STRENUOUS EXERCISE (LIKE A BRISK WALK)?: 3 DAYS

## 2024-08-22 SDOH — HEALTH STABILITY: PHYSICAL HEALTH: ON AVERAGE, HOW MANY MINUTES DO YOU ENGAGE IN EXERCISE AT THIS LEVEL?: 30 MIN

## 2024-08-24 NOTE — PROGRESS NOTES
New Patient: CERVICAL SPINE    Referring Provider:  Fareed Shah MD    CHIEF COMPLAINT:    Chief Complaint   Patient presents with    Neck Pain     Neck pain for  3 months       HISTORY OF PRESENT ILLNESS:   Ms. Suha Darby is a pleasant 63 y.o. old female here for consultation regarding her neck pain. She states the pain began gradually about 2-3 months ago. Her pain has steadily worsened since onset.  She was off work for a short period of time for a vacation and her symptoms have gradually improved over the past 1 to 2 weeks.  She rates her neck pain 4/10 VAS.  She denies cervical radicular pain. She describes the pain as ache and stiffness.  Pain is worst with turning her head to the left or right. She denies numbness and tingling in the right and left upper extremity. She denies weakness of her right and left arm. Patient denies difficulty with fine motor skills. She denies lower extremity symptoms, gait abnormality, saddle numbness and bowel or bladder dysfunction. The pain does on occasion interfere with her sleep.    Current/Past Treatment:   Physical Therapy: no  Chiropractic:  no  Injection:  no   Medications: no      Past Medical History:   Past Medical History:   Diagnosis Date    Atrial fibrillation, controlled (HCC) 3/27/2013    Essential hypertension 11/1/2011    Melanoma (HCC)     Mixed hyperlipidemia 11/1/2011    Psoriasis     Type II or unspecified type diabetes mellitus without mention of complication, not stated as uncontrolled         Past Surgical History:     Past Surgical History:   Procedure Laterality Date    BREAST REDUCTION SURGERY  2017    CARDIOVERSION N/A 03/07/2022    COLONOSCOPY N/A 10/23/2020    COLONOSCOPY WITH BIOPSY performed by Dragan Cosby MD at [facility] ENDOSCOPY    JOINT REPLACEMENT Right 12/28/2020    LUMBAR DISC SURGERY      ROTATOR CUFF REPAIR Right December 2014    Dr. Rocky Mcbride at [facility]       Current Medications:

## 2024-09-12 DIAGNOSIS — E78.2 MIXED HYPERLIPIDEMIA: ICD-10-CM

## 2024-09-12 RX ORDER — ATORVASTATIN CALCIUM 40 MG/1
TABLET, FILM COATED ORAL
Qty: 90 TABLET | Refills: 1 | Status: SHIPPED | OUTPATIENT
Start: 2024-09-12

## 2024-09-16 ENCOUNTER — OFFICE VISIT (OUTPATIENT)
Dept: INTERNAL MEDICINE CLINIC | Age: 64
End: 2024-09-16
Payer: COMMERCIAL

## 2024-09-16 VITALS
OXYGEN SATURATION: 97 % | HEART RATE: 63 BPM | DIASTOLIC BLOOD PRESSURE: 70 MMHG | WEIGHT: 150 LBS | HEIGHT: 64 IN | SYSTOLIC BLOOD PRESSURE: 124 MMHG | BODY MASS INDEX: 25.61 KG/M2

## 2024-09-16 DIAGNOSIS — J06.9 UPPER RESPIRATORY TRACT INFECTION, UNSPECIFIED TYPE: Primary | ICD-10-CM

## 2024-09-16 PROCEDURE — 99213 OFFICE O/P EST LOW 20 MIN: CPT | Performed by: NURSE PRACTITIONER

## 2024-09-16 PROCEDURE — 3074F SYST BP LT 130 MM HG: CPT | Performed by: NURSE PRACTITIONER

## 2024-09-16 PROCEDURE — 3078F DIAST BP <80 MM HG: CPT | Performed by: NURSE PRACTITIONER

## 2024-09-16 RX ORDER — FLUTICASONE PROPIONATE 50 MCG
1 SPRAY, SUSPENSION (ML) NASAL DAILY
Qty: 16 G | Refills: 0 | Status: SHIPPED | OUTPATIENT
Start: 2024-09-16

## 2024-09-16 ASSESSMENT — ENCOUNTER SYMPTOMS
VOMITING: 0
NAUSEA: 0
SHORTNESS OF BREATH: 0
SINUS PAIN: 1
DIARRHEA: 0
SORE THROAT: 0
COUGH: 1
SINUS PRESSURE: 1
RHINORRHEA: 1

## 2024-09-18 RX ORDER — GLYBURIDE-METFORMIN HYDROCHLORIDE 5; 500 MG/1; MG/1
1 TABLET ORAL 2 TIMES DAILY
Qty: 180 TABLET | Refills: 3 | Status: SHIPPED | OUTPATIENT
Start: 2024-09-18

## 2024-09-18 RX ORDER — RANOLAZINE 500 MG/1
500 TABLET, EXTENDED RELEASE ORAL 2 TIMES DAILY
Qty: 180 TABLET | Refills: 0 | Status: SHIPPED | OUTPATIENT
Start: 2024-09-18

## 2024-10-08 RX ORDER — SOTALOL HYDROCHLORIDE 80 MG/1
80 TABLET ORAL 2 TIMES DAILY
Qty: 60 TABLET | Refills: 5 | Status: SHIPPED | OUTPATIENT
Start: 2024-10-08

## 2024-10-08 NOTE — TELEPHONE ENCOUNTER
Last OV: 24 Irene  Next OV: 10/25/24 Irene  Last Labs: 24  Last EK24   Last Filled:     Disp Refills Start End     sotalol (BETAPACE) 80 MG tablet 60 tablet 5 2024 --    Sig - Route: TAKE 1 TABLET BY MOUTH 2 TIMES A DAY - Oral    Sent to pharmacy as: Sotalol HCl 80 MG Oral Tablet (BETAPACE)    Cosign for Ordering: Accepted by Noah Bonilla MD on 2024  7:32 AM    E-Prescribing Status: Receipt confirmed by pharmacy (2024 10:46 AM EDT)       [FreeTextEntry1] : s/p multilevel kyphoplasty doing well\par pain improved\par biopsy confirmed pre-MM, getting treatment at [facility] [place]\par \par PT\par return in 4 weeks

## 2024-10-25 ENCOUNTER — OFFICE VISIT (OUTPATIENT)
Dept: CARDIOLOGY CLINIC | Age: 64
End: 2024-10-25
Payer: COMMERCIAL

## 2024-10-25 VITALS
HEART RATE: 64 BPM | WEIGHT: 154 LBS | BODY MASS INDEX: 26.29 KG/M2 | HEIGHT: 64 IN | DIASTOLIC BLOOD PRESSURE: 62 MMHG | SYSTOLIC BLOOD PRESSURE: 126 MMHG | OXYGEN SATURATION: 98 %

## 2024-10-25 DIAGNOSIS — I48.19 PERSISTENT ATRIAL FIBRILLATION (HCC): ICD-10-CM

## 2024-10-25 DIAGNOSIS — I48.19 PERSISTENT ATRIAL FIBRILLATION (HCC): Primary | ICD-10-CM

## 2024-10-25 DIAGNOSIS — E11.9 TYPE 2 DIABETES MELLITUS WITHOUT COMPLICATION, WITHOUT LONG-TERM CURRENT USE OF INSULIN (HCC): Chronic | ICD-10-CM

## 2024-10-25 DIAGNOSIS — I10 ESSENTIAL HYPERTENSION: Chronic | ICD-10-CM

## 2024-10-25 LAB
ALBUMIN SERPL-MCNC: 4.2 G/DL (ref 3.4–5)
ALBUMIN/GLOB SERPL: 2.1 {RATIO} (ref 1.1–2.2)
ALP SERPL-CCNC: 77 U/L (ref 40–129)
ALT SERPL-CCNC: 30 U/L (ref 10–40)
ANION GAP SERPL CALCULATED.3IONS-SCNC: 10 MMOL/L (ref 3–16)
AST SERPL-CCNC: 18 U/L (ref 15–37)
BASOPHILS # BLD: 0 K/UL (ref 0–0.2)
BASOPHILS NFR BLD: 0.7 %
BILIRUB SERPL-MCNC: 0.3 MG/DL (ref 0–1)
BUN SERPL-MCNC: 24 MG/DL (ref 7–20)
CALCIUM SERPL-MCNC: 9.5 MG/DL (ref 8.3–10.6)
CHLORIDE SERPL-SCNC: 104 MMOL/L (ref 99–110)
CO2 SERPL-SCNC: 23 MMOL/L (ref 21–32)
CREAT SERPL-MCNC: 1.1 MG/DL (ref 0.6–1.2)
DEPRECATED RDW RBC AUTO: 12.7 % (ref 12.4–15.4)
EOSINOPHIL # BLD: 0.1 K/UL (ref 0–0.6)
EOSINOPHIL NFR BLD: 1.3 %
GFR SERPLBLD CREATININE-BSD FMLA CKD-EPI: 56 ML/MIN/{1.73_M2}
GLUCOSE SERPL-MCNC: 106 MG/DL (ref 70–99)
HCT VFR BLD AUTO: 30.5 % (ref 36–48)
HGB BLD-MCNC: 10.5 G/DL (ref 12–16)
LYMPHOCYTES # BLD: 2 K/UL (ref 1–5.1)
LYMPHOCYTES NFR BLD: 30.9 %
MCH RBC QN AUTO: 34.3 PG (ref 26–34)
MCHC RBC AUTO-ENTMCNC: 34.5 G/DL (ref 31–36)
MCV RBC AUTO: 99.4 FL (ref 80–100)
MONOCYTES # BLD: 0.6 K/UL (ref 0–1.3)
MONOCYTES NFR BLD: 8.6 %
NEUTROPHILS # BLD: 3.9 K/UL (ref 1.7–7.7)
NEUTROPHILS NFR BLD: 58.5 %
PLATELET # BLD AUTO: 271 K/UL (ref 135–450)
PMV BLD AUTO: 9 FL (ref 5–10.5)
POTASSIUM SERPL-SCNC: 4.1 MMOL/L (ref 3.5–5.1)
PROT SERPL-MCNC: 6.2 G/DL (ref 6.4–8.2)
RBC # BLD AUTO: 3.07 M/UL (ref 4–5.2)
SODIUM SERPL-SCNC: 137 MMOL/L (ref 136–145)
TSH SERPL DL<=0.005 MIU/L-ACNC: 0.47 UIU/ML (ref 0.27–4.2)
WBC # BLD AUTO: 6.6 K/UL (ref 4–11)

## 2024-10-25 PROCEDURE — 93000 ELECTROCARDIOGRAM COMPLETE: CPT | Performed by: INTERNAL MEDICINE

## 2024-10-25 PROCEDURE — 3078F DIAST BP <80 MM HG: CPT | Performed by: INTERNAL MEDICINE

## 2024-10-25 PROCEDURE — 99214 OFFICE O/P EST MOD 30 MIN: CPT | Performed by: INTERNAL MEDICINE

## 2024-10-25 PROCEDURE — 36415 COLL VENOUS BLD VENIPUNCTURE: CPT | Performed by: STUDENT IN AN ORGANIZED HEALTH CARE EDUCATION/TRAINING PROGRAM

## 2024-10-25 PROCEDURE — 3074F SYST BP LT 130 MM HG: CPT | Performed by: INTERNAL MEDICINE

## 2024-10-25 NOTE — PATIENT INSTRUCTIONS
Call our office in 6 months and we will schedule you to come into office to get an EKG.  You will need to get lab work to check your Renal function every 6 months.

## 2024-10-25 NOTE — PROGRESS NOTES
Cardiac Electrophysiology Consultation   Date: 10/25/2024   Reason for Consultation:  atrial fibrillation/typical and atypical atrial flutter  Consult Requesting Physician: Fareed Shah MD     Chief Complaint:   Chief Complaint   Patient presents with    Follow-up     PT reports no new cardiac concerns at this time.      HPI: Suha Darby is a 64 y.o. patient with a history of atrial fibrillation, type II diabetes mellitus, mixed hyperlipidemia and essential hypertension    On 10/21/2021 she underwent atrial fibrillation (PVI, CAFE), left atrial flutter (roofl line, mitral isthmus) and right atrial flutter (CTI) ablation with Dr. Lutz. She was started on flecainide 50mg BID and metoprolol was decreased to 25mg BID. Flecainide was changed to Rythmol due to side effects  She was seen in office on 1/24/2022 by Irene Esposito CNP for her 3 month post ablation follow up and EKG showed atrial fibrillation rate controlled at 96 bpm.    On 1/28/2022 she underwent successful external DC cardioversion of symptomatic, persistent left atrial flutter.    She was seen in office on 02/28/2022 by Dr. Lutz and EKG showed atrial fibrillation, rate  controlled at 91 bpm. Repeat ablation and cardioversion was discussed and she decided to proceed.    On 03/11/2022 she underwent successful external DC cardioversion of symptomatic, persistent atrial flutter.  She was scheduled for ablation but chose to cancel the procedure.    She was seen in office on 12/02/2023 by Abimael Zaragoza MD and EKG showed atrial flutter-fibrillation v-rate 89 bpm.    On 02/24/2023 she underwent successful external DC cardioversion of atrial fibrillation.     She was seen in office on 08/22/2023 by Abimael Zaragoza MD and EKG showed atrial flutter-fibrillation, v-rate 78 bpm.    She sought a second opinion and saw me. We discussed medical vs ablation as treatment options for atypical flutter and atrial fibrillation. She opted to proceed with repeat 
ablation 10/5/23. Rythmol switched to Amiodarone following the procedure. Noted to have pericarditis following procedure successfully treated with colchicine.     Atypical flutter/fib recurred S/p successful cardioversion of atrial flutter 10/30/23    Seen in office for follow up 12/4/23 with PRABHAKAR Bonilla . Had complaints of increased fatigue, palpitations, and dyspnea. Also had complaints of chest discomfort with deep breathing. ECG noted atrial flutter with rate of 116 bpm. Started on Ranexa 500 mg BID and discussed Tikosyn loading with AVN ablation and PPM. Patient agreed to started Ranexa but opted to hold off on Tikosyn loading and AVN ablation with PPM.     She was seen in office 1/18/2024 for evaluation of recurrent atrial flutter. She has had multiple cardioversions since the ablation in 2021. EKG today showed 2:1 AT vs atypical flutter. Further treatment options were discussed and plan to stop amiodarone and in 4 weeks, with amiodarone washed out, we will start sotalol 80 mg bid with daily ECG and on the third day possible DCCV if still in atrial fib/flutter.    She underwent successful external DC cardioversion of persistent Atrial flutter on 3/01/2024 on sotalol.    Interval History: Today, she present to the office for follow up.     Past Medical History:   Diagnosis Date    Atrial fibrillation, controlled (HCC) 3/27/2013    Essential hypertension 11/1/2011    Melanoma (HCC)     Mixed hyperlipidemia 11/1/2011    Psoriasis     Type II or unspecified type diabetes mellitus without mention of complication, not stated as uncontrolled       Past Surgical History:   Procedure Laterality Date    BREAST REDUCTION SURGERY  2017    CARDIOVERSION N/A 03/07/2022    COLONOSCOPY N/A 10/23/2020    COLONOSCOPY WITH BIOPSY performed by Dragan Cosby MD at [facility] ENDOSCOPY    JOINT REPLACEMENT Right 12/28/2020    LUMBAR DISC SURGERY      ROTATOR CUFF REPAIR Right December 2014    Dr. Rocky Mcbride at [facility]

## 2024-10-26 LAB
EST. AVERAGE GLUCOSE BLD GHB EST-MCNC: 116.9 MG/DL
HBA1C MFR BLD: 5.7 %

## 2024-11-01 ENCOUNTER — OFFICE VISIT (OUTPATIENT)
Dept: INTERNAL MEDICINE CLINIC | Age: 64
End: 2024-11-01
Payer: COMMERCIAL

## 2024-11-01 VITALS
DIASTOLIC BLOOD PRESSURE: 70 MMHG | HEART RATE: 55 BPM | WEIGHT: 153 LBS | BODY MASS INDEX: 26.26 KG/M2 | SYSTOLIC BLOOD PRESSURE: 120 MMHG | OXYGEN SATURATION: 98 %

## 2024-11-01 DIAGNOSIS — I48.19 PERSISTENT ATRIAL FIBRILLATION (HCC): ICD-10-CM

## 2024-11-01 DIAGNOSIS — E11.9 TYPE 2 DIABETES MELLITUS WITHOUT COMPLICATION, WITHOUT LONG-TERM CURRENT USE OF INSULIN (HCC): Chronic | ICD-10-CM

## 2024-11-01 DIAGNOSIS — I48.4 ATYPICAL ATRIAL FLUTTER (HCC): ICD-10-CM

## 2024-11-01 DIAGNOSIS — I10 ESSENTIAL HYPERTENSION: Primary | Chronic | ICD-10-CM

## 2024-11-01 PROCEDURE — 99213 OFFICE O/P EST LOW 20 MIN: CPT | Performed by: STUDENT IN AN ORGANIZED HEALTH CARE EDUCATION/TRAINING PROGRAM

## 2024-11-01 PROCEDURE — 3078F DIAST BP <80 MM HG: CPT | Performed by: STUDENT IN AN ORGANIZED HEALTH CARE EDUCATION/TRAINING PROGRAM

## 2024-11-01 PROCEDURE — 3074F SYST BP LT 130 MM HG: CPT | Performed by: STUDENT IN AN ORGANIZED HEALTH CARE EDUCATION/TRAINING PROGRAM

## 2024-11-01 PROCEDURE — 3044F HG A1C LEVEL LT 7.0%: CPT | Performed by: STUDENT IN AN ORGANIZED HEALTH CARE EDUCATION/TRAINING PROGRAM

## 2024-11-01 RX ORDER — GLYBURIDE-METFORMIN HYDROCHLORIDE 5; 500 MG/1; MG/1
1 TABLET ORAL
Qty: 90 TABLET | Refills: 3
Start: 2024-11-01

## 2024-11-01 NOTE — PROGRESS NOTES
Suha Darby (:  1960) is a 64 y.o. female,Established patient, here for evaluation of the following chief complaint(s):  3 Month Follow-Up (Want to talk more about her b/s meds she had blood work done wants to go over that )         Assessment & Plan  Essential hypertension    Blood pressure is well-controlled  -Continue valsartan hydrochlorothiazide       Persistent atrial fibrillation (HCC)  -Continue sotalol p.o. twice daily managed by EP  -Continue rivaroxaban 20 mg p.o. daily    Type 2 diabetes mellitus without complication, without long-term current use of insulin (HCC)            Atypical atrial flutter (HCC)              No follow-ups on file.       Subjective   Suha Darby is a 64-year-old female with past medical history significant of type 2 diabetes, hypertension, and atrial fibrillation who presents for routine follow-up.  Overall she has been feeling well.          Review of Systems   Constitutional:  Negative for chills, fatigue and fever.   HENT:  Negative for congestion, ear pain and sore throat.    Respiratory:  Negative for cough and chest tightness.    Cardiovascular:  Negative for chest pain, palpitations and leg swelling.   Gastrointestinal:  Negative for abdominal pain, constipation, diarrhea, nausea and vomiting.   Genitourinary:  Negative for dysuria, flank pain and urgency.   Musculoskeletal:  Negative for arthralgias, back pain and joint swelling.   Skin:  Negative for rash.   Neurological:  Negative for dizziness, weakness and numbness.   Psychiatric/Behavioral:  Negative for sleep disturbance. The patient is not nervous/anxious.           Objective   Physical Exam  Constitutional:       General: She is not in acute distress.  HENT:      Mouth/Throat:      Mouth: Mucous membranes are moist.   Eyes:      General: No scleral icterus.     Pupils: Pupils are equal, round, and reactive to light.   Cardiovascular:      Rate and Rhythm: Normal rate and regular rhythm.

## 2024-11-12 ASSESSMENT — ENCOUNTER SYMPTOMS
CONSTIPATION: 0
BACK PAIN: 0
SORE THROAT: 0
VOMITING: 0
CHEST TIGHTNESS: 0
DIARRHEA: 0
COUGH: 0
NAUSEA: 0
ABDOMINAL PAIN: 0

## 2024-12-30 ENCOUNTER — OFFICE VISIT (OUTPATIENT)
Dept: INTERNAL MEDICINE CLINIC | Age: 64
End: 2024-12-30
Payer: COMMERCIAL

## 2024-12-30 VITALS
SYSTOLIC BLOOD PRESSURE: 124 MMHG | WEIGHT: 155 LBS | BODY MASS INDEX: 26.61 KG/M2 | HEART RATE: 78 BPM | DIASTOLIC BLOOD PRESSURE: 68 MMHG | OXYGEN SATURATION: 98 %

## 2024-12-30 DIAGNOSIS — R05.1 ACUTE COUGH: Primary | ICD-10-CM

## 2024-12-30 PROCEDURE — 3074F SYST BP LT 130 MM HG: CPT | Performed by: STUDENT IN AN ORGANIZED HEALTH CARE EDUCATION/TRAINING PROGRAM

## 2024-12-30 PROCEDURE — 99213 OFFICE O/P EST LOW 20 MIN: CPT | Performed by: STUDENT IN AN ORGANIZED HEALTH CARE EDUCATION/TRAINING PROGRAM

## 2024-12-30 PROCEDURE — 3078F DIAST BP <80 MM HG: CPT | Performed by: STUDENT IN AN ORGANIZED HEALTH CARE EDUCATION/TRAINING PROGRAM

## 2024-12-30 RX ORDER — PREDNISONE 20 MG/1
40 TABLET ORAL DAILY
Qty: 10 TABLET | Refills: 0 | Status: SHIPPED | OUTPATIENT
Start: 2024-12-30 | End: 2024-12-30

## 2024-12-30 RX ORDER — RANOLAZINE 500 MG/1
500 TABLET, EXTENDED RELEASE ORAL 2 TIMES DAILY
Qty: 180 TABLET | Refills: 3 | Status: SHIPPED | OUTPATIENT
Start: 2024-12-30

## 2024-12-30 RX ORDER — AZITHROMYCIN 250 MG/1
TABLET, FILM COATED ORAL
Qty: 6 TABLET | Refills: 0 | Status: SHIPPED | OUTPATIENT
Start: 2024-12-30 | End: 2025-01-09

## 2024-12-30 RX ORDER — AZITHROMYCIN 250 MG/1
TABLET, FILM COATED ORAL
Qty: 6 TABLET | Refills: 0 | Status: SHIPPED | OUTPATIENT
Start: 2024-12-30 | End: 2024-12-30

## 2024-12-30 RX ORDER — PREDNISONE 20 MG/1
40 TABLET ORAL DAILY
Qty: 10 TABLET | Refills: 0 | Status: SHIPPED | OUTPATIENT
Start: 2024-12-30 | End: 2025-01-04

## 2024-12-30 NOTE — PROGRESS NOTES
Suha Darby is a 64 y.o. female with a past medical history noted below who presents for routine follow up on chronic conditions and discussion of preventative health care.  Chief Complaint   Patient presents with    Chest Congestion     Concerns of a cough, nausea, vomiting due to the cough, sob, and congestion started 3 weeks ago was tx with antibiotic went away for a little bit and now its  back.          Past Medical History:   Diagnosis Date    Atrial fibrillation, controlled ([facility]) 3/27/2013    Essential hypertension 11/1/2011    Melanoma ([facility])     Mixed hyperlipidemia 11/1/2011    Psoriasis     Type II or unspecified type diabetes mellitus without mention of complication, not stated as uncontrolled      Patient Active Problem List   Diagnosis    Type 2 diabetes mellitus without complication (HCC)    Mixed hyperlipidemia    Essential hypertension    Persistent atrial fibrillation (HCC)    Abnormal levels of other serum enzymes    Encounter for long-term (current) use of other medications    Genital herpes simplex    Macromastia    Psoriasis vulgaris    Pure hypercholesterolemia    Status post Mohs surgery for basal cell carcinoma    Irritable bowel syndrome with diarrhea    Left-sided chest pain    Left atrial flutter by electrocardiogram ([facility])    Right atrial flutter by electrocardiogram ([facility])    Chest pain    Atypical atrial flutter ([facility])    Cervical spondylosis       Vitals:    12/30/24 1129   BP: 124/68   Site: Left Upper Arm   Position: Sitting   Cuff Size: Medium Adult   Pulse: 78   SpO2: 98%   Weight: 70.3 kg (155 lb)     General: Alert and oriented X3. No acute distress  Eyes: PEERL. No scleral icterus noted. Normal appearing conjunctiva bilaterally  Ears: Normal TM and external canal bilaterally.  Oral cavity/Throat: No pharyngeal erythema. No oral lesions.  Cardiovascular: Heart is regular rate and rhythm. No murmurs noted. Radial pulses 2+. Posterior tibial pulses 2+. No lower extremity edema

## 2025-03-17 DIAGNOSIS — E78.2 MIXED HYPERLIPIDEMIA: ICD-10-CM

## 2025-03-17 RX ORDER — ATORVASTATIN CALCIUM 40 MG/1
TABLET, FILM COATED ORAL
Qty: 90 TABLET | Refills: 1 | Status: SHIPPED | OUTPATIENT
Start: 2025-03-17

## 2025-03-17 NOTE — TELEPHONE ENCOUNTER
Medication:   Requested Prescriptions     Pending Prescriptions Disp Refills    atorvastatin (LIPITOR) 40 MG tablet 90 tablet 1     Sig: TAKE 1 TABLET BY MOUTH DAILY       Last Filled:      Patient Phone Number: [phone] (home)     Last appt: 12/30/2024   Next appt: 5/2/2025  Future Appointments   Date Time Provider Department Center   5/2/2025 10:00 AM Fareed Shah MD [facility]   10/22/2025  1:45 PM Noah Bonilla MD [facility]

## 2025-04-03 DIAGNOSIS — I48.91 ATRIAL FIBRILLATION, UNSPECIFIED TYPE (HCC): ICD-10-CM

## 2025-04-03 RX ORDER — RIVAROXABAN 20 MG/1
20 TABLET, FILM COATED ORAL DAILY
Qty: 90 TABLET | Refills: 3 | Status: SHIPPED | OUTPATIENT
Start: 2025-04-03

## 2025-04-03 NOTE — TELEPHONE ENCOUNTER
Future Appointments   Date Time Provider Department Center   5/2/2025 10:00 AM Fareed Shah MD [facility]   10/22/2025  1:45 PM Noah Bonilla MD [facility]

## 2025-04-18 DIAGNOSIS — Z79.899 ENCOUNTER FOR MONITORING SOTALOL THERAPY: Primary | ICD-10-CM

## 2025-04-18 DIAGNOSIS — Z51.81 ENCOUNTER FOR MONITORING SOTALOL THERAPY: Primary | ICD-10-CM

## 2025-04-18 DIAGNOSIS — I48.19 PERSISTENT ATRIAL FIBRILLATION (HCC): ICD-10-CM

## 2025-04-18 NOTE — TELEPHONE ENCOUNTER
Per LOV : Brenda comes for a routine follow up. She has remained in NSR on ranexa and sotalol after the last cardioversion in March this year (2024). Qtc is 440 ms. We will get BMP. Continue current medications and OAC for thromboembolic risk reduction. Get EKG and BMP q6 months.    Please call the patient and  her for an ECG ad BMP . Unable to fill until this is completed

## 2025-04-18 NOTE — TELEPHONE ENCOUNTER
Requested Prescriptions     Pending Prescriptions Disp Refills    sotalol (BETAPACE) 80 MG tablet [Pharmacy Med Name: SOTALOL 80 MG TABLET] 60 tablet 5     Sig: TAKE 1 TABLET BY MOUTH 2 TIMES A DAY        Last OV: 10/25/2024  Next OV: 10/22/2025  Last refill:10/8/2024

## 2025-04-21 RX ORDER — SOTALOL HYDROCHLORIDE 80 MG/1
80 TABLET ORAL 2 TIMES DAILY
Qty: 60 TABLET | Refills: 0 | Status: SHIPPED | OUTPATIENT
Start: 2025-04-21 | End: 2025-04-25 | Stop reason: SDUPTHER

## 2025-04-21 NOTE — TELEPHONE ENCOUNTER
Spoke wit Brenda, she states she cannot do labs/EKG until Friday due to work. Scheduled EKG for 4/25. Brenda states she cannot do labs until Friday either. She would like to know if that is OK since she is down to her last pill today?    Brenda's callback: [phone]

## 2025-04-21 NOTE — TELEPHONE ENCOUNTER
Discussed with VIRY. Rx for 30 day supply sent. Patient needs to have ECG and labs completed Friday for further refills

## 2025-04-21 NOTE — TELEPHONE ENCOUNTER
PT cannot get labs or EKG until Friday. She only has one dose of medication left. Will it be ok to miss 3-4 doses?

## 2025-04-25 ENCOUNTER — CLINICAL SUPPORT (OUTPATIENT)
Dept: CARDIOLOGY CLINIC | Age: 65
End: 2025-04-25
Payer: COMMERCIAL

## 2025-04-25 ENCOUNTER — TELEPHONE (OUTPATIENT)
Dept: CARDIOLOGY CLINIC | Age: 65
End: 2025-04-25

## 2025-04-25 VITALS
DIASTOLIC BLOOD PRESSURE: 78 MMHG | SYSTOLIC BLOOD PRESSURE: 186 MMHG | BODY MASS INDEX: 26.98 KG/M2 | HEART RATE: 63 BPM | OXYGEN SATURATION: 97 % | WEIGHT: 157.2 LBS

## 2025-04-25 DIAGNOSIS — Z79.899 ENCOUNTER FOR MONITORING SOTALOL THERAPY: ICD-10-CM

## 2025-04-25 DIAGNOSIS — I10 HYPERTENSION, UNSPECIFIED TYPE: Primary | ICD-10-CM

## 2025-04-25 DIAGNOSIS — Z51.81 ENCOUNTER FOR MONITORING SOTALOL THERAPY: ICD-10-CM

## 2025-04-25 DIAGNOSIS — I48.19 PERSISTENT ATRIAL FIBRILLATION (HCC): ICD-10-CM

## 2025-04-25 LAB
ANION GAP SERPL CALCULATED.3IONS-SCNC: 10 MMOL/L (ref 3–16)
BUN SERPL-MCNC: 20 MG/DL (ref 7–20)
CALCIUM SERPL-MCNC: 9.5 MG/DL (ref 8.3–10.6)
CHLORIDE SERPL-SCNC: 102 MMOL/L (ref 99–110)
CO2 SERPL-SCNC: 26 MMOL/L (ref 21–32)
CREAT SERPL-MCNC: 0.9 MG/DL (ref 0.6–1.2)
GFR SERPLBLD CREATININE-BSD FMLA CKD-EPI: 71 ML/MIN/{1.73_M2}
GLUCOSE SERPL-MCNC: 162 MG/DL (ref 70–99)
POTASSIUM SERPL-SCNC: 4.5 MMOL/L (ref 3.5–5.1)
SODIUM SERPL-SCNC: 138 MMOL/L (ref 136–145)

## 2025-04-25 PROCEDURE — 93000 ELECTROCARDIOGRAM COMPLETE: CPT | Performed by: INTERNAL MEDICINE

## 2025-04-25 RX ORDER — SOTALOL HYDROCHLORIDE 80 MG/1
80 TABLET ORAL 2 TIMES DAILY
Qty: 180 TABLET | Refills: 1 | Status: SHIPPED | OUTPATIENT
Start: 2025-04-25

## 2025-04-25 NOTE — PROGRESS NOTES
Patient into office today for EKG on Sotalol.  Going to lab to get labs completed now.       Message sent to Dr. Page to review in Noah Bonilla MD absence.

## 2025-04-25 NOTE — TELEPHONE ENCOUNTER
Dr. Page can you please review patient EKG and advise in Noah Bonilla MD absence.    Patient on Sotalol routine EKG completed today for monitoring of QTC on Sotalol.      EKG today sinus bradycardia with .  Previous EKG 10/25/2024     She was started on Sotalol on 02/21/2024.

## 2025-05-02 ENCOUNTER — OFFICE VISIT (OUTPATIENT)
Dept: INTERNAL MEDICINE CLINIC | Age: 65
End: 2025-05-02
Payer: COMMERCIAL

## 2025-05-02 VITALS
HEART RATE: 57 BPM | DIASTOLIC BLOOD PRESSURE: 70 MMHG | SYSTOLIC BLOOD PRESSURE: 122 MMHG | OXYGEN SATURATION: 98 % | WEIGHT: 153 LBS | BODY MASS INDEX: 26.26 KG/M2

## 2025-05-02 DIAGNOSIS — I48.91 ATRIAL FIBRILLATION, UNSPECIFIED TYPE (HCC): Primary | ICD-10-CM

## 2025-05-02 DIAGNOSIS — E11.9 TYPE 2 DIABETES MELLITUS WITHOUT COMPLICATION, WITHOUT LONG-TERM CURRENT USE OF INSULIN (HCC): ICD-10-CM

## 2025-05-02 DIAGNOSIS — K21.9 GASTROESOPHAGEAL REFLUX DISEASE, UNSPECIFIED WHETHER ESOPHAGITIS PRESENT: ICD-10-CM

## 2025-05-02 LAB
CHOLEST SERPL-MCNC: 252 MG/DL (ref 0–199)
CREAT UR-MCNC: 145 MG/DL (ref 28–259)
EST. AVERAGE GLUCOSE BLD GHB EST-MCNC: 154.2 MG/DL
HBA1C MFR BLD: 7 %
HDLC SERPL-MCNC: 90 MG/DL (ref 40–60)
LDLC SERPL CALC-MCNC: 136 MG/DL
MICROALBUMIN UR DL<=1MG/L-MCNC: 2.43 MG/DL
MICROALBUMIN/CREAT UR: 16.8 MG/G (ref 0–30)
TRIGL SERPL-MCNC: 131 MG/DL (ref 0–150)
VLDLC SERPL CALC-MCNC: 26 MG/DL

## 2025-05-02 PROCEDURE — 3078F DIAST BP <80 MM HG: CPT | Performed by: STUDENT IN AN ORGANIZED HEALTH CARE EDUCATION/TRAINING PROGRAM

## 2025-05-02 PROCEDURE — 99213 OFFICE O/P EST LOW 20 MIN: CPT | Performed by: STUDENT IN AN ORGANIZED HEALTH CARE EDUCATION/TRAINING PROGRAM

## 2025-05-02 PROCEDURE — 36415 COLL VENOUS BLD VENIPUNCTURE: CPT | Performed by: STUDENT IN AN ORGANIZED HEALTH CARE EDUCATION/TRAINING PROGRAM

## 2025-05-02 PROCEDURE — 3051F HG A1C>EQUAL 7.0%<8.0%: CPT | Performed by: STUDENT IN AN ORGANIZED HEALTH CARE EDUCATION/TRAINING PROGRAM

## 2025-05-02 PROCEDURE — 3074F SYST BP LT 130 MM HG: CPT | Performed by: STUDENT IN AN ORGANIZED HEALTH CARE EDUCATION/TRAINING PROGRAM

## 2025-05-02 SDOH — ECONOMIC STABILITY: FOOD INSECURITY: WITHIN THE PAST 12 MONTHS, YOU WORRIED THAT YOUR FOOD WOULD RUN OUT BEFORE YOU GOT MONEY TO BUY MORE.: NEVER TRUE

## 2025-05-02 SDOH — ECONOMIC STABILITY: FOOD INSECURITY: WITHIN THE PAST 12 MONTHS, THE FOOD YOU BOUGHT JUST DIDN'T LAST AND YOU DIDN'T HAVE MONEY TO GET MORE.: NEVER TRUE

## 2025-05-02 ASSESSMENT — PATIENT HEALTH QUESTIONNAIRE - PHQ9
SUM OF ALL RESPONSES TO PHQ QUESTIONS 1-9: 0
2. FEELING DOWN, DEPRESSED OR HOPELESS: NOT AT ALL
SUM OF ALL RESPONSES TO PHQ QUESTIONS 1-9: 0
1. LITTLE INTEREST OR PLEASURE IN DOING THINGS: NOT AT ALL
SUM OF ALL RESPONSES TO PHQ QUESTIONS 1-9: 0
SUM OF ALL RESPONSES TO PHQ QUESTIONS 1-9: 0

## 2025-05-02 NOTE — ASSESSMENT & PLAN NOTE
T2DM: Patient with history of type 2  diabetes.Patient's last a1c was   Hemoglobin A1C   Date Value Ref Range Status   05/02/2025 7.0 See comment % Final     Comment:     Comment:  Diagnosis of Diabetes: > or = 6.5%  Increased risk of diabetes (Prediabetes): 5.7-6.4%  Glycemic Control: Nonpregnant Adults: <7.0%                    Pregnant: <6.0%       . Patient is compliant with   Key Antihyperglycemic Medications              glyBURIDE-metFORMIN (GLUCOVANCE) 5-500 MG per tablet (Taking) Take 1 tablet by mouth daily (with breakfast)        . Patient does not monitor blood sugars at home. Patient does not have neuropathy, nephropathy, or history of vascular disorder.      Orders:    LIPID PANEL    Hemoglobin A1C    Albumin/Creatinine Ratio, Urine

## 2025-05-02 NOTE — PROGRESS NOTES
Suha Darby is a 64 y.o. female with a past medical history noted below who presents for routine follow up on chronic conditions and discussion of preventative health care.  Chief Complaint   Patient presents with    6 Month Follow-Up     Pt want to talk about her b/p her cardiologist suggest she goes up  on her b/p meds but its only high the doctor office and not at home           Past Medical History:   Diagnosis Date    Atrial fibrillation, controlled ([facility]) 3/27/2013    Essential hypertension 11/1/2011    Melanoma (HCC)     Mixed hyperlipidemia 11/1/2011    Psoriasis     Type II or unspecified type diabetes mellitus without mention of complication, not stated as uncontrolled      Patient Active Problem List   Diagnosis    Type 2 diabetes mellitus without complication (HCC)    Mixed hyperlipidemia    Essential hypertension    Persistent atrial fibrillation (HCC)    Abnormal levels of other serum enzymes    Encounter for long-term (current) use of other medications    Genital herpes simplex    Macromastia    Psoriasis vulgaris    Pure hypercholesterolemia    Status post Mohs surgery for basal cell carcinoma    Irritable bowel syndrome with diarrhea    Left-sided chest pain    Left atrial flutter by electrocardiogram ([facility])    Right atrial flutter by electrocardiogram ([facility])    Chest pain    Atypical atrial flutter ([facility])    Cervical spondylosis       Vitals:    05/02/25 1011   BP: 122/70   Pulse: 57   SpO2: 98%   Weight: 69.4 kg (153 lb)     General: Alert and oriented X3. No acute distress  Eyes: PEERL. No scleral icterus noted. Normal appearing conjunctiva bilaterally  Ears: Normal TM and external canal bilaterally.  Oral cavity/Throat: No pharyngeal erythema. No oral lesions.  Cardiovascular: Heart is regular rate and rhythm. No murmurs noted. Radial pulses 2+. Posterior tibial pulses 2+. No lower extremity edema noted  Pulmonary: Lungs clear to auscultation bilaterally  Skin: Dry, warm. No obvious skin

## 2025-05-05 ENCOUNTER — RESULTS FOLLOW-UP (OUTPATIENT)
Dept: INTERNAL MEDICINE CLINIC | Age: 65
End: 2025-05-05

## (undated) DEVICE — FORCEPS BX 240CM 2.4MM L NDL RAD JAW 4 M00513334